# Patient Record
Sex: FEMALE | URBAN - NONMETROPOLITAN AREA
[De-identification: names, ages, dates, MRNs, and addresses within clinical notes are randomized per-mention and may not be internally consistent; named-entity substitution may affect disease eponyms.]

---

## 2019-07-15 ENCOUNTER — OFFICE VISIT (OUTPATIENT)
Dept: PRIMARY CARE CLINIC | Age: 44
End: 2019-07-15
Payer: COMMERCIAL

## 2019-07-15 VITALS
OXYGEN SATURATION: 97 % | HEART RATE: 88 BPM | SYSTOLIC BLOOD PRESSURE: 126 MMHG | RESPIRATION RATE: 12 BRPM | HEIGHT: 68 IN | DIASTOLIC BLOOD PRESSURE: 86 MMHG

## 2019-07-15 DIAGNOSIS — F41.9 ANXIETY: Primary | ICD-10-CM

## 2019-07-15 DIAGNOSIS — R10.9 ABDOMINAL PAIN, UNSPECIFIED ABDOMINAL LOCATION: ICD-10-CM

## 2019-07-15 DIAGNOSIS — R21 RASH: ICD-10-CM

## 2019-07-15 DIAGNOSIS — K21.9 GASTROESOPHAGEAL REFLUX DISEASE, ESOPHAGITIS PRESENCE NOT SPECIFIED: ICD-10-CM

## 2019-07-15 DIAGNOSIS — G47.00 INSOMNIA, UNSPECIFIED TYPE: ICD-10-CM

## 2019-07-15 PROCEDURE — 99214 OFFICE O/P EST MOD 30 MIN: CPT | Performed by: FAMILY MEDICINE

## 2019-07-15 RX ORDER — ZOLPIDEM TARTRATE 10 MG/1
10 TABLET ORAL DAILY
Qty: 30 TABLET | Refills: 0 | Status: SHIPPED | OUTPATIENT
Start: 2019-07-15 | End: 2019-08-14

## 2019-07-15 RX ORDER — FUROSEMIDE 20 MG/1
20 TABLET ORAL DAILY
COMMUNITY
End: 2019-07-15 | Stop reason: SDUPTHER

## 2019-07-15 RX ORDER — QUETIAPINE FUMARATE 200 MG/1
100 TABLET, FILM COATED ORAL DAILY
COMMUNITY
End: 2019-07-15 | Stop reason: SDUPTHER

## 2019-07-15 RX ORDER — TOPIRAMATE 25 MG/1
25 TABLET ORAL DAILY
COMMUNITY
End: 2019-07-15 | Stop reason: SDUPTHER

## 2019-07-15 RX ORDER — SUCRALFATE 1 G/1
1 TABLET ORAL 3 TIMES DAILY
Refills: 5 | COMMUNITY
Start: 2019-06-28 | End: 2019-07-15 | Stop reason: SDUPTHER

## 2019-07-15 RX ORDER — TOPIRAMATE 25 MG/1
25 TABLET ORAL DAILY
Qty: 30 TABLET | Refills: 3 | Status: SHIPPED
Start: 2019-07-15 | End: 2020-09-02 | Stop reason: SDUPTHER

## 2019-07-15 RX ORDER — ALPRAZOLAM 2 MG/1
2 TABLET ORAL 3 TIMES DAILY
COMMUNITY
End: 2019-07-15 | Stop reason: SDUPTHER

## 2019-07-15 RX ORDER — SUCRALFATE 1 G/1
1 TABLET ORAL 3 TIMES DAILY
Qty: 120 TABLET | Refills: 5 | Status: SHIPPED
Start: 2019-07-15 | End: 2020-09-02 | Stop reason: SDUPTHER

## 2019-07-15 RX ORDER — KETOTIFEN FUMARATE 0.35 MG/ML
1 SOLUTION/ DROPS OPHTHALMIC 2 TIMES DAILY
COMMUNITY
End: 2019-07-15 | Stop reason: SDUPTHER

## 2019-07-15 RX ORDER — ALPRAZOLAM 2 MG/1
2 TABLET ORAL 3 TIMES DAILY
Qty: 90 TABLET | Refills: 0 | Status: SHIPPED | OUTPATIENT
Start: 2019-07-15 | End: 2019-08-14

## 2019-07-15 RX ORDER — DEXLANSOPRAZOLE 60 MG/1
60 CAPSULE, DELAYED RELEASE ORAL DAILY
Qty: 30 CAPSULE | Refills: 5 | Status: SHIPPED
Start: 2019-07-15 | End: 2020-09-02 | Stop reason: SDUPTHER

## 2019-07-15 RX ORDER — DEXLANSOPRAZOLE 60 MG/1
60 CAPSULE, DELAYED RELEASE ORAL DAILY
Refills: 5 | COMMUNITY
Start: 2019-04-16 | End: 2019-07-15 | Stop reason: SDUPTHER

## 2019-07-15 RX ORDER — KETOTIFEN FUMARATE 0.35 MG/ML
1 SOLUTION/ DROPS OPHTHALMIC 2 TIMES DAILY
Qty: 1 BOTTLE | Refills: 3 | Status: SHIPPED
Start: 2019-07-15 | End: 2020-09-02 | Stop reason: SDUPTHER

## 2019-07-15 RX ORDER — ZOLPIDEM TARTRATE 10 MG/1
10 TABLET ORAL DAILY
COMMUNITY
Start: 2009-04-17 | End: 2019-07-15 | Stop reason: SDUPTHER

## 2019-07-15 RX ORDER — QUETIAPINE FUMARATE 200 MG/1
200 TABLET, FILM COATED ORAL DAILY
Qty: 30 TABLET | Refills: 3 | Status: SHIPPED
Start: 2019-07-15 | End: 2020-09-02 | Stop reason: SDUPTHER

## 2019-07-15 RX ORDER — TRIAMCINOLONE ACETONIDE 1 MG/G
CREAM TOPICAL
Qty: 1 TUBE | Refills: 2 | Status: SHIPPED | OUTPATIENT
Start: 2019-07-15

## 2019-07-15 RX ORDER — FUROSEMIDE 20 MG/1
20 TABLET ORAL DAILY
Qty: 30 TABLET | Refills: 3 | Status: SHIPPED
Start: 2019-07-15 | End: 2020-09-02 | Stop reason: SDUPTHER

## 2019-07-15 ASSESSMENT — PATIENT HEALTH QUESTIONNAIRE - PHQ9
1. LITTLE INTEREST OR PLEASURE IN DOING THINGS: 0
SUM OF ALL RESPONSES TO PHQ QUESTIONS 1-9: 2
SUM OF ALL RESPONSES TO PHQ QUESTIONS 1-9: 2
2. FEELING DOWN, DEPRESSED OR HOPELESS: 2
SUM OF ALL RESPONSES TO PHQ9 QUESTIONS 1 & 2: 2

## 2019-07-15 NOTE — PROGRESS NOTES
tablet by mouth 3 times daily, Disp: 120 tablet, Rfl: 5    QUEtiapine (SEROQUEL) 200 MG tablet, Take 1 tablet by mouth daily, Disp: 30 tablet, Rfl: 3    ketotifen (ALAWAY) 0.025 % ophthalmic solution, Place 1 drop into both eyes 2 times daily, Disp: 1 Bottle, Rfl: 3    furosemide (LASIX) 20 MG tablet, Take 1 tablet by mouth daily, Disp: 30 tablet, Rfl: 3    DEXILANT 60 MG CPDR delayed release capsule, Take 1 capsule by mouth daily, Disp: 30 capsule, Rfl: 5    ALPRAZolam (XANAX) 2 MG tablet, Take 1 tablet by mouth 3 times daily for 30 days. , Disp: 90 tablet, Rfl: 0    zolpidem (AMBIEN) 10 MG tablet, Take 1 tablet by mouth daily for 30 days. , Disp: 30 tablet, Rfl: 0    triamcinolone (KENALOG) 0.1 % cream, Apply topically 2 times daily. , Disp: 1 Tube, Rfl: 2    Allergies   Allergen Reactions    Latex Itching and Rash    Morphine Hives    Penicillins Hives       No past medical history on file.   Social History     Socioeconomic History    Marital status: Single     Spouse name: Not on file    Number of children: Not on file    Years of education: Not on file    Highest education level: Not on file   Occupational History    Not on file   Social Needs    Financial resource strain: Not on file    Food insecurity:     Worry: Not on file     Inability: Not on file    Transportation needs:     Medical: Not on file     Non-medical: Not on file   Tobacco Use    Smoking status: Never Smoker    Smokeless tobacco: Never Used   Substance and Sexual Activity    Alcohol use: Not Currently    Drug use: Never    Sexual activity: Yes   Lifestyle    Physical activity:     Days per week: Not on file     Minutes per session: Not on file    Stress: Not on file   Relationships    Social connections:     Talks on phone: Not on file     Gets together: Not on file     Attends Anabaptist service: Not on file     Active member of club or organization: Not on file     Attends meetings of clubs or organizations: Not on file

## 2019-07-24 NOTE — TELEPHONE ENCOUNTER
Patient is calling in to update you on her legs. Her legs are still holding water and she is still breaking out and her legs are still sore, it is a muscle pain. She has been using the cream that she was given and it is still there. Patient states that her lower back is hurting her. She is not sure if it is just because she is sitting down all the time or if it is her kidney. She just wanted to make you aware of that. She would also like her inhaler refilled.

## 2019-08-01 RX ORDER — ALBUTEROL SULFATE 90 UG/1
2 AEROSOL, METERED RESPIRATORY (INHALATION) EVERY 6 HOURS PRN
COMMUNITY
End: 2019-08-01 | Stop reason: SDUPTHER

## 2019-08-01 RX ORDER — ALBUTEROL SULFATE 90 UG/1
2 AEROSOL, METERED RESPIRATORY (INHALATION) EVERY 6 HOURS PRN
Qty: 1 INHALER | Refills: 2 | Status: SHIPPED
Start: 2019-08-01 | End: 2020-09-02 | Stop reason: SDUPTHER

## 2019-08-16 RX ORDER — ONDANSETRON 4 MG/1
24 TABLET, FILM COATED ORAL EVERY 8 HOURS PRN
COMMUNITY
End: 2019-08-16 | Stop reason: SDUPTHER

## 2019-08-16 RX ORDER — ONDANSETRON 4 MG/1
8 TABLET, FILM COATED ORAL EVERY 8 HOURS PRN
Qty: 90 TABLET | Refills: 0 | Status: SHIPPED | OUTPATIENT
Start: 2019-08-16 | End: 2019-09-15

## 2019-09-26 ENCOUNTER — TELEPHONE (OUTPATIENT)
Dept: PRIMARY CARE CLINIC | Age: 44
End: 2019-09-26

## 2019-10-08 RX ORDER — ZOLPIDEM TARTRATE 10 MG/1
TABLET ORAL NIGHTLY PRN
COMMUNITY

## 2019-10-08 RX ORDER — GABAPENTIN 300 MG/1
300 CAPSULE ORAL 3 TIMES DAILY
COMMUNITY

## 2019-10-08 RX ORDER — FLUTICASONE PROPIONATE 50 MCG
2 SPRAY, SUSPENSION (ML) NASAL DAILY
COMMUNITY
End: 2021-03-02 | Stop reason: SDUPTHER

## 2019-10-08 RX ORDER — PANTOPRAZOLE SODIUM 40 MG/1
40 TABLET, DELAYED RELEASE ORAL DAILY
COMMUNITY
End: 2021-03-02 | Stop reason: SDUPTHER

## 2019-10-08 RX ORDER — ALPRAZOLAM 2 MG/1
2 TABLET ORAL 3 TIMES DAILY
COMMUNITY

## 2019-10-08 RX ORDER — PERMETHRIN 50 MG/G
CREAM TOPICAL ONCE
COMMUNITY
End: 2021-03-02

## 2019-10-08 RX ORDER — FLUCONAZOLE 150 MG/1
150 TABLET ORAL ONCE
COMMUNITY
End: 2021-03-03 | Stop reason: SDUPTHER

## 2020-05-19 ENCOUNTER — TELEPHONE (OUTPATIENT)
Dept: PRIMARY CARE CLINIC | Age: 45
End: 2020-05-19

## 2020-05-19 RX ORDER — LORATADINE 10 MG/1
10 TABLET ORAL DAILY
Qty: 30 TABLET | Refills: 0 | Status: SHIPPED | OUTPATIENT
Start: 2020-05-19 | End: 2020-06-18

## 2020-08-13 ENCOUNTER — TELEPHONE (OUTPATIENT)
Dept: PRIMARY CARE CLINIC | Age: 45
End: 2020-08-13

## 2020-08-13 NOTE — TELEPHONE ENCOUNTER
Last Appointment:  10/16/2019  No future appointments. Patient left message about paperwork for her wheelchair. She needs parts and [employer] has faxed over paperwork to be completed. [facility] told her they have not received paperwork back. Did not find paperwork scanned on chart.     Electronically signed by Manda Sahni LPN on 5/05/5052 at 7:44 PM

## 2020-08-25 ENCOUNTER — HOSPITAL ENCOUNTER (OUTPATIENT)
Age: 45
Discharge: HOME | End: 2020-08-27
Payer: COMMERCIAL

## 2020-08-25 ENCOUNTER — OFFICE VISIT (OUTPATIENT)
Dept: FAMILY MEDICINE CLINIC | Age: 45
End: 2020-08-25
Payer: COMMERCIAL

## 2020-08-25 VITALS
HEART RATE: 92 BPM | OXYGEN SATURATION: 97 % | SYSTOLIC BLOOD PRESSURE: 115 MMHG | TEMPERATURE: 97.8 F | DIASTOLIC BLOOD PRESSURE: 75 MMHG

## 2020-08-25 PROCEDURE — 1036F TOBACCO NON-USER: CPT | Performed by: PHYSICIAN ASSISTANT

## 2020-08-25 PROCEDURE — 99213 OFFICE O/P EST LOW 20 MIN: CPT | Performed by: PHYSICIAN ASSISTANT

## 2020-08-25 PROCEDURE — G8421 BMI NOT CALCULATED: HCPCS | Performed by: PHYSICIAN ASSISTANT

## 2020-08-25 PROCEDURE — 87186 SC STD MICRODIL/AGAR DIL: CPT

## 2020-08-25 PROCEDURE — G8427 DOCREV CUR MEDS BY ELIG CLIN: HCPCS | Performed by: PHYSICIAN ASSISTANT

## 2020-08-25 PROCEDURE — 87070 CULTURE OTHR SPECIMN AEROBIC: CPT

## 2020-08-25 RX ORDER — CEFDINIR 300 MG/1
300 CAPSULE ORAL 2 TIMES DAILY
Qty: 14 CAPSULE | Refills: 0 | Status: SHIPPED | OUTPATIENT
Start: 2020-08-25 | End: 2020-09-01

## 2020-08-25 RX ORDER — DOXYCYCLINE HYCLATE 100 MG
100 TABLET ORAL 2 TIMES DAILY
Qty: 20 TABLET | Refills: 0 | Status: SHIPPED | OUTPATIENT
Start: 2020-08-25 | End: 2020-09-04

## 2020-08-25 ASSESSMENT — ENCOUNTER SYMPTOMS
SORE THROAT: 0
VOMITING: 0
NAUSEA: 0
SHORTNESS OF BREATH: 0
COUGH: 0
PHOTOPHOBIA: 0
BACK PAIN: 0
DIARRHEA: 0
ABDOMINAL PAIN: 0

## 2020-08-25 NOTE — PROGRESS NOTES
20  [facility] : 1975 Sex: female  Age 40 y.o. Subjective:  Chief Complaint   Patient presents with    Leg Swelling         42-year-old female presents to the walk-in clinic for evaluation of a rash on her lower right leg. Patient states the rash has been there for several months. She was given a cream by her PCP to try but states it made it worse. She states at first it was itching but now it is painful. She states that she has blisters form which then drain serous fluid. She denies any new soaps, lotions, detergents or medications. Patient denies any fever or chills. No nausea or vomiting. She denies numbness or tingling to her extremities. Review of Systems   Constitutional: Negative for chills and fever. HENT: Negative for congestion, ear pain and sore throat. Eyes: Negative for photophobia and visual disturbance. Respiratory: Negative for cough and shortness of breath. Cardiovascular: Negative for chest pain. Gastrointestinal: Negative for abdominal pain, diarrhea, nausea and vomiting. Genitourinary: Negative for difficulty urinating, dysuria, frequency and urgency. Musculoskeletal: Negative for back pain, neck pain and neck stiffness. Skin: Positive for rash. Neurological: Negative for dizziness, syncope, weakness, light-headedness and headaches. Hematological: Negative for adenopathy. Does not bruise/bleed easily. Psychiatric/Behavioral: Negative for agitation and confusion. All other systems reviewed and are negative. PMH:   History reviewed. No pertinent past medical history. History reviewed. No pertinent surgical history. History reviewed. No pertinent family history. Medications:     Current Outpatient Medications:     fluconazole (DIFLUCAN) 150 MG tablet, Take 150 mg by mouth once 1 by mouth now, and may repeat in 24 hours, Disp: , Rfl:     gabapentin (NEURONTIN) 300 MG capsule, Take 300 mg by mouth 3 times daily. , Disp: , Rfl:     pantoprazole (PROTONIX) 40 MG tablet, Take 40 mg by mouth daily, Disp: , Rfl:     ALPRAZolam (XANAX) 2 MG tablet, Take 2 mg by mouth 3 times daily. 1 by mouth three times a day, Disp: , Rfl:     zolpidem (AMBIEN) 10 MG tablet, Take by mouth nightly as needed for Sleep. 1 by mouth every night at bedtime as needed; sleep, Disp: , Rfl:     permethrin (ELIMITE) 5 % cream, Apply topically once Apply topically as directed, Disp: , Rfl:     fluticasone (FLONASE) 50 MCG/ACT nasal spray, 2 sprays by Each Nostril route daily 2 sprays each nostril daily, Disp: , Rfl:     albuterol sulfate  (90 Base) MCG/ACT inhaler, Inhale 2 puffs into the lungs every 6 hours as needed for Wheezing, Disp: 1 Inhaler, Rfl: 2    topiramate (TOPAMAX) 25 MG tablet, Take 1 tablet by mouth daily, Disp: 30 tablet, Rfl: 3    sucralfate (CARAFATE) 1 GM tablet, Take 1 tablet by mouth 3 times daily, Disp: 120 tablet, Rfl: 5    ketotifen (ALAWAY) 0.025 % ophthalmic solution, Place 1 drop into both eyes 2 times daily, Disp: 1 Bottle, Rfl: 3    furosemide (LASIX) 20 MG tablet, Take 1 tablet by mouth daily, Disp: 30 tablet, Rfl: 3    DEXILANT 60 MG CPDR delayed release capsule, Take 1 capsule by mouth daily, Disp: 30 capsule, Rfl: 5    triamcinolone (KENALOG) 0.1 % cream, Apply topically 2 times daily. , Disp: 1 Tube, Rfl: 2    QUEtiapine (SEROQUEL) 200 MG tablet, Take 1 tablet by mouth daily, Disp: 30 tablet, Rfl: 3    Allergies: Allergies   Allergen Reactions    Latex Itching and Rash    Morphine Hives    Penicillins Hives       Social History:     Social History     Tobacco Use    Smoking status: Never Smoker    Smokeless tobacco: Never Used   Substance Use Topics    Alcohol use: Not Currently    Drug use: Never       Patient lives at home.     Physical Exam:     Vitals:    08/25/20 1250   BP: 115/75   Pulse: 92   Temp: 97.8 °F (36.6 °C)   TempSrc: Temporal   SpO2: 97%       Exam:  Physical Exam  Vitals signs and nursing note reviewed. Constitutional:       General: She is not in acute distress. Appearance: She is well-developed. HENT:      Head: Normocephalic and atraumatic. Eyes:      Conjunctiva/sclera: Conjunctivae normal.      Pupils: Pupils are equal, round, and reactive to light. Neck:      Musculoskeletal: Normal range of motion. Cardiovascular:      Rate and Rhythm: Normal rate and regular rhythm. Pulmonary:      Effort: Pulmonary effort is normal. No respiratory distress. Breath sounds: Normal breath sounds. Abdominal:      General: Bowel sounds are normal.      Palpations: Abdomen is soft. Tenderness: There is no abdominal tenderness. Musculoskeletal: Normal range of motion. Skin:     General: Skin is warm and dry. Comments: Patient has evidence of erythematous maculopapular rash noted to the distal anterior medial right leg. There are some blister formation which is oozing serous fluid. I collected a wound culture. The surrounding area is erythematous and warm to the touch. There is no fluctuance or induration. No vesicle formation. There is no sloughing the skin. No petechia or purpura. No calf tenderness. Neurological:      Mental Status: She is alert and oriented to person, place, and time. Psychiatric:         Behavior: Behavior normal.         Thought Content: Thought content normal.         Judgment: Judgment normal.           Testing:           Medical Decision Making:     Patient upon arrival did not appear toxic or lethargic. Vital signs were reviewed. Past medical history reviewed. Allergies reviewed. Medications reviewed. Patient is presenting with the above complaint of rash. Differential diagnosis was discussed with the patient. Wound culture is pending. There is concern for a secondary infection and the patient will be placed on Omnicef and doxycycline. Patient has an allergy to penicillin causing a rash.   Her reaction is not anaphylactic. Patient has an appointment to follow-up with her PCP on Monday of next week. She will return or go to the emergency department for worsening symptoms. Clinical Impression:   Richi Redman was seen today for leg swelling. Diagnoses and all orders for this visit:    Rash in adult  -     Culture, Wound; Future    Other orders  -     doxycycline hyclate (VIBRA-TABS) 100 MG tablet; Take 1 tablet by mouth 2 times daily for 10 days  -     cefdinir (OMNICEF) 300 MG capsule; Take 1 capsule by mouth 2 times daily for 7 days        The patient is to call for any concerns or return if any of the signs or symptoms worsen. The patient is to follow-up with PCP in the next 2-3 days for repeat evaluation repeat assessment or go directly to the emergency department. SIGNATURE: Traci Romeo PA-C

## 2020-08-28 LAB
ORGANISM: ABNORMAL
ORGANISM: ABNORMAL
WOUND/ABSCESS: ABNORMAL
WOUND/ABSCESS: ABNORMAL

## 2020-09-02 ENCOUNTER — OFFICE VISIT (OUTPATIENT)
Dept: PRIMARY CARE CLINIC | Age: 45
End: 2020-09-02
Payer: COMMERCIAL

## 2020-09-02 ENCOUNTER — TELEPHONE (OUTPATIENT)
Dept: PRIMARY CARE CLINIC | Age: 45
End: 2020-09-02

## 2020-09-02 ENCOUNTER — HOSPITAL ENCOUNTER (OUTPATIENT)
Age: 45
Discharge: HOME | End: 2020-09-04
Payer: COMMERCIAL

## 2020-09-02 VITALS — DIASTOLIC BLOOD PRESSURE: 76 MMHG | SYSTOLIC BLOOD PRESSURE: 132 MMHG | RESPIRATION RATE: 16 BRPM | HEIGHT: 68 IN

## 2020-09-02 LAB
ANION GAP SERPL CALCULATED.3IONS-SCNC: 16 MMOL/L (ref 7–16)
BUN BLDV-MCNC: 6 MG/DL (ref 6–20)
CALCIUM SERPL-MCNC: 9.4 MG/DL (ref 8.6–10.2)
CHLORIDE BLD-SCNC: 104 MMOL/L (ref 98–107)
CHOLESTEROL, TOTAL: 151 MG/DL (ref 0–199)
CO2: 22 MMOL/L (ref 22–29)
CREAT SERPL-MCNC: 0.6 MG/DL (ref 0.5–1)
GFR AFRICAN AMERICAN: >60
GFR NON-AFRICAN AMERICAN: >60 ML/MIN/1.73
GLUCOSE BLD-MCNC: 95 MG/DL (ref 74–99)
HDLC SERPL-MCNC: 43 MG/DL
LDL CHOLESTEROL CALCULATED: 78 MG/DL (ref 0–99)
POTASSIUM SERPL-SCNC: 4 MMOL/L (ref 3.5–5)
SODIUM BLD-SCNC: 142 MMOL/L (ref 132–146)
TRIGL SERPL-MCNC: 150 MG/DL (ref 0–149)
VLDLC SERPL CALC-MCNC: 30 MG/DL

## 2020-09-02 PROCEDURE — 86703 HIV-1/HIV-2 1 RESULT ANTBDY: CPT

## 2020-09-02 PROCEDURE — 99213 OFFICE O/P EST LOW 20 MIN: CPT | Performed by: FAMILY MEDICINE

## 2020-09-02 PROCEDURE — 80048 BASIC METABOLIC PNL TOTAL CA: CPT

## 2020-09-02 PROCEDURE — 99396 PREV VISIT EST AGE 40-64: CPT | Performed by: FAMILY MEDICINE

## 2020-09-02 PROCEDURE — 80061 LIPID PANEL: CPT

## 2020-09-02 PROCEDURE — 36415 COLL VENOUS BLD VENIPUNCTURE: CPT

## 2020-09-02 RX ORDER — ALBUTEROL SULFATE 90 UG/1
2 AEROSOL, METERED RESPIRATORY (INHALATION) EVERY 6 HOURS PRN
Qty: 1 INHALER | Refills: 2 | Status: SHIPPED
Start: 2020-09-02 | End: 2021-03-03 | Stop reason: SDUPTHER

## 2020-09-02 RX ORDER — METHYLPREDNISOLONE 4 MG/1
TABLET ORAL
Qty: 21 TABLET | Refills: 0 | Status: SHIPPED | OUTPATIENT
Start: 2020-09-02 | End: 2021-08-31

## 2020-09-02 RX ORDER — TOPIRAMATE 25 MG/1
25 TABLET ORAL DAILY
Qty: 30 TABLET | Refills: 3 | Status: SHIPPED
Start: 2020-09-02 | End: 2021-03-03 | Stop reason: SDUPTHER

## 2020-09-02 RX ORDER — SUCRALFATE 1 G/1
1 TABLET ORAL 3 TIMES DAILY
Qty: 120 TABLET | Refills: 5 | Status: SHIPPED
Start: 2020-09-02 | End: 2021-03-03 | Stop reason: SDUPTHER

## 2020-09-02 RX ORDER — QUETIAPINE FUMARATE 200 MG/1
200 TABLET, FILM COATED ORAL DAILY
Qty: 30 TABLET | Refills: 3 | Status: SHIPPED
Start: 2020-09-02 | End: 2021-03-03 | Stop reason: SDUPTHER

## 2020-09-02 RX ORDER — KETOTIFEN FUMARATE 0.35 MG/ML
1 SOLUTION/ DROPS OPHTHALMIC 2 TIMES DAILY
Qty: 1 BOTTLE | Refills: 3 | Status: SHIPPED
Start: 2020-09-02 | End: 2021-03-03 | Stop reason: SDUPTHER

## 2020-09-02 RX ORDER — FUROSEMIDE 20 MG/1
20 TABLET ORAL DAILY
Qty: 30 TABLET | Refills: 3 | Status: SHIPPED
Start: 2020-09-02 | End: 2021-03-03 | Stop reason: SDUPTHER

## 2020-09-02 RX ORDER — DEXLANSOPRAZOLE 60 MG/1
60 CAPSULE, DELAYED RELEASE ORAL DAILY
Qty: 30 CAPSULE | Refills: 5 | Status: SHIPPED
Start: 2020-09-02 | End: 2021-01-22 | Stop reason: SDUPTHER

## 2020-09-02 ASSESSMENT — PATIENT HEALTH QUESTIONNAIRE - PHQ9
SUM OF ALL RESPONSES TO PHQ9 QUESTIONS 1 & 2: 0
SUM OF ALL RESPONSES TO PHQ QUESTIONS 1-9: 0
SUM OF ALL RESPONSES TO PHQ QUESTIONS 1-9: 0
1. LITTLE INTEREST OR PLEASURE IN DOING THINGS: 0
2. FEELING DOWN, DEPRESSED OR HOPELESS: 0

## 2020-09-02 NOTE — PROGRESS NOTES
MCG/ACT inhaler, Inhale 2 puffs into the lungs every 6 hours as needed for Wheezing, Disp: 1 Inhaler, Rfl: 2    DEXILANT 60 MG CPDR delayed release capsule, Take 1 capsule by mouth daily, Disp: 30 capsule, Rfl: 5    furosemide (LASIX) 20 MG tablet, Take 1 tablet by mouth daily, Disp: 30 tablet, Rfl: 3    ketotifen (ALAWAY) 0.025 % ophthalmic solution, Place 1 drop into both eyes 2 times daily, Disp: 1 Bottle, Rfl: 3    QUEtiapine (SEROQUEL) 200 MG tablet, Take 1 tablet by mouth daily, Disp: 30 tablet, Rfl: 3    sucralfate (CARAFATE) 1 GM tablet, Take 1 tablet by mouth 3 times daily, Disp: 120 tablet, Rfl: 5    topiramate (TOPAMAX) 25 MG tablet, Take 1 tablet by mouth daily, Disp: 30 tablet, Rfl: 3    methylPREDNISolone (MEDROL DOSEPACK) 4 MG tablet, Take by mouth., Disp: 21 tablet, Rfl: 0    doxycycline hyclate (VIBRA-TABS) 100 MG tablet, Take 1 tablet by mouth 2 times daily for 10 days, Disp: 20 tablet, Rfl: 0    fluconazole (DIFLUCAN) 150 MG tablet, Take 150 mg by mouth once 1 by mouth now, and may repeat in 24 hours, Disp: , Rfl:     gabapentin (NEURONTIN) 300 MG capsule, Take 300 mg by mouth 3 times daily. , Disp: , Rfl:     pantoprazole (PROTONIX) 40 MG tablet, Take 40 mg by mouth daily, Disp: , Rfl:     ALPRAZolam (XANAX) 2 MG tablet, Take 2 mg by mouth 3 times daily. 1 by mouth three times a day, Disp: , Rfl:     zolpidem (AMBIEN) 10 MG tablet, Take by mouth nightly as needed for Sleep. 1 by mouth every night at bedtime as needed; sleep, Disp: , Rfl:     permethrin (ELIMITE) 5 % cream, Apply topically once Apply topically as directed, Disp: , Rfl:     fluticasone (FLONASE) 50 MCG/ACT nasal spray, 2 sprays by Each Nostril route daily 2 sprays each nostril daily, Disp: , Rfl:     triamcinolone (KENALOG) 0.1 % cream, Apply topically 2 times daily. , Disp: 1 Tube, Rfl: 2    Allergies   Allergen Reactions    Latex Itching and Rash    Morphine Hives    Penicillins Hives       History reviewed.  No pertinent past medical history. Social History     Socioeconomic History    Marital status: Single     Spouse name: Not on file    Number of children: Not on file    Years of education: Not on file    Highest education level: Not on file   Occupational History    Not on file   Social Needs    Financial resource strain: Not on file    Food insecurity     Worry: Not on file     Inability: Not on file    Transportation needs     Medical: Not on file     Non-medical: Not on file   Tobacco Use    Smoking status: Never Smoker    Smokeless tobacco: Never Used   Substance and Sexual Activity    Alcohol use: Not Currently    Drug use: Never    Sexual activity: Yes   Lifestyle    Physical activity     Days per week: Not on file     Minutes per session: Not on file    Stress: Not on file   Relationships    Social connections     Talks on phone: Not on file     Gets together: Not on file     Attends Sikhism service: Not on file     Active member of club or organization: Not on file     Attends meetings of clubs or organizations: Not on file     Relationship status: Not on file    Intimate partner violence     Fear of current or ex partner: Not on file     Emotionally abused: Not on file     Physically abused: Not on file     Forced sexual activity: Not on file   Other Topics Concern    Not on file   Social History Narrative    Not on file     History reviewed. No pertinent surgical history. History reviewed. No pertinent family history. Vitals:    09/02/20 1217   BP: 132/76   Resp: 16   Weight: Comment: unable   Height: 5' 8\" (1.727 m)        Exam: Const: Appears healthy and well developed. No signs of acute distress present. Eyes: PERRL  ENMT: Tympanic membranes are intact. Nasal mucosa intact without noted erythema Septum is in the midline. Posterior pharynx shows no exudate, irritation or redness. Neck:  Supple without adenopathy.   Adequate range of motion   Resp: No rales, rhonchi, wheezes recognition software. Document was reviewed however may contain grammatical errors.

## 2020-09-02 NOTE — TELEPHONE ENCOUNTER
Last Appointment:  9/2/2020  Future Appointments   Date Time Provider Sia Schulz   12/2/2020  1:00 PM Ar Calvin  [facility]

## 2020-09-03 LAB — HIV-1 AND HIV-2 ANTIBODIES: NORMAL

## 2020-09-25 ENCOUNTER — TELEPHONE (OUTPATIENT)
Dept: ADMINISTRATIVE | Age: 45
End: 2020-09-25

## 2020-09-28 ENCOUNTER — OFFICE VISIT (OUTPATIENT)
Dept: PRIMARY CARE CLINIC | Age: 45
End: 2020-09-28
Payer: COMMERCIAL

## 2020-09-28 ENCOUNTER — TELEPHONE (OUTPATIENT)
Dept: PRIMARY CARE CLINIC | Age: 45
End: 2020-09-28

## 2020-09-28 VITALS
SYSTOLIC BLOOD PRESSURE: 130 MMHG | RESPIRATION RATE: 16 BRPM | HEIGHT: 68 IN | TEMPERATURE: 97.8 F | HEART RATE: 84 BPM | OXYGEN SATURATION: 96 % | DIASTOLIC BLOOD PRESSURE: 88 MMHG

## 2020-09-28 PROCEDURE — 1036F TOBACCO NON-USER: CPT | Performed by: FAMILY MEDICINE

## 2020-09-28 PROCEDURE — G8427 DOCREV CUR MEDS BY ELIG CLIN: HCPCS | Performed by: FAMILY MEDICINE

## 2020-09-28 PROCEDURE — 99213 OFFICE O/P EST LOW 20 MIN: CPT | Performed by: FAMILY MEDICINE

## 2020-09-28 PROCEDURE — G8421 BMI NOT CALCULATED: HCPCS | Performed by: FAMILY MEDICINE

## 2020-09-28 NOTE — TELEPHONE ENCOUNTER
Pt wanted to know if you were going to do anything else for her leg? If you were going to call in anything else. Pt states just give her a call.

## 2020-09-28 NOTE — PROGRESS NOTES
education level: Not on file   Occupational History    Not on file   Social Needs    Financial resource strain: Not on file    Food insecurity     Worry: Not on file     Inability: Not on file    Transportation needs     Medical: Not on file     Non-medical: Not on file   Tobacco Use    Smoking status: Never Smoker    Smokeless tobacco: Never Used   Substance and Sexual Activity    Alcohol use: Not Currently    Drug use: Never    Sexual activity: Yes   Lifestyle    Physical activity     Days per week: Not on file     Minutes per session: Not on file    Stress: Not on file   Relationships    Social connections     Talks on phone: Not on file     Gets together: Not on file     Attends Sikh service: Not on file     Active member of club or organization: Not on file     Attends meetings of clubs or organizations: Not on file     Relationship status: Not on file    Intimate partner violence     Fear of current or ex partner: Not on file     Emotionally abused: Not on file     Physically abused: Not on file     Forced sexual activity: Not on file   Other Topics Concern    Not on file   Social History Narrative    Not on file     History reviewed. No pertinent surgical history. History reviewed. No pertinent family history. Vitals:    09/28/20 1056 09/28/20 1105   BP: (!) 140/102 130/88   Site: Left Upper Arm Left Lower Arm   Pulse: 84    Resp: 16    Temp: 97.8 °F (36.6 °C)    TempSrc: Temporal    SpO2: 96%    Height: 5' 8\" (1.727 m)         Exam: Const: Appears healthy and well developed. No signs of acute distress present. Eyes: PERRL  ENMT: Tympanic membranes are intact. Nasal mucosa intact without noted erythema Septum is in the midline. Posterior pharynx shows no exudate, irritation or redness. Neck:  Supple without adenopathy. Adequate range of motion   Resp: No rales, rhonchi, wheezes appreciated over the lungs bilaterally. CV: S1, S2 within normal limits.   Regular rate and rhythm

## 2020-12-02 ENCOUNTER — VIRTUAL VISIT (OUTPATIENT)
Dept: PRIMARY CARE CLINIC | Age: 45
End: 2020-12-02
Payer: COMMERCIAL

## 2020-12-02 ENCOUNTER — NURSE ONLY (OUTPATIENT)
Dept: PRIMARY CARE CLINIC | Age: 45
End: 2020-12-02

## 2020-12-02 DIAGNOSIS — Z20.822 SUSPECTED COVID-19 VIRUS INFECTION: ICD-10-CM

## 2020-12-02 PROCEDURE — 99442 PR PHYS/QHP TELEPHONE EVALUATION 11-20 MIN: CPT | Performed by: FAMILY MEDICINE

## 2020-12-02 NOTE — PROGRESS NOTES
Oni Sneed is a 40 y.o. female evaluated via telephone on 12/2/2020. Consent:  She and/or health care decision maker is aware that that she may receive a bill for this telephone service, depending on her insurance coverage, and has provided verbal consent to proceed: Yes      Documentation:  I communicated with the patient and/or health care decision maker about flu type symptoms. Details of this discussion including any medical advice provided: This 42-year-old female presents today with a 4-day history of flu type symptoms including muscle aches and pains, malaise/fatigue, headache and shortness of breath. The patient denies any loss of taste or smell. The patient denies any fever. Current medication list reviewed. The patient is tolerating all medications well without adverse events or known side effects. The patient is not up-to-date on all age-appropriate wellness issues. From #1 viral syndrome rule out Covid plan #1 the patient was sent to the local emergency room #2 schedule office visit follow-up in 3 months. I affirm this is a Patient Initiated Episode with a Patient who has not had a related appointment within my department in the past 7 days or scheduled within the next 24 hours.     Patient identification was verified at the start of the visit: Yes    Total Time: minutes: 5-10 minutes    Note: not billable if this call serves to triage the patient into an appointment for the relevant concern      Brandee Romero

## 2020-12-04 LAB
SARS-COV-2: NOT DETECTED
SOURCE: NORMAL

## 2020-12-07 ENCOUNTER — TELEPHONE (OUTPATIENT)
Dept: PRIMARY CARE CLINIC | Age: 45
End: 2020-12-07

## 2020-12-07 NOTE — TELEPHONE ENCOUNTER
Patient left  requesting a call back from one of Dr. Taj Zhao' nurses. She did not say in the VM what it was regarding.

## 2021-01-22 RX ORDER — DEXLANSOPRAZOLE 60 MG/1
60 CAPSULE, DELAYED RELEASE ORAL DAILY
Qty: 30 CAPSULE | Refills: 5 | Status: SHIPPED
Start: 2021-01-22 | End: 2021-03-03 | Stop reason: SDUPTHER

## 2021-01-22 NOTE — TELEPHONE ENCOUNTER
Last Appointment:  12/2/2020  Future Appointments   Date Time Provider Sia Schulz   3/2/2021  2:30 PM Karthik Art MD [facility]      Refill pending

## 2021-03-02 ENCOUNTER — OFFICE VISIT (OUTPATIENT)
Dept: PRIMARY CARE CLINIC | Age: 46
End: 2021-03-02
Payer: COMMERCIAL

## 2021-03-02 VITALS — SYSTOLIC BLOOD PRESSURE: 118 MMHG | DIASTOLIC BLOOD PRESSURE: 70 MMHG

## 2021-03-02 DIAGNOSIS — R06.02 SHORTNESS OF BREATH: ICD-10-CM

## 2021-03-02 DIAGNOSIS — K76.0 FATTY LIVER: ICD-10-CM

## 2021-03-02 DIAGNOSIS — G43.909 MIGRAINE WITHOUT STATUS MIGRAINOSUS, NOT INTRACTABLE, UNSPECIFIED MIGRAINE TYPE: Primary | ICD-10-CM

## 2021-03-02 LAB
ALBUMIN SERPL-MCNC: 4.2 G/DL (ref 3.5–5.2)
ALP BLD-CCNC: 67 U/L (ref 35–104)
ALT SERPL-CCNC: 14 U/L (ref 0–32)
ANION GAP SERPL CALCULATED.3IONS-SCNC: 11 MMOL/L (ref 7–16)
AST SERPL-CCNC: 17 U/L (ref 0–31)
BILIRUB SERPL-MCNC: 0.2 MG/DL (ref 0–1.2)
BUN BLDV-MCNC: 10 MG/DL (ref 6–20)
CALCIUM SERPL-MCNC: 8.9 MG/DL (ref 8.6–10.2)
CHLORIDE BLD-SCNC: 104 MMOL/L (ref 98–107)
CO2: 23 MMOL/L (ref 22–29)
CREAT SERPL-MCNC: 0.6 MG/DL (ref 0.5–1)
GFR AFRICAN AMERICAN: >60
GFR NON-AFRICAN AMERICAN: >60 ML/MIN/1.73
GLUCOSE BLD-MCNC: 115 MG/DL (ref 74–99)
POTASSIUM SERPL-SCNC: 3.4 MMOL/L (ref 3.5–5)
SODIUM BLD-SCNC: 138 MMOL/L (ref 132–146)
TOTAL PROTEIN: 7.5 G/DL (ref 6.4–8.3)

## 2021-03-02 PROCEDURE — G8484 FLU IMMUNIZE NO ADMIN: HCPCS | Performed by: FAMILY MEDICINE

## 2021-03-02 PROCEDURE — G8421 BMI NOT CALCULATED: HCPCS | Performed by: FAMILY MEDICINE

## 2021-03-02 PROCEDURE — 1036F TOBACCO NON-USER: CPT | Performed by: FAMILY MEDICINE

## 2021-03-02 PROCEDURE — 99214 OFFICE O/P EST MOD 30 MIN: CPT | Performed by: FAMILY MEDICINE

## 2021-03-02 PROCEDURE — G8427 DOCREV CUR MEDS BY ELIG CLIN: HCPCS | Performed by: FAMILY MEDICINE

## 2021-03-02 RX ORDER — ALPRAZOLAM 2 MG/1
2 TABLET ORAL 3 TIMES DAILY
Qty: 90 TABLET | Refills: 1 | Status: CANCELLED | OUTPATIENT
Start: 2021-03-02 | End: 2021-04-01

## 2021-03-02 RX ORDER — SUMATRIPTAN 25 MG/1
25 TABLET, FILM COATED ORAL
Qty: 9 TABLET | Refills: 5 | Status: SHIPPED
Start: 2021-03-02 | End: 2021-03-03 | Stop reason: SDUPTHER

## 2021-03-02 RX ORDER — ZOLPIDEM TARTRATE 10 MG/1
5 TABLET ORAL NIGHTLY PRN
Qty: 30 TABLET | Refills: 5 | Status: CANCELLED | OUTPATIENT
Start: 2021-03-02 | End: 2021-04-01

## 2021-03-02 RX ORDER — GABAPENTIN 300 MG/1
300 CAPSULE ORAL 3 TIMES DAILY
Qty: 90 CAPSULE | Refills: 5 | Status: CANCELLED | OUTPATIENT
Start: 2021-03-02 | End: 2021-04-01

## 2021-03-02 ASSESSMENT — PATIENT HEALTH QUESTIONNAIRE - PHQ9
SUM OF ALL RESPONSES TO PHQ QUESTIONS 1-9: 0
SUM OF ALL RESPONSES TO PHQ9 QUESTIONS 1 & 2: 0
2. FEELING DOWN, DEPRESSED OR HOPELESS: 0

## 2021-03-02 NOTE — PROGRESS NOTES
3/2/2021     Maya Newsome    : 1975 Sex: female   Age: 39 y.o. No chief complaint on file. HPI: This 39y.o. -year-old female  presents today for evaluation and management of her  chronic medical problems. Current medication list reviewed. The patient is tolerating all medications well without adverse events or known side effects. The patient does understand the risk and benefits of the prescribed medications. The patient is not up-to-date on all age-appropriate wellness issues. Patient presents today stating she has had some breakthrough migraine headaches. The patient holds the use of her Topamax when she has her kids for visitation rights. The patient also presents today with increased shortness of breath. ROS:   Const: Denies changes in appetite, chills, fever, night sweats and weight loss. Eyes:  Denies discharge, a recent change in visual acuity, blurred vision and double vision. ENMT: Denies discharge of the ears, hearing loss, pain of the ears. Denies nasal or sinus symptoms other than stated above. Denies mouth or throat symptoms. CV:  Denies chest pain, dyspnea on exertion, orthopnea, palpitations and PND  Resp: Denies chest pain, cough, SOB and wheezing. GI: Denies abdominal pain, constipation, diarrhea, heartburn, indigestion, nausea and vomiting. : Denies dysuria, frequency, hematuria, nocturia and urgency. Musculo: Denies arthralgias and myalgia  Skin:  Denies lesions, pruritus and rash. Neuro: Denies dizziness, lightheadedness, numbness, tingling and weakness. Psych:  Denies anxiety and depression  Endocrine: Denies polyuria, polydipsia, polyphagia, weight gain, dry skin, constipation, fatigue, cold intolerance, heat intolerance or tremors. Hema/Lymph: Denies hematologic symptoms  Allergy/Immuno:  Denies allergic/immunologic symptoms.   Pertinent positives reviewed and noted      Current Outpatient Medications:   SUMAtriptan (IMITREX) 25 MG tablet, Take 1 tablet by mouth once as needed for Migraine, Disp: 9 tablet, Rfl: 5    DEXILANT 60 MG CPDR delayed release capsule, Take 1 capsule by mouth daily, Disp: 30 capsule, Rfl: 5    albuterol sulfate  (90 Base) MCG/ACT inhaler, Inhale 2 puffs into the lungs every 6 hours as needed for Wheezing, Disp: 1 Inhaler, Rfl: 2    furosemide (LASIX) 20 MG tablet, Take 1 tablet by mouth daily, Disp: 30 tablet, Rfl: 3    ketotifen (ALAWAY) 0.025 % ophthalmic solution, Place 1 drop into both eyes 2 times daily, Disp: 1 Bottle, Rfl: 3    QUEtiapine (SEROQUEL) 200 MG tablet, Take 1 tablet by mouth daily, Disp: 30 tablet, Rfl: 3    sucralfate (CARAFATE) 1 GM tablet, Take 1 tablet by mouth 3 times daily, Disp: 120 tablet, Rfl: 5    topiramate (TOPAMAX) 25 MG tablet, Take 1 tablet by mouth daily, Disp: 30 tablet, Rfl: 3    methylPREDNISolone (MEDROL DOSEPACK) 4 MG tablet, Take by mouth. (Patient not taking: Reported on 9/28/2020), Disp: 21 tablet, Rfl: 0    Handicap Placard MISC, by Does not apply route Patient cannot walk 200 ft without stopping to rest.   Expiration 09/02/2025. (Patient not taking: Reported on 12/2/2020), Disp: 1 each, Rfl: 0    fluconazole (DIFLUCAN) 150 MG tablet, Take 150 mg by mouth once 1 by mouth now, and may repeat in 24 hours, Disp: , Rfl:     gabapentin (NEURONTIN) 300 MG capsule, Take 300 mg by mouth 3 times daily. , Disp: , Rfl:     pantoprazole (PROTONIX) 40 MG tablet, Take 40 mg by mouth daily, Disp: , Rfl:     ALPRAZolam (XANAX) 2 MG tablet, Take 2 mg by mouth 3 times daily. 1 by mouth three times a day, Disp: , Rfl:     zolpidem (AMBIEN) 10 MG tablet, Take by mouth nightly as needed for Sleep.  1 by mouth every night at bedtime as needed; sleep, Disp: , Rfl:     permethrin (ELIMITE) 5 % cream, Apply topically once Apply topically as directed, Disp: , Rfl:   fluticasone (FLONASE) 50 MCG/ACT nasal spray, 2 sprays by Each Nostril route daily 2 sprays each nostril daily, Disp: , Rfl:     triamcinolone (KENALOG) 0.1 % cream, Apply topically 2 times daily. (Patient not taking: Reported on 9/28/2020), Disp: 1 Tube, Rfl: 2    Allergies   Allergen Reactions    Latex Itching and Rash    Morphine Hives    Penicillins Hives       History reviewed. No pertinent past medical history. Social History     Socioeconomic History    Marital status: Single     Spouse name: Not on file    Number of children: Not on file    Years of education: Not on file    Highest education level: Not on file   Occupational History    Not on file   Social Needs    Financial resource strain: Not on file    Food insecurity     Worry: Not on file     Inability: Not on file    Transportation needs     Medical: Not on file     Non-medical: Not on file   Tobacco Use    Smoking status: Never Smoker    Smokeless tobacco: Never Used   Substance and Sexual Activity    Alcohol use: Not Currently    Drug use: Never    Sexual activity: Yes   Lifestyle    Physical activity     Days per week: Not on file     Minutes per session: Not on file    Stress: Not on file   Relationships    Social connections     Talks on phone: Not on file     Gets together: Not on file     Attends Congregation service: Not on file     Active member of club or organization: Not on file     Attends meetings of clubs or organizations: Not on file     Relationship status: Not on file    Intimate partner violence     Fear of current or ex partner: Not on file     Emotionally abused: Not on file     Physically abused: Not on file     Forced sexual activity: Not on file   Other Topics Concern    Not on file   Social History Narrative    Not on file     History reviewed. No pertinent surgical history. History reviewed. No pertinent family history. There were no vitals filed for this visit. Exam: Const: Appears healthy and well developed. No signs of acute distress present. Eyes: PERRL  ENMT: Tympanic membranes are intact. Nasal mucosa intact without noted erythema Septum is in the midline. Posterior pharynx shows no exudate, irritation or redness. Neck:  Supple without adenopathy. Adequate range of motion   Resp: No rales, rhonchi, wheezes appreciated over the lungs bilaterally. CV: S1, S2 within normal limits. Regular rate and rhythm noted. Without murmur, gallop or rub. Extremities:  Pulses intact. Without noted edema. Abdomen: Positive bowel sounds. Palpation reveals softness, with no distension, organomegaly or tenderness. No abdominal masses palpable. Skin: Skin is warm and dry. Musculo: Unchanged upon examination. Neuro: Alert and oriented X3. Cranial nerves grossly intact. Psych: Mood is normal.  Affect is normal.   Vital signs reviewed. Controlled Substances Monitoring:     No flowsheet data found. Plan Per Assessment:  Diagnoses and all orders for this visit:    Migraine without status migrainosus, not intractable, unspecified migraine type    Shortness of breath  -     XR CHEST STANDARD (2 VW); Future    Fatty liver  -     Comprehensive Metabolic Panel; Future    Other orders  -     SUMAtriptan (IMITREX) 25 MG tablet; Take 1 tablet by mouth once as needed for Migraine        Return in about 3 months (around 6/2/2021) for MEDICATION CHECK, FOLLOW UP CHRONIC MEDICAL PROBLEMS. Noel Valdez MD    Note was generated with the assistance of voice recognition software. Document was reviewed however may contain grammatical errors.

## 2021-03-03 RX ORDER — ALBUTEROL SULFATE 90 UG/1
2 AEROSOL, METERED RESPIRATORY (INHALATION) EVERY 6 HOURS PRN
Qty: 1 INHALER | Refills: 2 | Status: SHIPPED | OUTPATIENT
Start: 2021-03-03

## 2021-03-03 RX ORDER — FUROSEMIDE 20 MG/1
20 TABLET ORAL DAILY
Qty: 30 TABLET | Refills: 3 | Status: SHIPPED | OUTPATIENT
Start: 2021-03-03 | End: 2021-07-16 | Stop reason: SDUPTHER

## 2021-03-03 RX ORDER — SUCRALFATE 1 G/1
1 TABLET ORAL 3 TIMES DAILY
Qty: 120 TABLET | Refills: 5 | Status: SHIPPED | OUTPATIENT
Start: 2021-03-03 | End: 2021-07-16 | Stop reason: SDUPTHER

## 2021-03-03 RX ORDER — QUETIAPINE FUMARATE 200 MG/1
200 TABLET, FILM COATED ORAL DAILY
Qty: 30 TABLET | Refills: 3 | Status: SHIPPED | OUTPATIENT
Start: 2021-03-03 | End: 2021-07-16 | Stop reason: SDUPTHER

## 2021-03-03 RX ORDER — PANTOPRAZOLE SODIUM 40 MG/1
40 TABLET, DELAYED RELEASE ORAL DAILY
Qty: 30 TABLET | Refills: 5 | Status: SHIPPED | OUTPATIENT
Start: 2021-03-03 | End: 2021-07-16 | Stop reason: SDUPTHER

## 2021-03-03 RX ORDER — KETOTIFEN FUMARATE 0.35 MG/ML
1 SOLUTION/ DROPS OPHTHALMIC 2 TIMES DAILY
Qty: 1 BOTTLE | Refills: 3 | Status: SHIPPED | OUTPATIENT
Start: 2021-03-03

## 2021-03-03 RX ORDER — FLUCONAZOLE 150 MG/1
150 TABLET ORAL ONCE
Qty: 1 TABLET | Refills: 0 | Status: SHIPPED | OUTPATIENT
Start: 2021-03-03 | End: 2021-03-03

## 2021-03-03 RX ORDER — SUMATRIPTAN 25 MG/1
25 TABLET, FILM COATED ORAL
Qty: 9 TABLET | Refills: 5 | Status: SHIPPED | OUTPATIENT
Start: 2021-03-03 | End: 2021-07-16 | Stop reason: SDUPTHER

## 2021-03-03 RX ORDER — TOPIRAMATE 25 MG/1
25 TABLET ORAL DAILY
Qty: 30 TABLET | Refills: 3 | Status: SHIPPED | OUTPATIENT
Start: 2021-03-03

## 2021-03-03 RX ORDER — FLUTICASONE PROPIONATE 50 MCG
2 SPRAY, SUSPENSION (ML) NASAL DAILY
Qty: 1 BOTTLE | Refills: 5 | Status: SHIPPED | OUTPATIENT
Start: 2021-03-03

## 2021-03-03 RX ORDER — DEXLANSOPRAZOLE 60 MG/1
60 CAPSULE, DELAYED RELEASE ORAL DAILY
Qty: 30 CAPSULE | Refills: 5 | Status: SHIPPED | OUTPATIENT
Start: 2021-03-03 | End: 2021-07-16 | Stop reason: SDUPTHER

## 2021-03-25 ENCOUNTER — TELEPHONE (OUTPATIENT)
Dept: PRIMARY CARE CLINIC | Age: 46
End: 2021-03-25

## 2021-03-25 DIAGNOSIS — E87.6 HYPOKALEMIA: Primary | ICD-10-CM

## 2021-03-25 DIAGNOSIS — E87.6 HYPOKALEMIA: ICD-10-CM

## 2021-03-25 LAB
ANION GAP SERPL CALCULATED.3IONS-SCNC: 13 MMOL/L (ref 7–16)
BUN BLDV-MCNC: 13 MG/DL (ref 6–20)
CALCIUM SERPL-MCNC: 9.2 MG/DL (ref 8.6–10.2)
CHLORIDE BLD-SCNC: 105 MMOL/L (ref 98–107)
CO2: 22 MMOL/L (ref 22–29)
CREAT SERPL-MCNC: 0.6 MG/DL (ref 0.5–1)
GFR AFRICAN AMERICAN: >60
GFR NON-AFRICAN AMERICAN: >60 ML/MIN/1.73
GLUCOSE BLD-MCNC: 87 MG/DL (ref 74–99)
POTASSIUM SERPL-SCNC: 3.8 MMOL/L (ref 3.5–5)
SODIUM BLD-SCNC: 140 MMOL/L (ref 132–146)

## 2021-03-25 NOTE — TELEPHONE ENCOUNTER
Last Appointment:  3/2/2021  Future Appointments   Date Time Provider Sia Schulz   6/8/2021  2:00 PM Sneha Mohan MD [facility]      Patient will be in next week to have labs done. Needs orders. Please advise.     Electronically signed by Raheem Padilla LPN on 3/86/1140 at 7:39 PM

## 2021-03-30 ENCOUNTER — TELEPHONE (OUTPATIENT)
Dept: PRIMARY CARE CLINIC | Age: 46
End: 2021-03-30

## 2021-03-31 ENCOUNTER — TELEPHONE (OUTPATIENT)
Dept: PRIMARY CARE CLINIC | Age: 46
End: 2021-03-31

## 2021-03-31 NOTE — TELEPHONE ENCOUNTER
Pt states she need a referral for therapy to get another assessment for  A wheelchair. Luis Daniel will be sending paperwork for that.       Last therapy assessment was at the physical therapy [facility] hospital

## 2021-04-06 ENCOUNTER — OFFICE VISIT (OUTPATIENT)
Dept: PRIMARY CARE CLINIC | Age: 46
End: 2021-04-06
Payer: COMMERCIAL

## 2021-04-06 VITALS — OXYGEN SATURATION: 98 % | TEMPERATURE: 77 F | DIASTOLIC BLOOD PRESSURE: 80 MMHG | SYSTOLIC BLOOD PRESSURE: 130 MMHG

## 2021-04-06 DIAGNOSIS — R53.1 WEAKNESS: Primary | ICD-10-CM

## 2021-04-06 DIAGNOSIS — E87.6 HYPOKALEMIA: ICD-10-CM

## 2021-04-06 DIAGNOSIS — R53.1 WEAKNESS: ICD-10-CM

## 2021-04-06 LAB
ANION GAP SERPL CALCULATED.3IONS-SCNC: 18 MMOL/L (ref 7–16)
BUN BLDV-MCNC: 10 MG/DL (ref 6–20)
CALCIUM SERPL-MCNC: 9.4 MG/DL (ref 8.6–10.2)
CHLORIDE BLD-SCNC: 106 MMOL/L (ref 98–107)
CO2: 20 MMOL/L (ref 22–29)
CREAT SERPL-MCNC: 0.5 MG/DL (ref 0.5–1)
GFR AFRICAN AMERICAN: >60
GFR NON-AFRICAN AMERICAN: >60 ML/MIN/1.73
GLUCOSE BLD-MCNC: 88 MG/DL (ref 74–99)
POTASSIUM SERPL-SCNC: 4.1 MMOL/L (ref 3.5–5)
SODIUM BLD-SCNC: 144 MMOL/L (ref 132–146)

## 2021-04-06 PROCEDURE — 99213 OFFICE O/P EST LOW 20 MIN: CPT | Performed by: FAMILY MEDICINE

## 2021-04-06 PROCEDURE — G8427 DOCREV CUR MEDS BY ELIG CLIN: HCPCS | Performed by: FAMILY MEDICINE

## 2021-04-06 PROCEDURE — 1036F TOBACCO NON-USER: CPT | Performed by: FAMILY MEDICINE

## 2021-04-06 PROCEDURE — G8421 BMI NOT CALCULATED: HCPCS | Performed by: FAMILY MEDICINE

## 2021-04-06 NOTE — PROGRESS NOTES
2021     Marck Valdez    : 1975 Sex: female   Age: 39 y.o. Chief Complaint   Patient presents with    Other     Weakness lower extremities. HPI: This 39y.o. -year-old female  presents today for evaluation and management of her  chronic medical problems. Patient presents today with complaint of lower extremity weakness as well as numbness of the left leg. The patient states she was recently trying to get up to go to the bathroom when she grabbed her support bar pivoted and felt extremely weak. Current medication list reviewed. The patient is tolerating all medications well without adverse events or known side effects. The patient does understand the risk and benefits of the prescribed medications. The patient is up-to-date on all age-appropriate wellness issues. ROS:   Const: Denies changes in appetite, chills, fever, night sweats and weight loss. Eyes:  Denies discharge, a recent change in visual acuity, blurred vision and double vision. ENMT: Denies discharge of the ears, hearing loss, pain of the ears. Denies nasal or sinus symptoms other than stated above. Denies mouth or throat symptoms. CV:  Denies chest pain, dyspnea on exertion, orthopnea, palpitations and PND  Resp: Denies chest pain, cough, SOB and wheezing. GI: Denies abdominal pain, constipation, diarrhea, heartburn, indigestion, nausea and vomiting. : Denies dysuria, frequency, hematuria, nocturia and urgency. Musculo: Denies arthralgias and myalgia  Skin:  Denies lesions, pruritus and rash. Neuro: Denies dizziness, lightheadedness, numbness, tingling and weakness. Psych:  Denies anxiety and depression  Endocrine: Denies polyuria, polydipsia, polyphagia, weight gain, dry skin, constipation, fatigue, cold intolerance, heat intolerance or tremors. Hema/Lymph: Denies hematologic symptoms  Allergy/Immuno:  Denies allergic/immunologic symptoms.   Pertinent positives reviewed and noted      Current Outpatient Medications:     albuterol sulfate  (90 Base) MCG/ACT inhaler, Inhale 2 puffs into the lungs every 6 hours as needed for Wheezing, Disp: 1 Inhaler, Rfl: 2    DEXILANT 60 MG CPDR delayed release capsule, Take 1 capsule by mouth daily, Disp: 30 capsule, Rfl: 5    furosemide (LASIX) 20 MG tablet, Take 1 tablet by mouth daily, Disp: 30 tablet, Rfl: 3    ketotifen (ALAWAY) 0.025 % ophthalmic solution, Place 1 drop into both eyes 2 times daily, Disp: 1 Bottle, Rfl: 3    sucralfate (CARAFATE) 1 GM tablet, Take 1 tablet by mouth 3 times daily, Disp: 120 tablet, Rfl: 5    topiramate (TOPAMAX) 25 MG tablet, Take 1 tablet by mouth daily, Disp: 30 tablet, Rfl: 3    fluticasone (FLONASE) 50 MCG/ACT nasal spray, 2 sprays by Each Nostril route daily 2 sprays each nostril daily, Disp: 1 Bottle, Rfl: 5    pantoprazole (PROTONIX) 40 MG tablet, Take 1 tablet by mouth daily, Disp: 30 tablet, Rfl: 5    methylPREDNISolone (MEDROL DOSEPACK) 4 MG tablet, Take by mouth., Disp: 21 tablet, Rfl: 0    Handicap Placard MISC, by Does not apply route Patient cannot walk 200 ft without stopping to rest.   Expiration 09/02/2025., Disp: 1 each, Rfl: 0    gabapentin (NEURONTIN) 300 MG capsule, Take 300 mg by mouth 3 times daily. , Disp: , Rfl:     ALPRAZolam (XANAX) 2 MG tablet, Take 2 mg by mouth 3 times daily. 1 by mouth three times a day, Disp: , Rfl:     zolpidem (AMBIEN) 10 MG tablet, Take by mouth nightly as needed for Sleep. 1 by mouth every night at bedtime as needed; sleep, Disp: , Rfl:     triamcinolone (KENALOG) 0.1 % cream, Apply topically 2 times daily. , Disp: 1 Tube, Rfl: 2    QUEtiapine (SEROQUEL) 200 MG tablet, Take 1 tablet by mouth daily, Disp: 30 tablet, Rfl: 3    SUMAtriptan (IMITREX) 25 MG tablet, Take 1 tablet by mouth once as needed for Migraine, Disp: 9 tablet, Rfl: 5    Allergies   Allergen Reactions    Latex Itching and Rash    Morphine Hives    Penicillins Hives       History reviewed.  No pertinent bilaterally. CV: S1, S2 within normal limits. Regular rate and rhythm noted. Without murmur, gallop or rub. Extremities:  Pulses intact. Without noted edema. Abdomen: Positive bowel sounds. Palpation reveals softness, with no distension, organomegaly or tenderness. No abdominal masses palpable. Skin: Skin is warm and dry. Musculo: Unchanged upon examination. Neuro: Alert and oriented X3. Cranial nerves grossly intact. Psych: Mood is normal.  Affect is normal.   Vital signs reviewed. Controlled Substances Monitoring:     No flowsheet data found. Plan Per Assessment:  Foreign Dan was seen today for other. Diagnoses and all orders for this visit:    Weakness  -     Basic Metabolic Panel; Future    Hypokalemia  -     Basic Metabolic Panel; Future        Return for MEDICATION CHECK, FOLLOW UP CHRONIC MEDICAL PROBLEMS. Inga Basurto MD    Note was generated with the assistance of voice recognition software. Document was reviewed however may contain grammatical errors.

## 2021-04-28 ENCOUNTER — TELEPHONE (OUTPATIENT)
Dept: PRIMARY CARE CLINIC | Age: 46
End: 2021-04-28

## 2021-04-28 NOTE — TELEPHONE ENCOUNTER
Maddie Alfonso called and asked if you have received the form for her wheelchair from [facility]. They told her they have faxed it a few times and it needs a signature and faxed back.

## 2021-07-14 ENCOUNTER — TELEPHONE (OUTPATIENT)
Dept: PRIMARY CARE CLINIC | Age: 46
End: 2021-07-14

## 2021-07-14 NOTE — TELEPHONE ENCOUNTER
Patient called in stating she is trying to get her new wheelchair and all the paperwork was received however Luis Daniel is requiring a visit with the doctor and the note has to say she is wheelchair bound. She said she needs to have this done asap. Would you be able to do a virtual with her to get this done?

## 2021-07-16 ENCOUNTER — TELEPHONE (OUTPATIENT)
Dept: FAMILY MEDICINE CLINIC | Age: 46
End: 2021-07-16

## 2021-07-16 ENCOUNTER — VIRTUAL VISIT (OUTPATIENT)
Dept: PRIMARY CARE CLINIC | Age: 46
End: 2021-07-16
Payer: COMMERCIAL

## 2021-07-16 DIAGNOSIS — R53.1 WEAKNESS: ICD-10-CM

## 2021-07-16 DIAGNOSIS — R60.9 PERIPHERAL EDEMA: ICD-10-CM

## 2021-07-16 DIAGNOSIS — G80.9 CEREBRAL PALSY, UNSPECIFIED TYPE (HCC): Primary | ICD-10-CM

## 2021-07-16 DIAGNOSIS — F41.9 ANXIETY: ICD-10-CM

## 2021-07-16 DIAGNOSIS — K21.9 GASTROESOPHAGEAL REFLUX DISEASE WITHOUT ESOPHAGITIS: ICD-10-CM

## 2021-07-16 DIAGNOSIS — R06.01 ORTHOPNEA: ICD-10-CM

## 2021-07-16 DIAGNOSIS — G43.909 MIGRAINE WITHOUT STATUS MIGRAINOSUS, NOT INTRACTABLE, UNSPECIFIED MIGRAINE TYPE: ICD-10-CM

## 2021-07-16 PROCEDURE — G8427 DOCREV CUR MEDS BY ELIG CLIN: HCPCS | Performed by: NURSE PRACTITIONER

## 2021-07-16 PROCEDURE — G8421 BMI NOT CALCULATED: HCPCS | Performed by: NURSE PRACTITIONER

## 2021-07-16 PROCEDURE — 1036F TOBACCO NON-USER: CPT | Performed by: NURSE PRACTITIONER

## 2021-07-16 PROCEDURE — 99214 OFFICE O/P EST MOD 30 MIN: CPT | Performed by: NURSE PRACTITIONER

## 2021-07-16 RX ORDER — DEXLANSOPRAZOLE 60 MG/1
60 CAPSULE, DELAYED RELEASE ORAL DAILY
Qty: 30 CAPSULE | Refills: 5 | Status: SHIPPED | OUTPATIENT
Start: 2021-07-16

## 2021-07-16 RX ORDER — PANTOPRAZOLE SODIUM 40 MG/1
40 TABLET, DELAYED RELEASE ORAL DAILY
Qty: 30 TABLET | Refills: 5 | Status: SHIPPED | OUTPATIENT
Start: 2021-07-16

## 2021-07-16 RX ORDER — SUMATRIPTAN 25 MG/1
25 TABLET, FILM COATED ORAL
Qty: 9 TABLET | Refills: 5 | Status: SHIPPED | OUTPATIENT
Start: 2021-07-16 | End: 2021-12-28

## 2021-07-16 RX ORDER — POTASSIUM CHLORIDE 750 MG/1
10 TABLET, EXTENDED RELEASE ORAL DAILY
Qty: 30 TABLET | Refills: 0 | Status: SHIPPED | OUTPATIENT
Start: 2021-07-16 | End: 2021-12-28

## 2021-07-16 RX ORDER — ZOLPIDEM TARTRATE 10 MG/1
10 TABLET ORAL NIGHTLY PRN
Qty: 30 TABLET | Refills: 0 | Status: CANCELLED | OUTPATIENT
Start: 2021-07-16 | End: 2021-08-15

## 2021-07-16 RX ORDER — SUCRALFATE 1 G/1
1 TABLET ORAL 3 TIMES DAILY
Qty: 120 TABLET | Refills: 5 | Status: SHIPPED | OUTPATIENT
Start: 2021-07-16

## 2021-07-16 RX ORDER — QUETIAPINE FUMARATE 200 MG/1
200 TABLET, FILM COATED ORAL DAILY
Qty: 30 TABLET | Refills: 3 | Status: SHIPPED | OUTPATIENT
Start: 2021-07-16 | End: 2021-12-28

## 2021-07-16 RX ORDER — ALPRAZOLAM 2 MG/1
2 TABLET ORAL 3 TIMES DAILY
Qty: 93 TABLET | Refills: 3 | Status: CANCELLED | OUTPATIENT
Start: 2021-07-16 | End: 2021-08-16

## 2021-07-16 RX ORDER — FUROSEMIDE 20 MG/1
20 TABLET ORAL DAILY
Qty: 30 TABLET | Refills: 3 | Status: SHIPPED | OUTPATIENT
Start: 2021-07-16

## 2021-07-16 NOTE — PROGRESS NOTES
2021   TELEHEALTH EVALUATION -- Audio/Visual (During TOYVX-39 public health emergency)    Suzi Noonan 39 y.o. female    : 1975    TeleMedicine Patient Consent    This visit was performed as a virtual video visit using a synchronous, two-way, audio-video telehealth technology platform. Patient identification was verified at the start of the visit, including the patient's telephone number and physical location. I discussed with the patient the nature of our telehealth visits, that:     1. Due to the nature of an audio- video modality, the only components of a physical exam that could be done are the elements supported by direct observation. 2. I would evaluate the patient and recommend diagnostics and treatments based on my assessment. 3. If it was felt that the patient should be evaluated in clinic or an emergency room setting, then they would be directed there. 4. Our sessions are not being recorded and that personal health information is protected. 5. Our team would provide follow up care in person if/when the patient needs it. Patient does agree to proceed with telemedicine consultation. Patient's location: home address in [facility]    Physician location: home office site  Other people involved in call: none    This visit was completed virtually using Doxy. me        Chief Complaint:   Other (this visit is for an electric wheelchair)       History of Present Illness:   Suzi Noonan is a 39 y.o. female who has requested an audio/visual telehealth evaluation for follow up. They were a previous Dr. Ade Gallardo patient. Patient has a pertinent past medical history of cerebral palsy and is wheelchair bound due to bilateral lower extremity weakness. Peripheral edema -worsening. Patient has been taking Lasix 20mg every other day with minimal improvement. She has trialed compression stockings in the past but does not tolerate them. Anxiety-controlled. She participates in group counseling. She does not follow with psychiatry. She states that she has been taking Xanax and Ambien. Last fill date was in 2019. Migraines-controlled on medications. The patient is not up-to-date with health maintenance screenings. Previous lab work was reviewed. Past Medical History:   History reviewed. No pertinent past medical history. History reviewed. No pertinent surgical history. History reviewed. No pertinent family history.     Social History     Tobacco Use    Smoking status: Never Smoker    Smokeless tobacco: Never Used   Substance Use Topics    Alcohol use: Not Currently    Drug use: Never       Medications:     Current Outpatient Medications:     DEXILANT 60 MG CPDR delayed release capsule, Take 1 capsule by mouth daily, Disp: 30 capsule, Rfl: 5    pantoprazole (PROTONIX) 40 MG tablet, Take 1 tablet by mouth daily, Disp: 30 tablet, Rfl: 5    sucralfate (CARAFATE) 1 GM tablet, Take 1 tablet by mouth 3 times daily, Disp: 120 tablet, Rfl: 5    SUMAtriptan (IMITREX) 25 MG tablet, Take 1 tablet by mouth once as needed for Migraine, Disp: 9 tablet, Rfl: 5    furosemide (LASIX) 20 MG tablet, Take 1 tablet by mouth daily, Disp: 30 tablet, Rfl: 3    QUEtiapine (SEROQUEL) 200 MG tablet, Take 1 tablet by mouth daily, Disp: 30 tablet, Rfl: 3    potassium chloride (KLOR-CON M) 10 MEQ extended release tablet, Take 1 tablet by mouth daily, Disp: 30 tablet, Rfl: 0    albuterol sulfate  (90 Base) MCG/ACT inhaler, Inhale 2 puffs into the lungs every 6 hours as needed for Wheezing, Disp: 1 Inhaler, Rfl: 2    ketotifen (ALAWAY) 0.025 % ophthalmic solution, Place 1 drop into both eyes 2 times daily, Disp: 1 Bottle, Rfl: 3    topiramate (TOPAMAX) 25 MG tablet, Take 1 tablet by mouth daily, Disp: 30 tablet, Rfl: 3    fluticasone (FLONASE) 50 MCG/ACT nasal spray, 2 sprays by Each Nostril route daily 2 sprays each nostril daily, Disp: 1 Bottle, Rfl: 5    Handicap Placard MISC, by Does not apply route Patient cannot walk 200 ft without stopping to rest.   Expiration 09/02/2025., Disp: 1 each, Rfl: 0    gabapentin (NEURONTIN) 300 MG capsule, Take 300 mg by mouth 3 times daily. , Disp: , Rfl:     ALPRAZolam (XANAX) 2 MG tablet, Take 2 mg by mouth 3 times daily. 1 by mouth three times a day, Disp: , Rfl:     zolpidem (AMBIEN) 10 MG tablet, Take by mouth nightly as needed for Sleep. 1 by mouth every night at bedtime as needed; sleep, Disp: , Rfl:     triamcinolone (KENALOG) 0.1 % cream, Apply topically 2 times daily. , Disp: 1 Tube, Rfl: 2    methylPREDNISolone (MEDROL DOSEPACK) 4 MG tablet, Take by mouth., Disp: 21 tablet, Rfl: 0    Allergies   Allergen Reactions    Latex Itching and Rash    Morphine Hives    Penicillins Hives       Review of Systems:   Review of Systems   Constitutional: Negative for activity change, appetite change, fatigue, fever and unexpected weight change. HENT: Negative for congestion, facial swelling, mouth sores, postnasal drip, rhinorrhea, sneezing, tinnitus and voice change. Eyes: Negative. Respiratory: Negative for apnea, cough, chest tightness, shortness of breath and wheezing. Cardiovascular: Positive for leg swelling. Negative for chest pain and palpitations. Gastrointestinal: Negative for abdominal distention, abdominal pain, constipation, diarrhea, nausea and vomiting. Endocrine: Negative for cold intolerance and heat intolerance. Genitourinary: Negative for difficulty urinating, dysuria, flank pain, frequency, pelvic pain and urgency. Musculoskeletal: Negative for back pain, gait problem, joint swelling, myalgias and neck pain. Skin: Negative for color change, pallor, rash and wound. Allergic/Immunologic: Negative for environmental allergies and food allergies. Neurological: Positive for weakness. Negative for dizziness, tremors, seizures, syncope, facial asymmetry, speech difficulty, light-headedness, numbness and headaches.         Bilateral lower extremities   Psychiatric/Behavioral: Negative for agitation, behavioral problems, confusion, decreased concentration, dysphoric mood, sleep disturbance and suicidal ideas. The patient is not hyperactive. Physical Exam:   PHYSICAL EXAMINATION:  [ INSTRUCTIONS:  \"[x]\" Indicates a positive item  \"[]\" Indicates a negative item  -- DELETE ALL ITEMS NOT EXAMINED]  Vital Signs: (As obtained by patient/caregiver or practitioner observation)    Blood pressure-  Heart rate-    Respiratory rate-    Temperature-  Pulse oximetry-     Constitutional: [x] Appears well-developed and well-nourished [x] No apparent distress      [] Abnormal-   Mental status  [x] Alert and awake  [x] Oriented to person/place/time [x]Able to follow commands      Eyes:  EOM    [x]  Normal  [] Abnormal-  Sclera  [x]  Normal  [] Abnormal -         Discharge [x]  None visible  [] Abnormal -    HENT:   [x] Normocephalic, atraumatic.   [] Abnormal   [x] Mouth/Throat: Mucous membranes are moist.     External Ears [x] Normal  [] Abnormal-     Neck: [x] No visualized mass     Pulmonary/Chest: [x] Respiratory effort normal.  [x] No visualized signs of difficulty breathing or respiratory distress        [] Abnormal-      Musculoskeletal:   [] Normal gait with no signs of ataxia         [x] Normal range of motion of neck        [x] Abnormal-wheelchair-bound      Neurological:        [x] No Facial Asymmetry (Cranial nerve 7 motor function) (limited exam to video visit)          [x] No gaze palsy        [] Abnormal-         Skin:        [x] No significant exanthematous lesions or discoloration noted on facial skin         [] Abnormal-            Psychiatric:       [x] Normal Affect [x] No Hallucinations        [] Abnormal-     Other pertinent observable physical exam findings-+3 lower extremity edema  Health Maintenance:     Health Maintenance   Topic Date Due    Hepatitis C screen  Never done    COVID-19 Vaccine (1) Never done    DTaP/Tdap/Td vaccine (1 - Tdap) Never done    Cervical cancer screen  Never done    Colon cancer screen colonoscopy  Never done    Flu vaccine (1) 09/01/2021    Potassium monitoring  04/06/2022    Creatinine monitoring  04/06/2022    Lipid screen  09/02/2025    HIV screen  Completed    Hepatitis A vaccine  Aged Out    Hepatitis B vaccine  Aged Out    Hib vaccine  Aged Out    Meningococcal (ACWY) vaccine  Aged Out    Pneumococcal 0-64 years Vaccine  Aged Out          There is no immunization history on file for this patient. Testing: All laboratory and radiology results have been personally reviewed by myself. Labs:  No results found for this visit on 07/16/21. Imaging: All Radiology results interpreted by Radiologist unless otherwise noted. No results found. Assessment/Plan:   I personally reviewed the patient's allergies, past medical history, medications, and vitals sign. Edel Emely was seen today for other. Diagnoses and all orders for this visit:    Cerebral palsy, unspecified type ([facility] [address]    Peripheral edema  -     furosemide (LASIX) 20 MG tablet; Take 1 tablet by mouth daily  -     potassium chloride (KLOR-CON M) 10 MEQ extended release tablet; Take 1 tablet by mouth daily  -     Echocardiogram complete; Future    Orthopnea  -     Sleep Study with PAP Titration; Future    Weakness    Migraine without status migrainosus, not intractable, unspecified migraine type  -     SUMAtriptan (IMITREX) 25 MG tablet; Take 1 tablet by mouth once as needed for Migraine    Anxiety  -     QUEtiapine (SEROQUEL) 200 MG tablet; Take 1 tablet by mouth daily    Gastroesophageal reflux disease without esophagitis  -     pantoprazole (PROTONIX) 40 MG tablet; Take 1 tablet by mouth daily  -     sucralfate (CARAFATE) 1 GM tablet; Take 1 tablet by mouth 3 times daily    Other orders  -     DEXILANT 60 MG CPDR delayed release capsule;  Take 1 capsule by mouth daily        Rick Toledo, was evaluated through a synchronous (real-time) audio-video encounter. The patient (or guardian if applicable) is aware that this is a billable service. Verbal consent to proceed has been obtained within the past 12 months. The visit was conducted pursuant to the emergency declaration under the [facility], [address] [place] [address] authority and the [employer] Act. Patient identification was verified, and a caregiver was present when appropriate. The patient was located in a state where the provider was credentialed to provide care. Increase furosemide to daily, supplement with potassium. Elevate lower extremities as much as possible. Limit salt intake. Continue all other medications as prescribed. Order placed for electric wheelchair given that the patient is wheelchair-bound and unable to ambulate due to cerebral palsy. Order placed for echocardiogram and sleep study, will call with results. I did advise the patient that I am unable to fill the Xanax and Ambien given that this is not an active prescription and has not been filled since 2019. If she is seeking refills on this medication I will refer to psychiatry. The patient declines at this time. Call or go to ED immediately if symptoms worsen or persist.  Return in about 3 months for follow-up. Sooner if necessary. Patient was advised that I am covering all of Dr. Lianna Perkins for 90 days until September 1, 2021. At that time, they can choose to establish with me or they will have to find another provider for any further care and medication refills. Counseled regarding above diagnosis, including possible risks and complications,especially if left uncontrolled. Counseled regarding the possible side effects, risks, benefits and alternatives to treatment; patient and/or guardian verbalizes understanding. Advised patient to call with any new medication issues. All questions answered.   Reviewed age and gender appropriate health screening exams and vaccinations. Advised patient regarding importance of keeping up with recommended health maintenance and to schedule as soon as possible if overdue, as this is important in assessing for undiagnosed pathology, especially cancer. Patient verbalizes understanding and agrees. Total time spent on this encounter: 30 minutes    --KIKI Hamilton NP on 7/20/2021 at 12:48 PM    An electronic signature was used to authenticate this note. **This report was transcribed using voice recognition software. Every effort was made to ensure accuracy; however, inadvertent computerized transcription errors may be present.

## 2021-07-20 ASSESSMENT — ENCOUNTER SYMPTOMS
VOMITING: 0
FACIAL SWELLING: 0
VOICE CHANGE: 0
APNEA: 0
NAUSEA: 0
ABDOMINAL PAIN: 0
DIARRHEA: 0
BACK PAIN: 0
SHORTNESS OF BREATH: 0
WHEEZING: 0
COLOR CHANGE: 0
RHINORRHEA: 0
CHEST TIGHTNESS: 0
ABDOMINAL DISTENTION: 0
EYES NEGATIVE: 1
CONSTIPATION: 0
COUGH: 0

## 2021-07-21 ENCOUNTER — TELEPHONE (OUTPATIENT)
Dept: PRIMARY CARE CLINIC | Age: 46
End: 2021-07-21

## 2021-07-21 NOTE — TELEPHONE ENCOUNTER
----- Message from Kamala Lewis sent at 7/15/2021 12:56 PM EDT -----  Subject: Message to Provider    QUESTIONS  Information for Provider? patient seeing if Mushtaq Oseguera covers [employer]  ---------------------------------------------------------------------------  --------------  [address]  What is the best way for the office to contact you? OK to leave message on   voicemail  Preferred Call Back Phone Number? [phone]  ---------------------------------------------------------------------------  --------------  SCRIPT ANSWERS  Relationship to Patient? Third Party  Representative Name?  Nico Sevilla

## 2021-08-02 ENCOUNTER — TELEPHONE (OUTPATIENT)
Dept: PRIMARY CARE CLINIC | Age: 46
End: 2021-08-02

## 2021-08-02 NOTE — TELEPHONE ENCOUNTER
----- Message from Heaven Rosario sent at 7/30/2021  4:18 PM EDT -----  Subject: Message to Provider    QUESTIONS  Information for Provider? Shay Garcia had virtual appointment with patient   for a powered wheelchair. The company has not received the face to face   notes for approval of equipment. Please call [phone] of   [place]. This is for the insurance to pay.  ---------------------------------------------------------------------------  --------------  [employer] INFO  What is the best way for the office to contact you? OK to leave message on   voicemail  Preferred Call Back Phone Number? [phone]  ---------------------------------------------------------------------------  --------------  SCRIPT ANSWERS  Relationship to Patient?  Self

## 2021-08-30 ENCOUNTER — TELEPHONE (OUTPATIENT)
Dept: PRIMARY CARE CLINIC | Age: 46
End: 2021-08-30

## 2021-08-31 ENCOUNTER — OFFICE VISIT (OUTPATIENT)
Dept: FAMILY MEDICINE CLINIC | Age: 46
End: 2021-08-31
Payer: COMMERCIAL

## 2021-08-31 VITALS
HEART RATE: 87 BPM | DIASTOLIC BLOOD PRESSURE: 90 MMHG | OXYGEN SATURATION: 97 % | BODY MASS INDEX: 34.97 KG/M2 | WEIGHT: 230 LBS | TEMPERATURE: 97.9 F | SYSTOLIC BLOOD PRESSURE: 130 MMHG

## 2021-08-31 DIAGNOSIS — Z20.822 EXPOSURE TO COVID-19 VIRUS: ICD-10-CM

## 2021-08-31 DIAGNOSIS — R05.9 COUGH: Primary | ICD-10-CM

## 2021-08-31 PROCEDURE — 99213 OFFICE O/P EST LOW 20 MIN: CPT | Performed by: PHYSICIAN ASSISTANT

## 2021-08-31 PROCEDURE — 1036F TOBACCO NON-USER: CPT | Performed by: PHYSICIAN ASSISTANT

## 2021-08-31 PROCEDURE — G8427 DOCREV CUR MEDS BY ELIG CLIN: HCPCS | Performed by: PHYSICIAN ASSISTANT

## 2021-08-31 PROCEDURE — G8419 CALC BMI OUT NRM PARAM NOF/U: HCPCS | Performed by: PHYSICIAN ASSISTANT

## 2021-08-31 ASSESSMENT — ENCOUNTER SYMPTOMS
VOMITING: 0
COUGH: 1
ABDOMINAL PAIN: 0
PHOTOPHOBIA: 0
SHORTNESS OF BREATH: 0
BACK PAIN: 0
SORE THROAT: 0
DIARRHEA: 0
NAUSEA: 0

## 2021-08-31 NOTE — PROGRESS NOTES
21  Tatiana Silveira : 1975 Sex: female  Age 39 y.o. Subjective:  Chief Complaint   Patient presents with    Generalized Body Aches    Headache         70-year-old female presents to the walk-in clinic for evaluation of cough, congestion and body aches for the last couple days. She has not taking any over-the-counter medications. She has been exposed to COVID-19. She denies shortness of breath or chest pain. No fever chills. No nausea or vomiting. She is eating and drinking normally. No loss of taste or smell. She denies chance of pregnancy. Review of Systems   Constitutional: Negative for chills and fever. HENT: Positive for congestion. Negative for ear pain and sore throat. Eyes: Negative for photophobia and visual disturbance. Respiratory: Positive for cough. Negative for shortness of breath. Cardiovascular: Negative for chest pain. Gastrointestinal: Negative for abdominal pain, diarrhea, nausea and vomiting. Genitourinary: Negative for difficulty urinating, dysuria, frequency and urgency. Musculoskeletal: Positive for myalgias. Negative for back pain, neck pain and neck stiffness. Skin: Negative for rash. Neurological: Negative for dizziness, syncope, weakness, light-headedness and headaches. Hematological: Negative for adenopathy. Does not bruise/bleed easily. Psychiatric/Behavioral: Negative for agitation and confusion. All other systems reviewed and are negative. PMH:   History reviewed. No pertinent past medical history. History reviewed. No pertinent surgical history. History reviewed. No pertinent family history.     Medications:     Current Outpatient Medications:     DEXILANT 60 MG CPDR delayed release capsule, Take 1 capsule by mouth daily, Disp: 30 capsule, Rfl: 5    pantoprazole (PROTONIX) 40 MG tablet, Take 1 tablet by mouth daily, Disp: 30 tablet, Rfl: 5    sucralfate (CARAFATE) 1 GM tablet, Take 1 tablet by mouth 3 times daily, Disp: 120 tablet, Rfl: 5    furosemide (LASIX) 20 MG tablet, Take 1 tablet by mouth daily, Disp: 30 tablet, Rfl: 3    albuterol sulfate  (90 Base) MCG/ACT inhaler, Inhale 2 puffs into the lungs every 6 hours as needed for Wheezing, Disp: 1 Inhaler, Rfl: 2    ketotifen (ALAWAY) 0.025 % ophthalmic solution, Place 1 drop into both eyes 2 times daily, Disp: 1 Bottle, Rfl: 3    topiramate (TOPAMAX) 25 MG tablet, Take 1 tablet by mouth daily, Disp: 30 tablet, Rfl: 3    fluticasone (FLONASE) 50 MCG/ACT nasal spray, 2 sprays by Each Nostril route daily 2 sprays each nostril daily, Disp: 1 Bottle, Rfl: 5    Handicap Placard MISC, by Does not apply route Patient cannot walk 200 ft without stopping to rest.   Expiration 09/02/2025., Disp: 1 each, Rfl: 0    gabapentin (NEURONTIN) 300 MG capsule, Take 300 mg by mouth 3 times daily. , Disp: , Rfl:     ALPRAZolam (XANAX) 2 MG tablet, Take 2 mg by mouth 3 times daily. 1 by mouth three times a day, Disp: , Rfl:     zolpidem (AMBIEN) 10 MG tablet, Take by mouth nightly as needed for Sleep. 1 by mouth every night at bedtime as needed; sleep, Disp: , Rfl:     triamcinolone (KENALOG) 0.1 % cream, Apply topically 2 times daily. , Disp: 1 Tube, Rfl: 2    SUMAtriptan (IMITREX) 25 MG tablet, Take 1 tablet by mouth once as needed for Migraine, Disp: 9 tablet, Rfl: 5    QUEtiapine (SEROQUEL) 200 MG tablet, Take 1 tablet by mouth daily, Disp: 30 tablet, Rfl: 3    potassium chloride (KLOR-CON M) 10 MEQ extended release tablet, Take 1 tablet by mouth daily, Disp: 30 tablet, Rfl: 0    Allergies: Allergies   Allergen Reactions    Latex Itching and Rash    Morphine Hives    Penicillins Hives       Social History:     Social History     Tobacco Use    Smoking status: Never Smoker    Smokeless tobacco: Never Used   Substance Use Topics    Alcohol use: Not Currently    Drug use: Never       Patient lives at home.     Physical Exam:     Vitals:    08/31/21 1534 08/31/21 1536   BP: (!) 130/90 (!) 130/90   Pulse: 87    Temp: 97.9 °F (36.6 °C)    TempSrc: Temporal    SpO2: 97%    Weight: 230 lb (104.3 kg)        Exam:  Physical Exam  Vitals and nursing note reviewed. Constitutional:       General: She is not in acute distress. Appearance: She is well-developed. HENT:      Head: Normocephalic and atraumatic. Right Ear: Tympanic membrane normal.      Left Ear: Tympanic membrane normal.      Nose: Nose normal.      Mouth/Throat:      Mouth: Mucous membranes are moist.      Pharynx: Oropharynx is clear. Eyes:      Conjunctiva/sclera: Conjunctivae normal.      Pupils: Pupils are equal, round, and reactive to light. Cardiovascular:      Rate and Rhythm: Normal rate and regular rhythm. Pulmonary:      Effort: Pulmonary effort is normal. No respiratory distress. Breath sounds: Normal breath sounds. Abdominal:      General: Bowel sounds are normal.      Palpations: Abdomen is soft. Tenderness: There is no abdominal tenderness. Musculoskeletal:         General: Normal range of motion. Cervical back: Normal range of motion. No rigidity. Lymphadenopathy:      Cervical: No cervical adenopathy. Skin:     General: Skin is warm and dry. Neurological:      General: No focal deficit present. Mental Status: She is alert and oriented to person, place, and time. Psychiatric:         Mood and Affect: Mood normal.         Behavior: Behavior normal.         Thought Content: Thought content normal.         Judgment: Judgment normal.           Testing:           Medical Decision Making:       Patient upon arrival did not appear toxic or lethargic. Vital signs were reviewed. Past medical history reviewed. Allergies reviewed. Medications reviewed. Patient is presenting with the above complaint of cough and congestion. COVID-19 test is pending. Patient was educated on the likely viral etiology of illness.   She will manage her symptoms with over-the-counter analgesics and decongestants. She will follow up with her PCP as needed. She will return or go to the emergency department for worsening symptoms. Clinical Impression:   Elvin Shin was seen today for generalized body aches and headache. Diagnoses and all orders for this visit:    Cough  -     COVID-19 Ambulatory; Future    Exposure to COVID-19 virus        The patient is to call for any concerns or return if any of the signs or symptoms worsen. The patient is to follow-up with PCP in the next 2-3 days for repeat evaluation repeat assessment or go directly to the emergency department. SIGNATURE: Traci Rasmussen PA-C

## 2021-08-31 NOTE — TELEPHONE ENCOUNTER
We do not place over the phone orders for Covid testing. The patient will need to be evaluated in walk-in clinic.

## 2021-09-30 LAB
ALBUMIN SERPL-MCNC: 3.7 G/DL (ref 3.4–4.8)
ALP BLD-CCNC: 68 U/L (ref 42–121)
ALT SERPL-CCNC: 16 U/L (ref 10–54)
ANION GAP SERPL CALCULATED.3IONS-SCNC: 11 MEQ/L (ref 3–11)
AST SERPL-CCNC: 17 U/L (ref 10–41)
BASOPHILS ABSOLUTE: 0.1 K/UL (ref 0–0.2)
BASOPHILS RELATIVE PERCENT: 0.6 % (ref 0–1.5)
BILIRUB SERPL-MCNC: 0.6 MG/DL (ref 0.3–1.5)
BILIRUBIN DIRECT: 0.1 MG/DL (ref 0–0.5)
BILIRUBIN, INDIRECT: 0.5 MG/DL (ref 0–1)
BUN BLDV-MCNC: 6 MG/DL (ref 8–21)
CALCIUM SERPL-MCNC: 9.2 MG/DL (ref 8.5–10.5)
CHLORIDE BLD-SCNC: 104 MEQ/L (ref 98–107)
CO2: 24 MEQ/L (ref 21–31)
CREAT SERPL-MCNC: 0.6 MG/DL (ref 0.4–1)
CREATININE + EGFR PANEL: 131 ML/MIN
DIFFERENTIAL, MANUAL: NO
EOSINOPHILS ABSOLUTE: 0.3 K/UL (ref 0–0.33)
EOSINOPHILS RELATIVE PERCENT: 3.2 % (ref 0–3)
FERRITIN: 7.2 NG/ML (ref 11–307)
FOLATE: 8.8 NG/ML
GFR NON-AFRICAN AMERICAN: 108 ML/MIN
GLUCOSE BLD-MCNC: 99 MG/DL (ref 70–99)
HCT VFR BLD CALC: 31.3 % (ref 36–44)
HEMOGLOBIN: 10.2 G/DL (ref 12–15)
IRON SATURATION: 6 % (ref 15–55)
IRON: 28 UG/DL (ref 50–170)
LYMPHOCYTES ABSOLUTE: 1.8 K/UL (ref 1.1–4.8)
LYMPHOCYTES RELATIVE PERCENT: 20.5 % (ref 24–44)
MCH RBC QN AUTO: 23.9 PG (ref 28–34)
MCHC RBC AUTO-ENTMCNC: 32.7 G/DL (ref 33–37)
MCV RBC AUTO: 73.1 FL (ref 80–100)
MONOCYTES ABSOLUTE: 0.5 K/UL (ref 0.2–0.7)
MONOCYTES RELATIVE PERCENT: 5.4 % (ref 3.4–9)
NEUTROPHILS ABSOLUTE: 6.2 K/UL (ref 1.83–8.7)
PDW BLD-RTO: 17.4 % (ref 10.9–14.3)
PHOSPHORUS: 3 MG/DL (ref 2.5–4.6)
PLATELET # BLD: 500 K/UL (ref 150–450)
PMV BLD AUTO: 7.2 FL (ref 7.4–10.4)
POTASSIUM SERPL-SCNC: 3.8 MEQ/L (ref 3.6–5)
RBC # BLD: 4.27 M/UL (ref 4–4.9)
SEGMENTED NEUTROPHILS RELATIVE PERCENT: 70.3 % (ref 40–74)
SODIUM BLD-SCNC: 139 MEQ/L (ref 135–145)
T4 FREE: 1 NG/DL (ref 0.61–1.12)
TOTAL IRON BINDING CAPACITY: 455 UG/DL (ref 250–450)
TOTAL PROTEIN: 7.8 G/DL (ref 5.9–7.8)
TRANSFERRIN: 325 MG/DL (ref 192–382)
TSH SERPL DL<=0.05 MIU/L-ACNC: 1.76 UIU/ML (ref 0.34–5.6)
VITAMIN B-12: 254 PG/ML (ref 180–914)
WBC # BLD: 8.8 K/UL (ref 4.5–11)

## 2021-11-12 ENCOUNTER — TELEPHONE (OUTPATIENT)
Dept: FAMILY MEDICINE CLINIC | Age: 46
End: 2021-11-12

## 2021-11-12 DIAGNOSIS — G80.9 CEREBRAL PALSY, UNSPECIFIED TYPE (HCC): Primary | ICD-10-CM

## 2021-11-12 DIAGNOSIS — J98.01 BRONCHOSPASM: ICD-10-CM

## 2021-11-12 NOTE — TELEPHONE ENCOUNTER
----- Message from Barbie Campbell sent at 11/11/2021  2:08 PM EST -----  Subject: Message to Provider    QUESTIONS  Information for Provider? Jonatan Rousseau from [facility] called and asked she   have a order for a motorized wheel chair sent in and also an orde for a   new nebulizer   ---------------------------------------------------------------------------  --------------  [address]  What is the best way for the office to contact you? OK to leave message on   voicemail  Preferred Call Back Phone Number? [phone]  ---------------------------------------------------------------------------  --------------  SCRIPT ANSWERS  Relationship to Patient?  Third Party  Representative Name? Jonatan Rousseau

## 2021-11-12 NOTE — TELEPHONE ENCOUNTER
I placed an order for motorized wheelchair, the patient may need to have an in person visit for this to be approved by insurance. She also has no documentation of being on a nebulizer. I cannot prescribe this without a visit.

## 2021-12-06 ENCOUNTER — OFFICE VISIT (OUTPATIENT)
Dept: PRIMARY CARE CLINIC | Age: 46
End: 2021-12-06
Payer: COMMERCIAL

## 2021-12-06 DIAGNOSIS — J45.30 MILD PERSISTENT ASTHMA WITHOUT COMPLICATION: Primary | ICD-10-CM

## 2021-12-06 DIAGNOSIS — F41.9 ANXIETY: ICD-10-CM

## 2021-12-06 DIAGNOSIS — R60.9 PERIPHERAL EDEMA: ICD-10-CM

## 2021-12-06 DIAGNOSIS — J45.30 MILD PERSISTENT ASTHMA WITHOUT COMPLICATION: ICD-10-CM

## 2021-12-06 DIAGNOSIS — L03.115 CELLULITIS OF RIGHT LEG: ICD-10-CM

## 2021-12-06 DIAGNOSIS — G80.9 CEREBRAL PALSY, UNSPECIFIED TYPE (HCC): ICD-10-CM

## 2021-12-06 DIAGNOSIS — R11.0 NAUSEA: ICD-10-CM

## 2021-12-06 DIAGNOSIS — K21.9 GASTROESOPHAGEAL REFLUX DISEASE WITHOUT ESOPHAGITIS: ICD-10-CM

## 2021-12-06 LAB
ALBUMIN SERPL-MCNC: 4.5 G/DL (ref 3.5–5.2)
ALP BLD-CCNC: 88 U/L (ref 35–104)
ALT SERPL-CCNC: 14 U/L (ref 0–32)
ANION GAP SERPL CALCULATED.3IONS-SCNC: 17 MMOL/L (ref 7–16)
AST SERPL-CCNC: 17 U/L (ref 0–31)
BILIRUB SERPL-MCNC: 0.5 MG/DL (ref 0–1.2)
BUN BLDV-MCNC: 8 MG/DL (ref 6–20)
CALCIUM SERPL-MCNC: 9.3 MG/DL (ref 8.6–10.2)
CHLORIDE BLD-SCNC: 104 MMOL/L (ref 98–107)
CHOLESTEROL, TOTAL: 198 MG/DL (ref 0–199)
CO2: 21 MMOL/L (ref 22–29)
CREAT SERPL-MCNC: 0.7 MG/DL (ref 0.5–1)
GFR AFRICAN AMERICAN: >60
GFR NON-AFRICAN AMERICAN: >60 ML/MIN/1.73
GLUCOSE BLD-MCNC: 95 MG/DL (ref 74–99)
HCT VFR BLD CALC: 36.7 % (ref 34–48)
HDLC SERPL-MCNC: 49 MG/DL
HEMOGLOBIN: 11 G/DL (ref 11.5–15.5)
LDL CHOLESTEROL CALCULATED: 125 MG/DL (ref 0–99)
MCH RBC QN AUTO: 23.6 PG (ref 26–35)
MCHC RBC AUTO-ENTMCNC: 30 % (ref 32–34.5)
MCV RBC AUTO: 78.6 FL (ref 80–99.9)
PDW BLD-RTO: 15.7 FL (ref 11.5–15)
PLATELET # BLD: 496 E9/L (ref 130–450)
PMV BLD AUTO: 9.5 FL (ref 7–12)
POTASSIUM SERPL-SCNC: 3.6 MMOL/L (ref 3.5–5)
RBC # BLD: 4.67 E12/L (ref 3.5–5.5)
SODIUM BLD-SCNC: 142 MMOL/L (ref 132–146)
TOTAL PROTEIN: 7.9 G/DL (ref 6.4–8.3)
TRIGL SERPL-MCNC: 122 MG/DL (ref 0–149)
TSH SERPL DL<=0.05 MIU/L-ACNC: 2.05 UIU/ML (ref 0.27–4.2)
VITAMIN D 25-HYDROXY: 24 NG/ML (ref 30–100)
VLDLC SERPL CALC-MCNC: 24 MG/DL
WBC # BLD: 9.3 E9/L (ref 4.5–11.5)

## 2021-12-06 PROCEDURE — G8417 CALC BMI ABV UP PARAM F/U: HCPCS | Performed by: NURSE PRACTITIONER

## 2021-12-06 PROCEDURE — G8484 FLU IMMUNIZE NO ADMIN: HCPCS | Performed by: NURSE PRACTITIONER

## 2021-12-06 PROCEDURE — G8427 DOCREV CUR MEDS BY ELIG CLIN: HCPCS | Performed by: NURSE PRACTITIONER

## 2021-12-06 PROCEDURE — 99214 OFFICE O/P EST MOD 30 MIN: CPT | Performed by: NURSE PRACTITIONER

## 2021-12-06 PROCEDURE — 1036F TOBACCO NON-USER: CPT | Performed by: NURSE PRACTITIONER

## 2021-12-06 RX ORDER — MISOPROSTOL 100 UG/1
TABLET ORAL
COMMUNITY
Start: 2021-09-30

## 2021-12-06 RX ORDER — HYDROCHLOROTHIAZIDE 25 MG/1
25 TABLET ORAL EVERY MORNING
Qty: 30 TABLET | Refills: 3 | Status: SHIPPED | OUTPATIENT
Start: 2021-12-06

## 2021-12-06 RX ORDER — ONDANSETRON 4 MG/1
TABLET, FILM COATED ORAL
COMMUNITY
Start: 2021-09-30

## 2021-12-06 RX ORDER — CEPHALEXIN 500 MG/1
500 CAPSULE ORAL 2 TIMES DAILY
Qty: 14 CAPSULE | Refills: 0 | Status: SHIPPED | OUTPATIENT
Start: 2021-12-06 | End: 2021-12-13

## 2021-12-06 RX ORDER — IPRATROPIUM BROMIDE AND ALBUTEROL SULFATE 2.5; .5 MG/3ML; MG/3ML
1 SOLUTION RESPIRATORY (INHALATION) EVERY 4 HOURS PRN
Qty: 360 ML | Refills: 2 | Status: SHIPPED | OUTPATIENT
Start: 2021-12-06

## 2021-12-06 RX ORDER — FLUTICASONE PROPIONATE 44 UG/1
2 AEROSOL, METERED RESPIRATORY (INHALATION) 2 TIMES DAILY
Qty: 10.6 G | Refills: 3 | Status: SHIPPED | OUTPATIENT
Start: 2021-12-06

## 2021-12-06 SDOH — ECONOMIC STABILITY: FOOD INSECURITY: WITHIN THE PAST 12 MONTHS, YOU WORRIED THAT YOUR FOOD WOULD RUN OUT BEFORE YOU GOT MONEY TO BUY MORE.: NEVER TRUE

## 2021-12-06 SDOH — ECONOMIC STABILITY: FOOD INSECURITY: WITHIN THE PAST 12 MONTHS, THE FOOD YOU BOUGHT JUST DIDN'T LAST AND YOU DIDN'T HAVE MONEY TO GET MORE.: NEVER TRUE

## 2021-12-06 ASSESSMENT — SOCIAL DETERMINANTS OF HEALTH (SDOH): HOW HARD IS IT FOR YOU TO PAY FOR THE VERY BASICS LIKE FOOD, HOUSING, MEDICAL CARE, AND HEATING?: NOT HARD AT ALL

## 2021-12-06 NOTE — PROGRESS NOTES
2021     [facility] Jordana 39 y.o. female    : 1975    Chief Complaint:   Other (patient is here for a nebulizer and a wheelchair exam. she is unable to do the sleep study at this time )       History of Present Illness:   Marina Torres is a 39 y.o. female who presents to the office for follow-up on her chronic problems. She has a pertinent past medical history of cerebral palsy and is wheelchair bound due to bilateral lower extremity weakness. Peripheral edema-minimally improved. At last appointment, I started the patient on furosemide 20 mg daily with potassium supplementations. She did decrease this dose to every other day due to muscle cramping. She has trialed compression stockings in the past but does not tolerate them. She tries to elevate her lower extremities as much as possible. She does report over the past few days she has noticed erythema to her right lower extremity with weeping. Denies any fever, chills, chest pain, shortness of breath or lethargy. Anxiety-controlled. She does participate in group counseling. She does not follow with psychiatry. Migraines- controlled  Asthma-poorly controlled. Patient reports she has been using her albuterol inhaler on a regular basis. She does attempt to avoid triggers. She is not on any maintenance medication. She has not had spirometry testing completed. She previously was ordered a sleep study, patient is unable to complete this test.  GERD-controlled. The patient is not up-to-date with health maintenance screenings. Previous lab work was reviewed. Past Medical History:   History reviewed. No pertinent past medical history. History reviewed. No pertinent surgical history. History reviewed. No pertinent family history.     Social History     Tobacco Use    Smoking status: Never Smoker    Smokeless tobacco: Never Used   Substance Use Topics    Alcohol use: Not Currently    Drug use: Never       Medications:     Current MG tablet, Take by mouth nightly as needed for Sleep. 1 by mouth every night at bedtime as needed; sleep, Disp: , Rfl:     SUMAtriptan (IMITREX) 25 MG tablet, Take 1 tablet by mouth once as needed for Migraine, Disp: 9 tablet, Rfl: 5    QUEtiapine (SEROQUEL) 200 MG tablet, Take 1 tablet by mouth daily, Disp: 30 tablet, Rfl: 3    potassium chloride (KLOR-CON M) 10 MEQ extended release tablet, Take 1 tablet by mouth daily, Disp: 30 tablet, Rfl: 0    triamcinolone (KENALOG) 0.1 % cream, Apply topically 2 times daily. , Disp: 1 Tube, Rfl: 2    Allergies   Allergen Reactions    Latex Itching and Rash     Other reaction(s): Hives    Morphine Hives    Penicillin V      Other reaction(s): Hives    Penicillins Hives       Review of Systems:   Review of Systems   Constitutional: Negative for activity change, appetite change, fatigue, fever and unexpected weight change. HENT: Negative for congestion, facial swelling, mouth sores, postnasal drip, rhinorrhea, sneezing, tinnitus and voice change. Eyes: Negative. Respiratory: Negative for apnea, cough, chest tightness, shortness of breath and wheezing. Cardiovascular: Positive for leg swelling. Negative for chest pain and palpitations. Gastrointestinal: Negative for abdominal distention, abdominal pain, constipation, diarrhea, nausea and vomiting. Endocrine: Negative for cold intolerance and heat intolerance. Genitourinary: Negative for difficulty urinating, dysuria, flank pain, frequency, pelvic pain and urgency. Musculoskeletal: Negative for back pain, gait problem, joint swelling, myalgias and neck pain. Skin: Negative for color change, pallor, rash and wound. Allergic/Immunologic: Negative for environmental allergies and food allergies. Neurological: Positive for weakness. Negative for dizziness, tremors, seizures, syncope, facial asymmetry, speech difficulty, light-headedness, numbness and headaches.    Psychiatric/Behavioral: Negative for agitation, behavioral problems, confusion, decreased concentration, dysphoric mood, sleep disturbance and suicidal ideas. The patient is not hyperactive. Physical Exam:   Vital Signs: There were no vitals taken for this visit. Oxygen Saturation Interpretation: N/A. Physical Exam  Vitals and nursing note reviewed. Constitutional:       Appearance: Normal appearance. She is normal weight. HENT:      Head: Normocephalic and atraumatic. Right Ear: Tympanic membrane, ear canal and external ear normal.      Left Ear: Tympanic membrane, ear canal and external ear normal.      Nose: Nose normal.      Mouth/Throat:      Mouth: Mucous membranes are moist.      Pharynx: Oropharynx is clear. Eyes:      Extraocular Movements: Extraocular movements intact. Conjunctiva/sclera: Conjunctivae normal.      Pupils: Pupils are equal, round, and reactive to light. Cardiovascular:      Rate and Rhythm: Normal rate and regular rhythm. Pulses: Normal pulses. Heart sounds: Normal heart sounds. Pulmonary:      Effort: Pulmonary effort is normal.      Breath sounds: Normal breath sounds. No wheezing, rhonchi or rales. Abdominal:      General: Bowel sounds are normal. There is no distension. Palpations: Abdomen is soft. Tenderness: There is no abdominal tenderness. There is no rebound. Musculoskeletal:         General: Normal range of motion. Cervical back: Normal range of motion and neck supple. Right lower leg: 3+ Edema present. Left lower leg: 3+ Edema present. Skin:     General: Skin is warm and dry. Capillary Refill: Capillary refill takes less than 2 seconds. Findings: Erythema present. No rash. Comments: To RLE. No lymphangitic streaking present. Neurological:      General: No focal deficit present. Mental Status: She is alert and oriented to person, place, and time.    Psychiatric:         Mood and Affect: Mood normal.         Behavior: Behavior normal.         Thought Content: Thought content normal.         Judgment: Judgment normal.         Health Maintenance:     Health Maintenance   Topic Date Due    Hepatitis C screen  Never done    COVID-19 Vaccine (1) Never done    DTaP/Tdap/Td vaccine (1 - Tdap) Never done    Cervical cancer screen  Never done    Colon cancer screen colonoscopy  Never done    Flu vaccine (1) Never done    Potassium monitoring  12/06/2022    Creatinine monitoring  12/06/2022    Lipid screen  12/06/2026    HIV screen  Completed    Hepatitis A vaccine  Aged Out    Hepatitis B vaccine  Aged Out    Hib vaccine  Aged Out    Meningococcal (ACWY) vaccine  Aged Out    Pneumococcal 0-64 years Vaccine  Aged Out          There is no immunization history on file for this patient. Testing: All laboratory and radiology results have been personally reviewed by myself. Labs:  No results found for this visit on 12/06/21. Imaging: All Radiology results interpreted by Radiologist unless otherwise noted. No results found. Assessment/Plan:   I personally reviewed the patient's allergies, past medical history, medications, and vitals sign. Robe Barth was seen today for other. Diagnoses and all orders for this visit:    Mild persistent asthma without complication  -     fluticasone (FLOVENT HFA) 44 MCG/ACT inhaler; Inhale 2 puffs into the lungs 2 times daily  -     DME Order for Nebulizer as OP  -     ipratropium-albuterol (DUONEB) 0.5-2.5 (3) MG/3ML SOLN nebulizer solution; Inhale 3 mLs into the lungs every 4 hours as needed for Shortness of Breath  -     CBC; Future  -     Comprehensive Metabolic Panel; Future  -     Lipid Panel; Future  -     Vitamin D 25 Hydroxy; Future  -     TSH without Reflex; Future    Cerebral palsy, unspecified type ([facility] [address]  -     DME Order for (Specify) as OP  -     CBC; Future  -     Comprehensive Metabolic Panel; Future  -     Lipid Panel; Future  -     Vitamin D 25 Hydroxy;  Future  - TSH without Reflex; Future    Anxiety    Peripheral edema  -     hydroCHLOROthiazide (HYDRODIURIL) 25 MG tablet; Take 1 tablet by mouth every morning    Gastroesophageal reflux disease without esophagitis    Nausea    Cellulitis of right leg  -     cephALEXin (KEFLEX) 500 MG capsule; Take 1 capsule by mouth 2 times daily for 7 days        We will place the patient on Flovent for better asthma control. DME order written for nebulizer, side effects and administration instructions discussed. Patient will be started on cephalexin for cellulitis of right lower extremity. Red flag symptoms were discussed, if any of these occur she should go to the emergency department for further evaluation and work-up. She will obtain fasting lab work, will call with results. DME order written for electric wheelchair for history of cerebral palsy, will be faxed to [facility]. Continue all other medications as prescribed. Call or go to ED immediately if symptoms worsen or persist.  Return in about 3 months (around 3/6/2022) for 3 mth f/u. Sooner if necessary. Counseled regarding above diagnosis, including possible risks and complications,especially if left uncontrolled. Counseled regarding the possible side effects, risks, benefits and alternatives to treatment; patient and/or guardian verbalizes understanding. Advised patient to call with any new medication issues. All questions answered. Reviewed age and gender appropriate health screening exams and vaccinations. Advised patient regarding importance of keeping up with recommended health maintenance and to schedule as soon as possible if overdue, as this is important in assessing for undiagnosed pathology, especially cancer. Patient verbalizes understanding and agrees. KIKI Armendariz NP     **This report was transcribed using voice recognition software.  Every effort was made to ensure accuracy; however, inadvertent computerized transcription errors may be

## 2021-12-07 DIAGNOSIS — D64.9 ANEMIA, UNSPECIFIED TYPE: Primary | ICD-10-CM

## 2021-12-07 RX ORDER — MELATONIN
1000 DAILY
Qty: 30 TABLET | Refills: 3 | Status: SHIPPED | OUTPATIENT
Start: 2021-12-07

## 2021-12-07 ASSESSMENT — ENCOUNTER SYMPTOMS
VOICE CHANGE: 0
NAUSEA: 0
FACIAL SWELLING: 0
EYES NEGATIVE: 1
BACK PAIN: 0
CONSTIPATION: 0
APNEA: 0
ABDOMINAL DISTENTION: 0
CHEST TIGHTNESS: 0
COUGH: 0
RHINORRHEA: 0
COLOR CHANGE: 0
ABDOMINAL PAIN: 0
SHORTNESS OF BREATH: 0
WHEEZING: 0
DIARRHEA: 0
VOMITING: 0

## 2021-12-08 LAB
IRON SATURATION: 7 % (ref 15–50)
IRON: 30 MCG/DL (ref 37–145)
TOTAL IRON BINDING CAPACITY: 432 MCG/DL (ref 250–450)
VITAMIN B-12: 349 PG/ML (ref 211–946)

## 2021-12-17 ENCOUNTER — TELEPHONE (OUTPATIENT)
Dept: FAMILY MEDICINE CLINIC | Age: 46
End: 2021-12-17

## 2021-12-17 RX ORDER — DOXYCYCLINE HYCLATE 100 MG/1
100 CAPSULE ORAL 2 TIMES DAILY
Qty: 20 CAPSULE | Refills: 0 | Status: SHIPPED | OUTPATIENT
Start: 2021-12-17 | End: 2021-12-27

## 2021-12-17 NOTE — TELEPHONE ENCOUNTER
I reviewed the office visit notes, I have changed the antibiotic and sent it to the pharmacy. Unfortunately, in terms of iron supplementation, oral pills or IV infusions are going to be the options we have to work with. Did she and Luma Martinez discuss what they would like to do?

## 2021-12-17 NOTE — TELEPHONE ENCOUNTER
In regards to Tatiana's labs from 12/07:  She said that she is anemic and has had to have iron infusions in the past bc she can't take the pills bc they make her sick. And she stopped doing the iron infusions bc she has IBS and they take an hour to do and she was afraid she wouldn't be able to sit that long and make it home. Also, she said she had an antibiotic phoned in and the pharmacist told her that with her allergy to PCN, she should not take it even though Kacey Newsome told her she should be able to. So she did not take it and wanted something else called in.

## 2021-12-28 ENCOUNTER — OFFICE VISIT (OUTPATIENT)
Dept: FAMILY MEDICINE CLINIC | Age: 46
End: 2021-12-28
Payer: COMMERCIAL

## 2021-12-28 VITALS
TEMPERATURE: 98.2 F | WEIGHT: 230 LBS | BODY MASS INDEX: 34.86 KG/M2 | OXYGEN SATURATION: 97 % | RESPIRATION RATE: 18 BRPM | DIASTOLIC BLOOD PRESSURE: 100 MMHG | HEIGHT: 68 IN | SYSTOLIC BLOOD PRESSURE: 140 MMHG | HEART RATE: 80 BPM

## 2021-12-28 DIAGNOSIS — J06.9 UPPER RESPIRATORY TRACT INFECTION, UNSPECIFIED TYPE: ICD-10-CM

## 2021-12-28 DIAGNOSIS — R09.81 NASAL CONGESTION: Primary | ICD-10-CM

## 2021-12-28 PROCEDURE — 1036F TOBACCO NON-USER: CPT | Performed by: PHYSICIAN ASSISTANT

## 2021-12-28 PROCEDURE — 99213 OFFICE O/P EST LOW 20 MIN: CPT | Performed by: PHYSICIAN ASSISTANT

## 2021-12-28 PROCEDURE — G8427 DOCREV CUR MEDS BY ELIG CLIN: HCPCS | Performed by: PHYSICIAN ASSISTANT

## 2021-12-28 PROCEDURE — G8417 CALC BMI ABV UP PARAM F/U: HCPCS | Performed by: PHYSICIAN ASSISTANT

## 2021-12-28 PROCEDURE — G8484 FLU IMMUNIZE NO ADMIN: HCPCS | Performed by: PHYSICIAN ASSISTANT

## 2021-12-28 RX ORDER — METHYLPREDNISOLONE 4 MG/1
TABLET ORAL
Qty: 1 KIT | Refills: 0 | Status: SHIPPED | OUTPATIENT
Start: 2021-12-28

## 2021-12-28 RX ORDER — DOXYCYCLINE HYCLATE 100 MG
100 TABLET ORAL 2 TIMES DAILY
Qty: 20 TABLET | Refills: 0 | Status: SHIPPED | OUTPATIENT
Start: 2021-12-28 | End: 2022-01-07

## 2021-12-28 RX ORDER — FLUTICASONE PROPIONATE 50 MCG
2 SPRAY, SUSPENSION (ML) NASAL DAILY
Qty: 16 G | Refills: 0 | Status: SHIPPED | OUTPATIENT
Start: 2021-12-28

## 2021-12-28 ASSESSMENT — ENCOUNTER SYMPTOMS
COUGH: 1
VOMITING: 0
SHORTNESS OF BREATH: 0
DIARRHEA: 0
NAUSEA: 1
SORE THROAT: 0
ABDOMINAL PAIN: 0
PHOTOPHOBIA: 0
BACK PAIN: 0

## 2021-12-28 NOTE — PROGRESS NOTES
21  Tatiana Silveira : 1975 Sex: female  Age 39 y.o. Subjective:  Chief Complaint   Patient presents with    Cough     All started 1 week ago.  Fever    Headache    Nausea    Chills         70-year-old female presents to the walk-in clinic for evaluation of cough, congestion and sore throat for the last 1 week. She also has fever, chills and nausea. The whole entire house is sick with similar symptoms and they have all tested negative for COVID-19. Patient states she has had COVID-19 in the past.  She is not vaccinated for COVID-19. No shortness of breath or chest pain. No hemoptysis. No lightheadedness or dizziness. She is eating and drinking. Review of Systems   Constitutional: Positive for chills and fever. HENT: Positive for congestion. Negative for ear pain and sore throat. Eyes: Negative for photophobia and visual disturbance. Respiratory: Positive for cough. Negative for shortness of breath. Cardiovascular: Negative for chest pain. Gastrointestinal: Positive for nausea. Negative for abdominal pain, diarrhea and vomiting. Genitourinary: Negative for difficulty urinating, dysuria, frequency and urgency. Musculoskeletal: Negative for back pain, neck pain and neck stiffness. Skin: Negative for rash. Neurological: Negative for dizziness, syncope, weakness, light-headedness and headaches. Hematological: Negative for adenopathy. Does not bruise/bleed easily. Psychiatric/Behavioral: Negative for agitation and confusion. All other systems reviewed and are negative. PMH:   History reviewed. No pertinent past medical history. History reviewed. No pertinent surgical history. History reviewed. No pertinent family history.     Medications:     Current Outpatient Medications:     doxycycline hyclate (VIBRA-TABS) 100 MG tablet, Take 1 tablet by mouth 2 times daily for 10 days, Disp: 20 tablet, Rfl: 0    methylPREDNISolone (MEDROL DOSEPACK) 4 MG tablet, Take by mouth., Disp: 1 kit, Rfl: 0    fluticasone (FLONASE) 50 MCG/ACT nasal spray, 2 sprays by Each Nostril route daily, Disp: 16 g, Rfl: 0    vitamin D3 (CHOLECALCIFEROL) 25 MCG (1000 UT) TABS tablet, Take 1 tablet by mouth daily, Disp: 30 tablet, Rfl: 3    ondansetron (ZOFRAN) 4 MG tablet, Take by mouth, Disp: , Rfl:     miSOPROStol (CYTOTEC) 100 MCG tablet, , Disp: , Rfl:     fluticasone (FLOVENT HFA) 44 MCG/ACT inhaler, Inhale 2 puffs into the lungs 2 times daily, Disp: 10.6 g, Rfl: 3    hydroCHLOROthiazide (HYDRODIURIL) 25 MG tablet, Take 1 tablet by mouth every morning, Disp: 30 tablet, Rfl: 3    DEXILANT 60 MG CPDR delayed release capsule, Take 1 capsule by mouth daily, Disp: 30 capsule, Rfl: 5    pantoprazole (PROTONIX) 40 MG tablet, Take 1 tablet by mouth daily, Disp: 30 tablet, Rfl: 5    sucralfate (CARAFATE) 1 GM tablet, Take 1 tablet by mouth 3 times daily, Disp: 120 tablet, Rfl: 5    SUMAtriptan (IMITREX) 25 MG tablet, Take 1 tablet by mouth once as needed for Migraine, Disp: 9 tablet, Rfl: 5    furosemide (LASIX) 20 MG tablet, Take 1 tablet by mouth daily, Disp: 30 tablet, Rfl: 3    QUEtiapine (SEROQUEL) 200 MG tablet, Take 1 tablet by mouth daily, Disp: 30 tablet, Rfl: 3    potassium chloride (KLOR-CON M) 10 MEQ extended release tablet, Take 1 tablet by mouth daily, Disp: 30 tablet, Rfl: 0    albuterol sulfate  (90 Base) MCG/ACT inhaler, Inhale 2 puffs into the lungs every 6 hours as needed for Wheezing, Disp: 1 Inhaler, Rfl: 2    ketotifen (ALAWAY) 0.025 % ophthalmic solution, Place 1 drop into both eyes 2 times daily, Disp: 1 Bottle, Rfl: 3    topiramate (TOPAMAX) 25 MG tablet, Take 1 tablet by mouth daily, Disp: 30 tablet, Rfl: 3    fluticasone (FLONASE) 50 MCG/ACT nasal spray, 2 sprays by Each Nostril route daily 2 sprays each nostril daily, Disp: 1 Bottle, Rfl: 5    gabapentin (NEURONTIN) 300 MG capsule, Take 300 mg by mouth 3 times daily. , Disp: , Rfl:    ALPRAZolam (XANAX) 2 MG tablet, Take 2 mg by mouth 3 times daily. 1 by mouth three times a day, Disp: , Rfl:     zolpidem (AMBIEN) 10 MG tablet, Take by mouth nightly as needed for Sleep. 1 by mouth every night at bedtime as needed; sleep, Disp: , Rfl:     triamcinolone (KENALOG) 0.1 % cream, Apply topically 2 times daily. , Disp: 1 Tube, Rfl: 2    ipratropium-albuterol (DUONEB) 0.5-2.5 (3) MG/3ML SOLN nebulizer solution, Inhale 3 mLs into the lungs every 4 hours as needed for Shortness of Breath (Patient not taking: Reported on 12/28/2021), Disp: 360 mL, Rfl: 2    Handicap Placard MISC, by Does not apply route Patient cannot walk 200 ft without stopping to rest.   Expiration 09/02/2025., Disp: 1 each, Rfl: 0    Allergies: Allergies   Allergen Reactions    Latex Itching and Rash     Other reaction(s): Hives    Morphine Hives    Penicillin V      Other reaction(s): Hives    Penicillins Hives       Social History:     Social History     Tobacco Use    Smoking status: Never Smoker    Smokeless tobacco: Never Used   Substance Use Topics    Alcohol use: Not Currently    Drug use: Never       Patient lives at home. Physical Exam:     Vitals:    12/28/21 1028   BP: (!) 140/100   Pulse: 80   Resp: 18   Temp: 98.2 °F (36.8 °C)   TempSrc: Temporal   SpO2: 97%   Weight: 230 lb (104.3 kg)   Height: 5' 8\" (1.727 m)       Exam:  Physical Exam  Vitals and nursing note reviewed. Constitutional:       General: She is not in acute distress. Appearance: She is well-developed. HENT:      Head: Normocephalic and atraumatic. Right Ear: Tympanic membrane normal.      Left Ear: Tympanic membrane normal.      Nose: Nose normal.      Mouth/Throat:      Mouth: Mucous membranes are moist.   Eyes:      Conjunctiva/sclera: Conjunctivae normal.      Pupils: Pupils are equal, round, and reactive to light. Cardiovascular:      Rate and Rhythm: Normal rate and regular rhythm.    Pulmonary:      Effort: Pulmonary effort is normal. No respiratory distress. Breath sounds: Normal breath sounds. Abdominal:      General: Bowel sounds are normal.      Palpations: Abdomen is soft. Tenderness: There is no abdominal tenderness. Musculoskeletal:         General: Normal range of motion. Cervical back: Normal range of motion. No rigidity. Lymphadenopathy:      Cervical: No cervical adenopathy. Skin:     General: Skin is warm and dry. Neurological:      General: No focal deficit present. Mental Status: She is alert and oriented to person, place, and time. Psychiatric:         Mood and Affect: Mood normal.         Behavior: Behavior normal.         Thought Content: Thought content normal.         Judgment: Judgment normal.           Testing:           Medical Decision Making:     Patient upon arrival did not appear toxic or lethargic. Vital signs were reviewed. Past medical history reviewed. Allergies reviewed. Medications reviewed. Patient is presenting with the above complaint of cough and congestion. COVID-19 testing is deferred. Differential diagnosis was discussed with the patient. Patient will be given a prescription for doxycycline, Flonase and Medrol Dosepak. Patient may use over-the-counter analgesics and decongestants as needed. Patient is continue to monitor symptoms and follow-up with her primary care provider in 3-5 days if no improvement. She will return or go to the emergency department for any worsening symptoms. Patient understands the plan is agreeable. Clinical Impression:   Sindi Dominguez was seen today for cough, fever, headache, nausea and chills. Diagnoses and all orders for this visit:    Nasal congestion  -     Cancel: COVID-19 Ambulatory; Future    Upper respiratory tract infection, unspecified type    Other orders  -     doxycycline hyclate (VIBRA-TABS) 100 MG tablet;  Take 1 tablet by mouth 2 times daily for 10 days  -     methylPREDNISolone (MEDROL

## 2022-01-26 NOTE — TELEPHONE ENCOUNTER
Patient requesting medication that we do not have record of prescribing. I called the number in the chart and was given a different number by the person who answered the phone. [phone]. I called that number and had to leave a message because there was no answer.

## 2022-01-27 NOTE — TELEPHONE ENCOUNTER
I tried to review her OARRS and I'm unable to pull anything up. She will need to call back to clarify.

## 2022-01-31 NOTE — TELEPHONE ENCOUNTER
It does not look like I prescribed this medication. She would need to establish with psychiatry as discussed for medication.

## 2022-10-10 ENCOUNTER — OFFICE VISIT (OUTPATIENT)
Dept: FAMILY MEDICINE CLINIC | Age: 47
End: 2022-10-10
Payer: COMMERCIAL

## 2022-10-10 VITALS
BODY MASS INDEX: 34.07 KG/M2 | WEIGHT: 230 LBS | DIASTOLIC BLOOD PRESSURE: 70 MMHG | RESPIRATION RATE: 20 BRPM | OXYGEN SATURATION: 97 % | HEIGHT: 69 IN | HEART RATE: 68 BPM | TEMPERATURE: 97.9 F | SYSTOLIC BLOOD PRESSURE: 130 MMHG

## 2022-10-10 DIAGNOSIS — J01.00 ACUTE NON-RECURRENT MAXILLARY SINUSITIS: Primary | ICD-10-CM

## 2022-10-10 PROCEDURE — G8484 FLU IMMUNIZE NO ADMIN: HCPCS | Performed by: NURSE PRACTITIONER

## 2022-10-10 PROCEDURE — 99213 OFFICE O/P EST LOW 20 MIN: CPT | Performed by: NURSE PRACTITIONER

## 2022-10-10 PROCEDURE — G8417 CALC BMI ABV UP PARAM F/U: HCPCS | Performed by: NURSE PRACTITIONER

## 2022-10-10 PROCEDURE — 1036F TOBACCO NON-USER: CPT | Performed by: NURSE PRACTITIONER

## 2022-10-10 PROCEDURE — G8427 DOCREV CUR MEDS BY ELIG CLIN: HCPCS | Performed by: NURSE PRACTITIONER

## 2022-10-10 RX ORDER — TOPIRAMATE 25 MG/1
25 CAPSULE, COATED PELLETS ORAL 2 TIMES DAILY
COMMUNITY

## 2022-10-10 RX ORDER — ALPRAZOLAM 1 MG/1
2 TABLET ORAL NIGHTLY PRN
COMMUNITY

## 2022-10-10 RX ORDER — SUMATRIPTAN 25 MG/1
25 TABLET, FILM COATED ORAL
COMMUNITY

## 2022-10-10 RX ORDER — PANTOPRAZOLE SODIUM 40 MG/1
40 TABLET, DELAYED RELEASE ORAL DAILY
COMMUNITY

## 2022-10-10 RX ORDER — ONDANSETRON 4 MG/1
4 TABLET, FILM COATED ORAL EVERY 8 HOURS PRN
COMMUNITY

## 2022-10-10 RX ORDER — SUCRALFATE 1 G/1
1 TABLET ORAL 4 TIMES DAILY
COMMUNITY

## 2022-10-10 RX ORDER — GABAPENTIN 300 MG/1
300 CAPSULE ORAL 3 TIMES DAILY
COMMUNITY

## 2022-10-10 RX ORDER — POTASSIUM CHLORIDE 750 MG/1
10 CAPSULE, EXTENDED RELEASE ORAL 2 TIMES DAILY
COMMUNITY

## 2022-10-10 RX ORDER — FOLIC ACID 1 MG/1
1 TABLET ORAL DAILY
COMMUNITY

## 2022-10-10 RX ORDER — FUROSEMIDE 20 MG/1
20 TABLET ORAL 2 TIMES DAILY
COMMUNITY

## 2022-10-10 RX ORDER — DOXYCYCLINE HYCLATE 100 MG
100 TABLET ORAL 2 TIMES DAILY
Qty: 20 TABLET | Refills: 0 | Status: SHIPPED | OUTPATIENT
Start: 2022-10-10 | End: 2022-10-20

## 2022-10-10 RX ORDER — FLUTICASONE PROPIONATE 44 UG/1
1 AEROSOL, METERED RESPIRATORY (INHALATION) 2 TIMES DAILY
COMMUNITY

## 2022-10-10 RX ORDER — MISOPROSTOL 100 UG/1
100 TABLET ORAL 3 TIMES DAILY
COMMUNITY

## 2022-10-10 RX ORDER — ZOLPIDEM TARTRATE 10 MG/1
TABLET ORAL NIGHTLY PRN
COMMUNITY

## 2022-10-10 RX ORDER — QUETIAPINE FUMARATE 200 MG/1
200 TABLET, FILM COATED ORAL 2 TIMES DAILY
COMMUNITY

## 2022-10-10 RX ORDER — CHOLECALCIFEROL (VITAMIN D3) 125 MCG
500 CAPSULE ORAL DAILY
COMMUNITY

## 2022-10-10 RX ORDER — ALBUTEROL SULFATE 90 UG/1
2 AEROSOL, METERED RESPIRATORY (INHALATION) EVERY 6 HOURS PRN
COMMUNITY

## 2022-10-10 NOTE — PROGRESS NOTES
Subjective:  Chief Complaint   Patient presents with    Congestion       HPI:  The patient states that they have had a cough, runny nose and nasal congestion for the last 7 days. Cough is productive of sputum. The patient also reports mild sore throat without pain with swallowing/difficulty swallowing. Reports fever of 101. Patient denies CP or dyspnea. No vomiting or diarrhea. COVID test this morning was negative. Patient has been taking OTC medications without improvement. She does have a history of \"touch \"of COPD and uses albuterol as needed, it has been a few days since she has used this. The patient presents for evaluation. ROS:  Positive and pertinent negatives as per HPI. All other systems are reviewed and negative. Current Outpatient Medications:     ALPRAZolam (XANAX) 1 MG tablet, Take 2 mg by mouth nightly as needed for Sleep., Disp: , Rfl:     sucralfate (CARAFATE) 1 GM tablet, Take 1 g by mouth 4 times daily, Disp: , Rfl:     potassium chloride (MICRO-K) 10 MEQ extended release capsule, Take 10 mEq by mouth 2 times daily, Disp: , Rfl:     SUMAtriptan (IMITREX) 25 MG tablet, Take 25 mg by mouth once as needed for Migraine, Disp: , Rfl:     QUEtiapine (SEROQUEL) 200 MG tablet, Take 200 mg by mouth 2 times daily, Disp: , Rfl:     furosemide (LASIX) 20 MG tablet, Take 20 mg by mouth 2 times daily, Disp: , Rfl:     zolpidem (AMBIEN) 10 MG tablet, Take by mouth nightly as needed for Sleep., Disp: , Rfl:     topiramate (TOPAMAX SPRINKLE) 25 MG capsule, Take 25 mg by mouth 2 times daily, Disp: , Rfl:     albuterol sulfate HFA (VENTOLIN HFA) 108 (90 Base) MCG/ACT inhaler, Inhale 2 puffs into the lungs every 6 hours as needed for Wheezing, Disp: , Rfl:     pantoprazole (PROTONIX) 40 MG tablet, Take 40 mg by mouth daily, Disp: , Rfl:     gabapentin (NEURONTIN) 300 MG capsule, Take 300 mg by mouth 3 times daily. , Disp: , Rfl:     ondansetron (ZOFRAN) 4 MG tablet, Take 4 mg by mouth every 8 hours as needed for Nausea or Vomiting, Disp: , Rfl:     miSOPROStol (CYTOTEC) 100 MCG tablet, Take 100 mcg by mouth 3 times daily, Disp: , Rfl:     fluticasone (VERAMYST) 27.5 MCG/SPRAY nasal spray, 2 sprays by Each Nostril route daily, Disp: , Rfl:     fluticasone (FLOVENT HFA) 44 MCG/ACT inhaler, Inhale 1 puff into the lungs 2 times daily, Disp: , Rfl:     folic acid (FOLVITE) 1 MG tablet, Take 1 mg by mouth daily, Disp: , Rfl:     vitamin B-12 (CYANOCOBALAMIN) 500 MCG tablet, Take 500 mcg by mouth daily, Disp: , Rfl:     doxycycline hyclate (VIBRA-TABS) 100 MG tablet, Take 1 tablet by mouth 2 times daily for 10 days, Disp: 20 tablet, Rfl: 0   Allergies   Allergen Reactions    Morphine     Pcn [Penicillins]         Objective:  Vitals:    10/10/22 1003   BP: 130/70   Pulse: 68   Resp: 20   Temp: 97.9 °F (36.6 °C)   TempSrc: Temporal   SpO2: 97%   Weight: 230 lb (104.3 kg)   Height: 5' 9\" (1.753 m)        Exam:  Const: Appears healthy and well developed. No signs of acute distress present. Vitals reviewed per triage. Head/Face: Normocephalic, atraumatic. Facies is symmetric. Eyes: PERRL. ENMT: Tympanic membranes are pearly gray with good light reflex bilaterally. Nares are patent with clear rhinorrhea. Buccal mucosa is moist. Mild erythema in the posterior pharynx without edema of oropharynx or petechiae of palate. Tenderness located over the bilateral maxillary sinuses. Neck: Supple and symmetric. Palpation reveals no adenopathy. No meningeal signs. Trachea midline. Resp: Lungs are clear to auscultation bilaterally without wheezes, rhonchi, or crackles. Chest expansion was symmetrical without accessory muscle use noted. Cough is present. CV: S1 is normal. S2 is normal.  Musculo: Patient in wheelchair. Skin: Skin is warm and dry. Neuro: Alert and oriented x3. Speech is articulate and fluent. Psych: Mood and affect are appropriate to situation.       Discussed the use of probiotics to help prevent GI issues including C-diff colitis. Reviewed signs or symptoms that would warrant further evaluation. Kirstie Hanks was seen today for congestion. Diagnoses and all orders for this visit:    Acute non-recurrent maxillary sinusitis  -     doxycycline hyclate (VIBRA-TABS) 100 MG tablet;  Take 1 tablet by mouth 2 times daily for 10 days         Seen By:    KIKI Hernandez - CNP

## 2022-12-29 DIAGNOSIS — K21.9 GASTROESOPHAGEAL REFLUX DISEASE WITHOUT ESOPHAGITIS: ICD-10-CM

## 2022-12-30 RX ORDER — SUCRALFATE 1 G/1
TABLET ORAL
Qty: 120 TABLET | Refills: 0 | OUTPATIENT
Start: 2022-12-30

## 2023-02-10 ENCOUNTER — TELEPHONE (OUTPATIENT)
Dept: PRIMARY CARE CLINIC | Age: 48
End: 2023-02-10

## 2023-02-10 NOTE — TELEPHONE ENCOUNTER
Patient calling to see if you would be willing to fill her Xanax  1 mg. She takes it nightly for sleep.  She states her new doctor dr. Trinity Mccormick will not fill it since Hermelindo Clark filled it last. She will either need Lynda to continue filling that medication or she will need to find Dr. Teresa Babb to have him fill this medication          Please advise

## 2023-02-14 NOTE — TELEPHONE ENCOUNTER
Patient notified and showed understanding, she did say that she would be changing providers yet again because doctors only want to prescribe what they want to and not what the patient needs.

## 2023-11-30 ENCOUNTER — OFFICE VISIT (OUTPATIENT)
Dept: FAMILY MEDICINE CLINIC | Age: 48
End: 2023-11-30
Payer: COMMERCIAL

## 2023-11-30 VITALS
TEMPERATURE: 98.2 F | DIASTOLIC BLOOD PRESSURE: 68 MMHG | SYSTOLIC BLOOD PRESSURE: 114 MMHG | WEIGHT: 230 LBS | BODY MASS INDEX: 34.07 KG/M2 | HEIGHT: 69 IN | OXYGEN SATURATION: 96 % | RESPIRATION RATE: 18 BRPM | HEART RATE: 76 BPM

## 2023-11-30 DIAGNOSIS — I89.0 LYMPHEDEMA OF BOTH LOWER EXTREMITIES: ICD-10-CM

## 2023-11-30 DIAGNOSIS — L03.115 BILATERAL LOWER LEG CELLULITIS: ICD-10-CM

## 2023-11-30 DIAGNOSIS — H10.33 ACUTE BACTERIAL CONJUNCTIVITIS OF BOTH EYES: Primary | ICD-10-CM

## 2023-11-30 DIAGNOSIS — L03.116 BILATERAL LOWER LEG CELLULITIS: ICD-10-CM

## 2023-11-30 PROCEDURE — G8484 FLU IMMUNIZE NO ADMIN: HCPCS | Performed by: PHYSICIAN ASSISTANT

## 2023-11-30 PROCEDURE — G8417 CALC BMI ABV UP PARAM F/U: HCPCS | Performed by: PHYSICIAN ASSISTANT

## 2023-11-30 PROCEDURE — 99214 OFFICE O/P EST MOD 30 MIN: CPT | Performed by: PHYSICIAN ASSISTANT

## 2023-11-30 PROCEDURE — G8427 DOCREV CUR MEDS BY ELIG CLIN: HCPCS | Performed by: PHYSICIAN ASSISTANT

## 2023-11-30 PROCEDURE — 1036F TOBACCO NON-USER: CPT | Performed by: PHYSICIAN ASSISTANT

## 2023-11-30 RX ORDER — DOXYCYCLINE HYCLATE 100 MG
100 TABLET ORAL 2 TIMES DAILY
Qty: 20 TABLET | Refills: 0 | Status: SHIPPED | OUTPATIENT
Start: 2023-11-30 | End: 2023-12-10

## 2023-11-30 RX ORDER — TOBRAMYCIN 3 MG/ML
SOLUTION/ DROPS OPHTHALMIC
Qty: 5 ML | Refills: 0 | Status: SHIPPED | OUTPATIENT
Start: 2023-11-30

## 2024-04-24 ENCOUNTER — TELEPHONE (OUTPATIENT)
Dept: PRIMARY CARE CLINIC | Age: 49
End: 2024-04-24

## 2024-04-24 NOTE — TELEPHONE ENCOUNTER
Pt called and said that she wants to know if she can come back and be a pt of Lynda Fischer. Because the new DrKellee She went to is in [place] and she can't get there. She lives here in [place].  Please call and advise

## 2024-05-16 ENCOUNTER — OFFICE VISIT (OUTPATIENT)
Dept: FAMILY MEDICINE CLINIC | Age: 49
End: 2024-05-16
Payer: COMMERCIAL

## 2024-05-16 VITALS
TEMPERATURE: 97.5 F | SYSTOLIC BLOOD PRESSURE: 142 MMHG | HEIGHT: 69 IN | DIASTOLIC BLOOD PRESSURE: 80 MMHG | BODY MASS INDEX: 33.97 KG/M2 | OXYGEN SATURATION: 97 % | HEART RATE: 80 BPM | RESPIRATION RATE: 18 BRPM

## 2024-05-16 DIAGNOSIS — R29.898 RIGHT LEG WEAKNESS: Primary | ICD-10-CM

## 2024-05-16 DIAGNOSIS — M79.604 RIGHT LEG PAIN: ICD-10-CM

## 2024-05-16 PROBLEM — I89.0 LYMPHEDEMA: Status: ACTIVE | Noted: 2024-05-16

## 2024-05-16 PROBLEM — D64.9 ANEMIA: Status: ACTIVE | Noted: 2024-05-16

## 2024-05-16 PROBLEM — K58.0 IRRITABLE BOWEL SYNDROME WITH DIARRHEA: Status: ACTIVE | Noted: 2024-05-16

## 2024-05-16 PROBLEM — E53.8 COBALAMIN DEFICIENCY: Status: ACTIVE | Noted: 2024-05-16

## 2024-05-16 PROBLEM — D50.9 IRON DEFICIENCY ANEMIA: Status: ACTIVE | Noted: 2024-05-16

## 2024-05-16 PROBLEM — E66.9 OBESITY: Status: ACTIVE | Noted: 2024-05-16

## 2024-05-16 PROBLEM — F32.A DEPRESSIVE DISORDER: Status: ACTIVE | Noted: 2024-05-16

## 2024-05-16 PROBLEM — R10.84 GENERALIZED ABDOMINAL PAIN: Status: ACTIVE | Noted: 2024-05-16

## 2024-05-16 PROCEDURE — G8417 CALC BMI ABV UP PARAM F/U: HCPCS | Performed by: NURSE PRACTITIONER

## 2024-05-16 PROCEDURE — G8427 DOCREV CUR MEDS BY ELIG CLIN: HCPCS | Performed by: NURSE PRACTITIONER

## 2024-05-16 PROCEDURE — 1036F TOBACCO NON-USER: CPT | Performed by: NURSE PRACTITIONER

## 2024-05-16 PROCEDURE — 99214 OFFICE O/P EST MOD 30 MIN: CPT | Performed by: NURSE PRACTITIONER

## 2024-05-16 RX ORDER — FERROUS SULFATE 325(65) MG
1 TABLET ORAL EVERY 12 HOURS
COMMUNITY
Start: 2024-04-24

## 2024-05-16 NOTE — PROGRESS NOTES
24  Tatiana Silveira : 1975 Sex: female  Age 48 y.o.    Subjective:  Chief Complaint   Patient presents with    Numbness       HPI:   Tatiana Silveira , 48 y.o. female presents to the clinic for evaluation of right leg pain / edema x 3 days. The patient also reports associated numbness / tingling / increased weakness.  The patient reports she is unable to stand or pivot on the leg. The patient has not taken any treatment for symptoms. The patient reports worsening symptoms over time. The patient denies injury, ecchymosis, erythema, back pain. The patient also denies headache, fever, chest pain, abdominal pain, shortness of breath, and nausea / vomiting / diarrhea.    ROS:   Unless otherwise stated in this report the patient's positive and negative responses for review of systems for constitutional, eyes, ENT, cardiovascular, respiratory, gastrointestinal, neurological, , musculoskeletal, and integument systems and related systems to the presenting problem are either stated in the history of present illness or were not pertinent or were negative for the symptoms and/or complaints related to the presenting medical problem.  Positives and pertinent negatives as per HPI.  All others reviewed and are negative.      PMH:     Past Medical History:   Diagnosis Date    Cerebral palsy (HCC)     Lymphedema        History reviewed. No pertinent surgical history.    History reviewed. No pertinent family history.    Medications:     Current Outpatient Medications:     FEROSUL 325 (65 Fe) MG tablet, Take 1 tablet by mouth in the morning and 1 tablet in the evening., Disp: , Rfl:     ALPRAZolam (XANAX) 1 MG tablet, Take 2 tablets by mouth nightly as needed for Sleep., Disp: , Rfl:     folic acid (FOLVITE) 1 MG tablet, Take 1 tablet by mouth daily, Disp: , Rfl:     vitamin B-12 (CYANOCOBALAMIN) 500 MCG tablet, Take 1 tablet by mouth daily, Disp: , Rfl:     vitamin D3 (CHOLECALCIFEROL) 25 MCG (1000 UT) TABS tablet, Take 1

## 2024-05-21 ENCOUNTER — HOSPITAL ENCOUNTER (INPATIENT)
Age: 49
LOS: 3 days | Discharge: HOME HEALTH | End: 2024-05-24
Attending: STUDENT IN AN ORGANIZED HEALTH CARE EDUCATION/TRAINING PROGRAM | Admitting: INTERNAL MEDICINE
Payer: COMMERCIAL

## 2024-05-21 ENCOUNTER — APPOINTMENT (OUTPATIENT)
Dept: ULTRASOUND IMAGING | Age: 49
End: 2024-05-21
Payer: COMMERCIAL

## 2024-05-21 DIAGNOSIS — R60.0 PERIPHERAL EDEMA: ICD-10-CM

## 2024-05-21 DIAGNOSIS — I89.0 LYMPH EDEMA: ICD-10-CM

## 2024-05-21 DIAGNOSIS — L03.119 CELLULITIS OF LOWER EXTREMITY, UNSPECIFIED LATERALITY: Primary | ICD-10-CM

## 2024-05-21 DIAGNOSIS — S81.801A OPEN WOUND, LOWER LEG, RIGHT, INITIAL ENCOUNTER: ICD-10-CM

## 2024-05-21 DIAGNOSIS — E87.6 HYPOKALEMIA: ICD-10-CM

## 2024-05-21 PROBLEM — L03.115 CELLULITIS OF BOTH LOWER EXTREMITIES: Status: ACTIVE | Noted: 2024-05-21

## 2024-05-21 PROBLEM — L03.116 CELLULITIS OF BOTH LOWER EXTREMITIES: Status: ACTIVE | Noted: 2024-05-21

## 2024-05-21 LAB
ALBUMIN SERPL-MCNC: 3.9 G/DL (ref 3.5–5.2)
ALP SERPL-CCNC: 79 U/L (ref 35–104)
ALT SERPL-CCNC: 8 U/L (ref 0–32)
ANION GAP SERPL CALCULATED.3IONS-SCNC: 11 MMOL/L (ref 7–16)
AST SERPL-CCNC: 13 U/L (ref 0–31)
BASOPHILS # BLD: 0.01 K/UL (ref 0–0.2)
BASOPHILS NFR BLD: 0 % (ref 0–2)
BILIRUB SERPL-MCNC: 0.5 MG/DL (ref 0–1.2)
BNP SERPL-MCNC: 46 PG/ML (ref 0–125)
BUN SERPL-MCNC: 12 MG/DL (ref 6–20)
CALCIUM SERPL-MCNC: 9.2 MG/DL (ref 8.6–10.2)
CHLORIDE SERPL-SCNC: 103 MMOL/L (ref 98–107)
CO2 SERPL-SCNC: 25 MMOL/L (ref 22–29)
CREAT SERPL-MCNC: 0.7 MG/DL (ref 0.5–1)
CRP SERPL HS-MCNC: 191 MG/L (ref 0–5)
EOSINOPHIL # BLD: 0.21 K/UL (ref 0.05–0.5)
EOSINOPHILS RELATIVE PERCENT: 2 % (ref 0–6)
ERYTHROCYTE [DISTWIDTH] IN BLOOD BY AUTOMATED COUNT: 14.8 % (ref 11.5–15)
ERYTHROCYTE [SEDIMENTATION RATE] IN BLOOD BY WESTERGREN METHOD: 53 MM/HR (ref 0–20)
GFR, ESTIMATED: >90 ML/MIN/1.73M2
GLUCOSE SERPL-MCNC: 103 MG/DL (ref 74–99)
HCT VFR BLD AUTO: 35 % (ref 34–48)
HGB BLD-MCNC: 10.6 G/DL (ref 11.5–15.5)
IMM GRANULOCYTES # BLD AUTO: 0.07 K/UL (ref 0–0.58)
IMM GRANULOCYTES NFR BLD: 1 % (ref 0–5)
LACTATE BLDV-SCNC: 1 MMOL/L (ref 0.5–2.2)
LYMPHOCYTES NFR BLD: 1.02 K/UL (ref 1.5–4)
LYMPHOCYTES RELATIVE PERCENT: 10 % (ref 20–42)
MCH RBC QN AUTO: 25 PG (ref 26–35)
MCHC RBC AUTO-ENTMCNC: 30.3 G/DL (ref 32–34.5)
MCV RBC AUTO: 82.5 FL (ref 80–99.9)
MONOCYTES NFR BLD: 0.46 K/UL (ref 0.1–0.95)
MONOCYTES NFR BLD: 5 % (ref 2–12)
NEUTROPHILS NFR BLD: 82 % (ref 43–80)
NEUTS SEG NFR BLD: 8.01 K/UL (ref 1.8–7.3)
PARTIAL THROMBOPLASTIN TIME: 31 SEC (ref 24.5–35.1)
PLATELET # BLD AUTO: 323 K/UL (ref 130–450)
PMV BLD AUTO: 9.2 FL (ref 7–12)
POTASSIUM SERPL-SCNC: 3.1 MMOL/L (ref 3.5–5)
PROT SERPL-MCNC: 7.7 G/DL (ref 6.4–8.3)
RBC # BLD AUTO: 4.24 M/UL (ref 3.5–5.5)
SODIUM SERPL-SCNC: 139 MMOL/L (ref 132–146)
TROPONIN I SERPL HS-MCNC: <6 NG/L (ref 0–9)
WBC OTHER # BLD: 9.8 K/UL (ref 4.5–11.5)

## 2024-05-21 PROCEDURE — 93970 EXTREMITY STUDY: CPT

## 2024-05-21 PROCEDURE — 2580000003 HC RX 258: Performed by: HOSPITALIST

## 2024-05-21 PROCEDURE — 87040 BLOOD CULTURE FOR BACTERIA: CPT

## 2024-05-21 PROCEDURE — 6360000002 HC RX W HCPCS: Performed by: HOSPITALIST

## 2024-05-21 PROCEDURE — 6370000000 HC RX 637 (ALT 250 FOR IP): Performed by: NURSE PRACTITIONER

## 2024-05-21 PROCEDURE — 2580000003 HC RX 258: Performed by: NURSE PRACTITIONER

## 2024-05-21 PROCEDURE — 87075 CULTR BACTERIA EXCEPT BLOOD: CPT

## 2024-05-21 PROCEDURE — 80053 COMPREHEN METABOLIC PANEL: CPT

## 2024-05-21 PROCEDURE — 85730 THROMBOPLASTIN TIME PARTIAL: CPT

## 2024-05-21 PROCEDURE — 84484 ASSAY OF TROPONIN QUANT: CPT

## 2024-05-21 PROCEDURE — 1200000000 HC SEMI PRIVATE

## 2024-05-21 PROCEDURE — 6360000002 HC RX W HCPCS: Performed by: NURSE PRACTITIONER

## 2024-05-21 PROCEDURE — 85025 COMPLETE CBC W/AUTO DIFF WBC: CPT

## 2024-05-21 PROCEDURE — 83880 ASSAY OF NATRIURETIC PEPTIDE: CPT

## 2024-05-21 PROCEDURE — 87205 SMEAR GRAM STAIN: CPT

## 2024-05-21 PROCEDURE — 96375 TX/PRO/DX INJ NEW DRUG ADDON: CPT

## 2024-05-21 PROCEDURE — 83605 ASSAY OF LACTIC ACID: CPT

## 2024-05-21 PROCEDURE — 87070 CULTURE OTHR SPECIMN AEROBIC: CPT

## 2024-05-21 PROCEDURE — 87077 CULTURE AEROBIC IDENTIFY: CPT

## 2024-05-21 PROCEDURE — 93005 ELECTROCARDIOGRAM TRACING: CPT | Performed by: NURSE PRACTITIONER

## 2024-05-21 PROCEDURE — 86140 C-REACTIVE PROTEIN: CPT

## 2024-05-21 PROCEDURE — 96374 THER/PROPH/DIAG INJ IV PUSH: CPT

## 2024-05-21 PROCEDURE — 85652 RBC SED RATE AUTOMATED: CPT

## 2024-05-21 PROCEDURE — 99285 EMERGENCY DEPT VISIT HI MDM: CPT

## 2024-05-21 PROCEDURE — 86403 PARTICLE AGGLUT ANTBDY SCRN: CPT

## 2024-05-21 RX ORDER — SODIUM CHLORIDE 0.9 % (FLUSH) 0.9 %
5-40 SYRINGE (ML) INJECTION EVERY 12 HOURS SCHEDULED
Status: DISCONTINUED | OUTPATIENT
Start: 2024-05-21 | End: 2024-05-24 | Stop reason: HOSPADM

## 2024-05-21 RX ORDER — ONDANSETRON 2 MG/ML
4 INJECTION INTRAMUSCULAR; INTRAVENOUS EVERY 6 HOURS PRN
Status: DISCONTINUED | OUTPATIENT
Start: 2024-05-21 | End: 2024-05-24 | Stop reason: HOSPADM

## 2024-05-21 RX ORDER — ACETAMINOPHEN 325 MG/1
650 TABLET ORAL EVERY 4 HOURS PRN
Status: DISCONTINUED | OUTPATIENT
Start: 2024-05-21 | End: 2024-05-22 | Stop reason: SDUPTHER

## 2024-05-21 RX ORDER — ENOXAPARIN SODIUM 100 MG/ML
40 INJECTION SUBCUTANEOUS DAILY
Status: DISCONTINUED | OUTPATIENT
Start: 2024-05-21 | End: 2024-05-23 | Stop reason: DRUGHIGH

## 2024-05-21 RX ORDER — SODIUM CHLORIDE 0.9 % (FLUSH) 0.9 %
5-40 SYRINGE (ML) INJECTION PRN
Status: DISCONTINUED | OUTPATIENT
Start: 2024-05-21 | End: 2024-05-24 | Stop reason: HOSPADM

## 2024-05-21 RX ORDER — ONDANSETRON 4 MG/1
4 TABLET, ORALLY DISINTEGRATING ORAL EVERY 8 HOURS PRN
Status: DISCONTINUED | OUTPATIENT
Start: 2024-05-21 | End: 2024-05-24 | Stop reason: HOSPADM

## 2024-05-21 RX ORDER — SODIUM CHLORIDE 9 MG/ML
INJECTION, SOLUTION INTRAVENOUS PRN
Status: DISCONTINUED | OUTPATIENT
Start: 2024-05-21 | End: 2024-05-24 | Stop reason: HOSPADM

## 2024-05-21 RX ORDER — POTASSIUM CHLORIDE 7.45 MG/ML
10 INJECTION INTRAVENOUS ONCE
Status: COMPLETED | OUTPATIENT
Start: 2024-05-21 | End: 2024-05-21

## 2024-05-21 RX ORDER — CLOTRIMAZOLE 1 %
CREAM (GRAM) TOPICAL
COMMUNITY
Start: 2024-04-23

## 2024-05-21 RX ORDER — POTASSIUM CHLORIDE 20 MEQ/1
40 TABLET, EXTENDED RELEASE ORAL ONCE
Status: COMPLETED | OUTPATIENT
Start: 2024-05-21 | End: 2024-05-21

## 2024-05-21 RX ORDER — ACETAMINOPHEN 500 MG
1000 TABLET ORAL ONCE
Status: COMPLETED | OUTPATIENT
Start: 2024-05-21 | End: 2024-05-21

## 2024-05-21 RX ADMIN — POTASSIUM CHLORIDE 40 MEQ: 1500 TABLET, EXTENDED RELEASE ORAL at 16:29

## 2024-05-21 RX ADMIN — ENOXAPARIN SODIUM 40 MG: 100 INJECTION SUBCUTANEOUS at 20:31

## 2024-05-21 RX ADMIN — POTASSIUM CHLORIDE 10 MEQ: 7.46 INJECTION, SOLUTION INTRAVENOUS at 17:13

## 2024-05-21 RX ADMIN — ACETAMINOPHEN 1000 MG: 500 TABLET ORAL at 20:30

## 2024-05-21 RX ADMIN — WATER 2000 MG: 1 INJECTION INTRAMUSCULAR; INTRAVENOUS; SUBCUTANEOUS at 16:29

## 2024-05-21 RX ADMIN — SODIUM CHLORIDE, PRESERVATIVE FREE 10 ML: 5 INJECTION INTRAVENOUS at 20:24

## 2024-05-21 ASSESSMENT — PAIN DESCRIPTION - ORIENTATION
ORIENTATION: LEFT;RIGHT
ORIENTATION: LEFT;RIGHT

## 2024-05-21 ASSESSMENT — PAIN - FUNCTIONAL ASSESSMENT
PAIN_FUNCTIONAL_ASSESSMENT: PREVENTS OR INTERFERES WITH MANY ACTIVE NOT PASSIVE ACTIVITIES
PAIN_FUNCTIONAL_ASSESSMENT: 0-10

## 2024-05-21 ASSESSMENT — PAIN DESCRIPTION - PAIN TYPE
TYPE: CHRONIC PAIN
TYPE: ACUTE PAIN

## 2024-05-21 ASSESSMENT — PAIN DESCRIPTION - ONSET
ONSET: ON-GOING
ONSET: ON-GOING

## 2024-05-21 ASSESSMENT — PAIN DESCRIPTION - FREQUENCY
FREQUENCY: INTERMITTENT
FREQUENCY: CONTINUOUS

## 2024-05-21 ASSESSMENT — PAIN SCALES - GENERAL
PAINLEVEL_OUTOF10: 10
PAINLEVEL_OUTOF10: 7

## 2024-05-21 ASSESSMENT — PAIN DESCRIPTION - DESCRIPTORS
DESCRIPTORS: DISCOMFORT
DESCRIPTORS: ACHING;BURNING

## 2024-05-21 ASSESSMENT — PAIN DESCRIPTION - LOCATION
LOCATION: LEG
LOCATION: LEG

## 2024-05-21 NOTE — ED NOTES
ED to Inpatient Handoff Report    Notified chace that electronic handoff available and patient ready for transport to room 509.    Safety Risks: None identified    Patient in Restraints: no    Constant Observer or Patient : no    Telemetry Monitoring Ordered: No          Order to transfer to unit without monitor: NA    Last MEWS: 1 Time completed: 1912    Deterioration Index: 17.71    Vitals:    05/21/24 1236 05/21/24 1859   BP: (!) 148/92 131/79   Pulse: 94 78   Resp: 18 18   Temp: 97.9 °F (36.6 °C) 98 °F (36.7 °C)   TempSrc:  Oral   SpO2: 100% 97%   Weight: 90.7 kg (200 lb)    Height: 1.727 m (5' 8\")        Opportunity for questions and clarification was provided.

## 2024-05-21 NOTE — ED TRIAGE NOTES
FIRST PROVIDER CONTACT ASSESSMENT NOTE       Department of Emergency Medicine                 First Provider Note            24  12:37 PM EDT    Date of Encounter: No admission date for patient encounter.    Patient Name: Tatiana Silveira  : 1975  MRN: [id number]    Chief Complaint: Wound Check (Cellulitis moving up into thigh today was at knee yesterday) and Fever ()      History of Present Illness:   Tatiana Silveira is a 48 y.o. female who presents to the ED for abnormal rash and redness to both legs.  History of lymphedema.  Now with redness and streaking going up the leg.   Temp was 100.2 at home.   Higher last evening    Focused Physical Exam:  VS:    ED Triage Vitals   BP Temp Temp src Pulse Resp SpO2 Height Weight   -- -- -- -- -- -- -- --        Physical Ex: Constitutional: Alert and non-toxic.    Medical History:  has a past medical history of Cerebral palsy (HCC) and Lymphedema.  Surgical History:  has no past surgical history on file.  Social History:  reports that she has never smoked. She has never used smokeless tobacco. She reports that she does not currently use alcohol. She reports that she does not use drugs.  Family History: family history is not on file.    Allergies: Latex, Morphine, Morphine, Pcn [penicillins], Penicillin v, and Penicillins     Initial Plan of Care: Initiate Treatment-Testing, Proceed toTreatment Area When Bed Available for ED Attending/MLP to Continue Care      ---END OF FIRST PROVIDER CONTACT ASSESSMENT NOTE---  Electronically signed by Genna Conrad PA-C   DD: 24

## 2024-05-21 NOTE — PROGRESS NOTES
Database initiated. Patient is A&O comes in from home with her wife and kids. She has Cerebral Palsy and requires a wheelchair. She can usually pivot with help but not right now d/t pain and swelling. She is incontinent and will need a purewick. She prefers no iv sticks in her left arm but BP cuff is fine.   Updated patients at home mediations list. She has not been taking her lasix because she can't get herself into the bathroom.     Patient is interested in maybe getting an at home pure wic system if her insurance will cover it.

## 2024-05-21 NOTE — ED PROVIDER NOTES
Shared BI-ED Attending Visit.  CC: No            [facility] EMERGENCY DEPARTMENT  ED  Encounter Note  Admit Date/RoomTime: 2024  2:26 PM  ED Room:   NAME: Tatiana Silveira  : 1975  MRN: [id number]  PCP: Keo Wagner DO    CHIEF COMPLAINT     Wound Check (Cellulitis moving up into thigh today was at knee yesterday) and Fever ()    HISTORY OF PRESENT ILLNESS        Tatiana Silveira is a 48 y.o. female who presents to the ED by private vehicle with a friend for complaints of bilateral lower leg edema and states she had a fever of 103.7 °F last evening and took ibuprofen and states she has streaking and redness up her left leg all the way to the groin. She was seen at  [facility] urgent care on 5/15/24 and was advised to go to the ED at that time for evaluation.  She has a history of cerebral palsy and is wheelchair-bound and usually stands on her feet to pivot but lately she has not been able to stand on her feet due to increased pain in her lower extremities.  She does take a water pill but has not been taking it as often as she should because she is unable to get to the bathroom.  She denies any chest pain, shortness of breath, abdominal pain, cough, nausea, vomiting or any other symptoms.  She does have open will wound on her right lateral lower leg, The complaint has been persistent and gradually worsening and are moderate in severity.     REVIEW OF SYSTEMS     Pertinent positives and negatives are stated within HPI, all other systems reviewed and are negative.    Past Medical History:  has a past medical history of Cerebral palsy (HCC) and Lymphedema.  Surgical History:  has no past surgical history on file.  Social History:  reports that she has never smoked. She has never used smokeless tobacco. She reports that she does not currently use alcohol. She reports that she does not use drugs.  Family History: family history is not on file.   Allergies: Latex,

## 2024-05-22 PROBLEM — G80.9 CEREBRAL PALSY (HCC): Status: ACTIVE | Noted: 2024-05-22

## 2024-05-22 LAB
ANION GAP SERPL CALCULATED.3IONS-SCNC: 11 MMOL/L (ref 7–16)
ASO AB SERPL-ACNC: 25 IU/ML (ref 0–200)
BACTERIA URNS QL MICRO: ABNORMAL
BASOPHILS # BLD: 0.01 K/UL (ref 0–0.2)
BASOPHILS NFR BLD: 0 % (ref 0–2)
BILIRUB UR QL STRIP: NEGATIVE
BUN SERPL-MCNC: 10 MG/DL (ref 6–20)
CALCIUM SERPL-MCNC: 8.7 MG/DL (ref 8.6–10.2)
CHLORIDE SERPL-SCNC: 106 MMOL/L (ref 98–107)
CLARITY UR: CLEAR
CO2 SERPL-SCNC: 23 MMOL/L (ref 22–29)
COLOR UR: YELLOW
CREAT SERPL-MCNC: 0.6 MG/DL (ref 0.5–1)
EKG ATRIAL RATE: 92 BPM
EKG P AXIS: 23 DEGREES
EKG P-R INTERVAL: 174 MS
EKG Q-T INTERVAL: 370 MS
EKG QRS DURATION: 84 MS
EKG QTC CALCULATION (BAZETT): 457 MS
EKG R AXIS: -8 DEGREES
EKG T AXIS: 16 DEGREES
EKG VENTRICULAR RATE: 92 BPM
EOSINOPHIL # BLD: 0.26 K/UL (ref 0.05–0.5)
EOSINOPHILS RELATIVE PERCENT: 3 % (ref 0–6)
ERYTHROCYTE [DISTWIDTH] IN BLOOD BY AUTOMATED COUNT: 14.5 % (ref 11.5–15)
GFR, ESTIMATED: >90 ML/MIN/1.73M2
GLUCOSE SERPL-MCNC: 133 MG/DL (ref 74–99)
GLUCOSE UR STRIP-MCNC: NEGATIVE MG/DL
HCT VFR BLD AUTO: 31.3 % (ref 34–48)
HGB BLD-MCNC: 9.9 G/DL (ref 11.5–15.5)
HGB UR QL STRIP.AUTO: ABNORMAL
IMM GRANULOCYTES # BLD AUTO: 0.06 K/UL (ref 0–0.58)
IMM GRANULOCYTES NFR BLD: 1 % (ref 0–5)
KETONES UR STRIP-MCNC: 15 MG/DL
LEUKOCYTE ESTERASE UR QL STRIP: NEGATIVE
LYMPHOCYTES NFR BLD: 1.12 K/UL (ref 1.5–4)
LYMPHOCYTES RELATIVE PERCENT: 15 % (ref 20–42)
MCH RBC QN AUTO: 25.6 PG (ref 26–35)
MCHC RBC AUTO-ENTMCNC: 31.6 G/DL (ref 32–34.5)
MCV RBC AUTO: 80.9 FL (ref 80–99.9)
MONOCYTES NFR BLD: 0.45 K/UL (ref 0.1–0.95)
MONOCYTES NFR BLD: 6 % (ref 2–12)
NEUTROPHILS NFR BLD: 75 % (ref 43–80)
NEUTS SEG NFR BLD: 5.84 K/UL (ref 1.8–7.3)
NITRITE UR QL STRIP: NEGATIVE
PH UR STRIP: 6 [PH] (ref 5–9)
PLATELET # BLD AUTO: 283 K/UL (ref 130–450)
PMV BLD AUTO: 9.3 FL (ref 7–12)
POTASSIUM SERPL-SCNC: 3.6 MMOL/L (ref 3.5–5)
PROT UR STRIP-MCNC: NEGATIVE MG/DL
RBC # BLD AUTO: 3.87 M/UL (ref 3.5–5.5)
RBC #/AREA URNS HPF: ABNORMAL /HPF
SODIUM SERPL-SCNC: 140 MMOL/L (ref 132–146)
SP GR UR STRIP: <1.005 (ref 1–1.03)
UROBILINOGEN UR STRIP-ACNC: 0.2 EU/DL (ref 0–1)
WBC #/AREA URNS HPF: ABNORMAL /HPF
WBC OTHER # BLD: 7.7 K/UL (ref 4.5–11.5)

## 2024-05-22 PROCEDURE — 1200000000 HC SEMI PRIVATE

## 2024-05-22 PROCEDURE — 93010 ELECTROCARDIOGRAM REPORT: CPT | Performed by: INTERNAL MEDICINE

## 2024-05-22 PROCEDURE — 6370000000 HC RX 637 (ALT 250 FOR IP): Performed by: HOSPITALIST

## 2024-05-22 PROCEDURE — 6360000002 HC RX W HCPCS: Performed by: HOSPITALIST

## 2024-05-22 PROCEDURE — 6360000002 HC RX W HCPCS: Performed by: INTERNAL MEDICINE

## 2024-05-22 PROCEDURE — 87086 URINE CULTURE/COLONY COUNT: CPT

## 2024-05-22 PROCEDURE — 6370000000 HC RX 637 (ALT 250 FOR IP): Performed by: REGISTERED NURSE

## 2024-05-22 PROCEDURE — 80048 BASIC METABOLIC PNL TOTAL CA: CPT

## 2024-05-22 PROCEDURE — 2580000003 HC RX 258: Performed by: HOSPITALIST

## 2024-05-22 PROCEDURE — 94640 AIRWAY INHALATION TREATMENT: CPT

## 2024-05-22 PROCEDURE — 85025 COMPLETE CBC W/AUTO DIFF WBC: CPT

## 2024-05-22 PROCEDURE — 36415 COLL VENOUS BLD VENIPUNCTURE: CPT

## 2024-05-22 PROCEDURE — 81001 URINALYSIS AUTO W/SCOPE: CPT

## 2024-05-22 PROCEDURE — 86063 ANTISTREPTOLYSIN O SCREEN: CPT

## 2024-05-22 RX ORDER — BUDESONIDE 0.25 MG/2ML
0.25 INHALANT ORAL
Status: DISCONTINUED | OUTPATIENT
Start: 2024-05-22 | End: 2024-05-24 | Stop reason: HOSPADM

## 2024-05-22 RX ORDER — QUETIAPINE FUMARATE 100 MG/1
200 TABLET, FILM COATED ORAL DAILY
Status: DISCONTINUED | OUTPATIENT
Start: 2024-05-22 | End: 2024-05-23

## 2024-05-22 RX ORDER — LINEZOLID 600 MG/1
600 TABLET, FILM COATED ORAL EVERY 12 HOURS SCHEDULED
Status: DISCONTINUED | OUTPATIENT
Start: 2024-05-22 | End: 2024-05-24 | Stop reason: HOSPADM

## 2024-05-22 RX ORDER — POTASSIUM CHLORIDE 20 MEQ/1
40 TABLET, EXTENDED RELEASE ORAL PRN
Status: DISCONTINUED | OUTPATIENT
Start: 2024-05-22 | End: 2024-05-24 | Stop reason: HOSPADM

## 2024-05-22 RX ORDER — ALBUTEROL SULFATE 90 UG/1
2 AEROSOL, METERED RESPIRATORY (INHALATION) EVERY 6 HOURS PRN
Status: DISCONTINUED | OUTPATIENT
Start: 2024-05-22 | End: 2024-05-22 | Stop reason: CLARIF

## 2024-05-22 RX ORDER — ACETAMINOPHEN 650 MG/1
650 SUPPOSITORY RECTAL EVERY 6 HOURS PRN
Status: DISCONTINUED | OUTPATIENT
Start: 2024-05-22 | End: 2024-05-24 | Stop reason: HOSPADM

## 2024-05-22 RX ORDER — SUCRALFATE 1 G/1
1 TABLET ORAL 3 TIMES DAILY
Status: DISCONTINUED | OUTPATIENT
Start: 2024-05-22 | End: 2024-05-24 | Stop reason: HOSPADM

## 2024-05-22 RX ORDER — FLUTICASONE PROPIONATE 50 MCG
2 SPRAY, SUSPENSION (ML) NASAL DAILY
Status: DISCONTINUED | OUTPATIENT
Start: 2024-05-22 | End: 2024-05-24 | Stop reason: HOSPADM

## 2024-05-22 RX ORDER — HYDROCHLOROTHIAZIDE 25 MG/1
25 TABLET ORAL EVERY MORNING
Status: DISCONTINUED | OUTPATIENT
Start: 2024-05-22 | End: 2024-05-24 | Stop reason: HOSPADM

## 2024-05-22 RX ORDER — FLUTICASONE PROPIONATE 44 UG/1
2 AEROSOL, METERED RESPIRATORY (INHALATION) 2 TIMES DAILY
Status: DISCONTINUED | OUTPATIENT
Start: 2024-05-22 | End: 2024-05-22 | Stop reason: CLARIF

## 2024-05-22 RX ORDER — ZOLPIDEM TARTRATE 5 MG/1
5 TABLET ORAL NIGHTLY PRN
Status: DISCONTINUED | OUTPATIENT
Start: 2024-05-22 | End: 2024-05-24 | Stop reason: HOSPADM

## 2024-05-22 RX ORDER — GABAPENTIN 300 MG/1
300 CAPSULE ORAL 3 TIMES DAILY
Status: DISCONTINUED | OUTPATIENT
Start: 2024-05-22 | End: 2024-05-24 | Stop reason: HOSPADM

## 2024-05-22 RX ORDER — ALPRAZOLAM 1 MG/1
1 TABLET ORAL 2 TIMES DAILY PRN
Status: DISCONTINUED | OUTPATIENT
Start: 2024-05-22 | End: 2024-05-24 | Stop reason: HOSPADM

## 2024-05-22 RX ORDER — ACETAMINOPHEN 325 MG/1
650 TABLET ORAL EVERY 6 HOURS PRN
Status: DISCONTINUED | OUTPATIENT
Start: 2024-05-22 | End: 2024-05-24 | Stop reason: HOSPADM

## 2024-05-22 RX ORDER — SODIUM CHLORIDE 0.9 % (FLUSH) 0.9 %
5-40 SYRINGE (ML) INJECTION PRN
Status: DISCONTINUED | OUTPATIENT
Start: 2024-05-22 | End: 2024-05-24 | Stop reason: HOSPADM

## 2024-05-22 RX ORDER — SODIUM CHLORIDE 0.9 % (FLUSH) 0.9 %
5-40 SYRINGE (ML) INJECTION EVERY 12 HOURS SCHEDULED
Status: DISCONTINUED | OUTPATIENT
Start: 2024-05-22 | End: 2024-05-24 | Stop reason: HOSPADM

## 2024-05-22 RX ORDER — FUROSEMIDE 20 MG/1
20 TABLET ORAL DAILY
Status: DISCONTINUED | OUTPATIENT
Start: 2024-05-22 | End: 2024-05-24 | Stop reason: HOSPADM

## 2024-05-22 RX ORDER — SODIUM CHLORIDE 9 MG/ML
INJECTION, SOLUTION INTRAVENOUS PRN
Status: DISCONTINUED | OUTPATIENT
Start: 2024-05-22 | End: 2024-05-24 | Stop reason: HOSPADM

## 2024-05-22 RX ORDER — POTASSIUM CHLORIDE 7.45 MG/ML
10 INJECTION INTRAVENOUS PRN
Status: DISCONTINUED | OUTPATIENT
Start: 2024-05-22 | End: 2024-05-24 | Stop reason: HOSPADM

## 2024-05-22 RX ORDER — POLYETHYLENE GLYCOL 3350 17 G/17G
17 POWDER, FOR SOLUTION ORAL DAILY PRN
Status: DISCONTINUED | OUTPATIENT
Start: 2024-05-22 | End: 2024-05-24 | Stop reason: HOSPADM

## 2024-05-22 RX ORDER — SODIUM CHLORIDE 9 MG/ML
INJECTION, SOLUTION INTRAVENOUS CONTINUOUS
Status: DISCONTINUED | OUTPATIENT
Start: 2024-05-22 | End: 2024-05-22

## 2024-05-22 RX ORDER — TOPIRAMATE 25 MG/1
25 TABLET ORAL DAILY
Status: DISCONTINUED | OUTPATIENT
Start: 2024-05-22 | End: 2024-05-24 | Stop reason: HOSPADM

## 2024-05-22 RX ORDER — ALBUTEROL SULFATE 2.5 MG/3ML
2.5 SOLUTION RESPIRATORY (INHALATION) EVERY 6 HOURS PRN
Status: DISCONTINUED | OUTPATIENT
Start: 2024-05-22 | End: 2024-05-24 | Stop reason: HOSPADM

## 2024-05-22 RX ORDER — PANTOPRAZOLE SODIUM 40 MG/1
40 TABLET, DELAYED RELEASE ORAL DAILY
Status: DISCONTINUED | OUTPATIENT
Start: 2024-05-22 | End: 2024-05-24 | Stop reason: HOSPADM

## 2024-05-22 RX ORDER — MAGNESIUM SULFATE IN WATER 40 MG/ML
2000 INJECTION, SOLUTION INTRAVENOUS PRN
Status: DISCONTINUED | OUTPATIENT
Start: 2024-05-22 | End: 2024-05-24 | Stop reason: HOSPADM

## 2024-05-22 RX ORDER — SODIUM CHLORIDE AND POTASSIUM CHLORIDE 150; 900 MG/100ML; MG/100ML
INJECTION, SOLUTION INTRAVENOUS CONTINUOUS
Status: DISCONTINUED | OUTPATIENT
Start: 2024-05-22 | End: 2024-05-23

## 2024-05-22 RX ADMIN — ACETAMINOPHEN 650 MG: 325 TABLET ORAL at 15:52

## 2024-05-22 RX ADMIN — WATER 2000 MG: 1 INJECTION INTRAMUSCULAR; INTRAVENOUS; SUBCUTANEOUS at 15:49

## 2024-05-22 RX ADMIN — BUDESONIDE 250 MCG: 0.25 SUSPENSION RESPIRATORY (INHALATION) at 08:35

## 2024-05-22 RX ADMIN — WATER 2000 MG: 1 INJECTION INTRAMUSCULAR; INTRAVENOUS; SUBCUTANEOUS at 00:51

## 2024-05-22 RX ADMIN — ONDANSETRON 4 MG: 2 INJECTION INTRAMUSCULAR; INTRAVENOUS at 08:45

## 2024-05-22 RX ADMIN — POTASSIUM CHLORIDE AND SODIUM CHLORIDE: 900; 150 INJECTION, SOLUTION INTRAVENOUS at 19:41

## 2024-05-22 RX ADMIN — MICONAZOLE NITRATE: 20 CREAM TOPICAL at 22:07

## 2024-05-22 RX ADMIN — LINEZOLID 600 MG: 600 TABLET, FILM COATED ORAL at 22:00

## 2024-05-22 RX ADMIN — SUCRALFATE 1 G: 1 TABLET ORAL at 22:00

## 2024-05-22 RX ADMIN — SODIUM CHLORIDE, PRESERVATIVE FREE 10 ML: 5 INJECTION INTRAVENOUS at 08:43

## 2024-05-22 RX ADMIN — TOPIRAMATE 25 MG: 25 TABLET, FILM COATED ORAL at 08:45

## 2024-05-22 RX ADMIN — GABAPENTIN 300 MG: 300 CAPSULE ORAL at 22:00

## 2024-05-22 RX ADMIN — FLUTICASONE PROPIONATE 2 SPRAY: 50 SPRAY, METERED NASAL at 08:42

## 2024-05-22 RX ADMIN — SODIUM CHLORIDE, PRESERVATIVE FREE 10 ML: 5 INJECTION INTRAVENOUS at 22:08

## 2024-05-22 RX ADMIN — ACETAMINOPHEN 650 MG: 325 TABLET ORAL at 08:42

## 2024-05-22 RX ADMIN — ONDANSETRON 4 MG: 2 INJECTION INTRAMUSCULAR; INTRAVENOUS at 15:52

## 2024-05-22 RX ADMIN — SUCRALFATE 1 G: 1 TABLET ORAL at 15:04

## 2024-05-22 RX ADMIN — SUCRALFATE 1 G: 1 TABLET ORAL at 08:41

## 2024-05-22 RX ADMIN — GABAPENTIN 300 MG: 300 CAPSULE ORAL at 08:41

## 2024-05-22 RX ADMIN — GABAPENTIN 300 MG: 300 CAPSULE ORAL at 15:04

## 2024-05-22 RX ADMIN — POTASSIUM CHLORIDE AND SODIUM CHLORIDE: 900; 150 INJECTION, SOLUTION INTRAVENOUS at 08:47

## 2024-05-22 RX ADMIN — MICONAZOLE NITRATE: 20 CREAM TOPICAL at 15:04

## 2024-05-22 RX ADMIN — PANTOPRAZOLE SODIUM 40 MG: 40 TABLET, DELAYED RELEASE ORAL at 08:42

## 2024-05-22 RX ADMIN — ENOXAPARIN SODIUM 40 MG: 100 INJECTION SUBCUTANEOUS at 08:42

## 2024-05-22 RX ADMIN — SODIUM CHLORIDE: 9 INJECTION, SOLUTION INTRAVENOUS at 00:58

## 2024-05-22 RX ADMIN — BUDESONIDE 250 MCG: 0.25 SUSPENSION RESPIRATORY (INHALATION) at 20:54

## 2024-05-22 RX ADMIN — QUETIAPINE FUMARATE 200 MG: 100 TABLET ORAL at 22:00

## 2024-05-22 RX ADMIN — WATER 2000 MG: 1 INJECTION INTRAMUSCULAR; INTRAVENOUS; SUBCUTANEOUS at 08:42

## 2024-05-22 ASSESSMENT — PAIN SCALES - GENERAL: PAINLEVEL_OUTOF10: 6

## 2024-05-22 ASSESSMENT — PAIN DESCRIPTION - ONSET: ONSET: ON-GOING

## 2024-05-22 ASSESSMENT — PAIN DESCRIPTION - PAIN TYPE: TYPE: ACUTE PAIN

## 2024-05-22 ASSESSMENT — PAIN DESCRIPTION - ORIENTATION: ORIENTATION: LEFT;RIGHT;POSTERIOR

## 2024-05-22 ASSESSMENT — PAIN DESCRIPTION - DESCRIPTORS: DESCRIPTORS: THROBBING;ACHING

## 2024-05-22 ASSESSMENT — PAIN DESCRIPTION - LOCATION: LOCATION: BACK;LEG

## 2024-05-22 ASSESSMENT — PAIN - FUNCTIONAL ASSESSMENT: PAIN_FUNCTIONAL_ASSESSMENT: PREVENTS OR INTERFERES SOME ACTIVE ACTIVITIES AND ADLS

## 2024-05-22 ASSESSMENT — PAIN DESCRIPTION - FREQUENCY: FREQUENCY: CONTINUOUS

## 2024-05-22 NOTE — PROGRESS NOTES
Nutrition Assessment    Type and Reason for Visit:  Initial, Positive Nutrition Screen    Nutrition Recommendations/Plan:   Continue current diet & monitor     Nutrition Assessment:    Pt admits w/ BLE cellulitis w/ sepsis. Hx CP, lymphedema. Pt consuming >75% of meals, continue current diet & monitor.    Nutrition Related Findings:    A&O X4, I&Os WDL, BLE NP edema, abd soft/obese, +BS, good appetite, IBS/frequent diarrhea per pt   Wound Type: None       Current Nutrition Intake & Therapies:    Average Meal Intake: %  Average Supplements Intake: None Ordered  ADULT DIET; Regular    Anthropometric Measures:  Height: 172.7 cm (5' 8\")  Ideal Body Weight (IBW): 140 lbs (64 kg)       Current Body Weight: 111.7 kg (246 lb 4.8 oz) (5/22 estimated per RD measurement (legs wrapped)), 175.9 % IBW. Weight Source: Bed Scale  Current BMI (kg/m2): 37.5  Usual Body Weight:  (no actual EMR wt hx <1 year)                       BMI Categories: Obese Class 2 (BMI 35.0 -39.9)    Nutrition Diagnosis:   No nutrition diagnosis at this time     Nutrition Interventions:   Food and/or Nutrient Delivery: Continue Current Diet  Nutrition Education/Counseling: No recommendation at this time (pt reports good appetite but wasn't eating much PTA d/t difficulty getting to the bathroom)  Coordination of Nutrition Care: Continue to monitor while inpatient       Goals:     Goals: PO intake 75% or greater, by next RD assessment       Nutrition Monitoring and Evaluation:      Food/Nutrient Intake Outcomes: Food and Nutrient Intake  Physical Signs/Symptoms Outcomes: Biochemical Data, GI Status, Fluid Status or Edema, Nutrition Focused Physical Findings, Skin, Weight    Discharge Planning:    Continue current diet     Miranda D'Amico, RD, LD  Contact: [phone]

## 2024-05-22 NOTE — PATIENT CARE CONFERENCE
[facility] Quality Flow/Interdisciplinary Rounds Progress Note        Quality Flow Rounds held on May 22, 2024    Disciplines Attending:  Bedside Nurse, , , and Nursing Unit Leadership    Tatiana Silveira was admitted on 5/21/2024  2:26 PM    Anticipated Discharge Date:       Disposition:    Reynaldo Score:  Reynaldo Scale Score: 13    Readmission Risk              Risk of Unplanned Readmission:  15           Discussed patient goal for the day, patient clinical progression, and barriers to discharge.  The following Goal(s) of the Day/Commitment(s) have been identified:  Diagnostics - Report Results      Lazara Shahid RN  May 22, 2024

## 2024-05-22 NOTE — PROGRESS NOTES
Patient has Spouse and six Children that are of support. Patient has duarte but is questioning what it is she believes. Patient accepted prayer. Patient engaged in conversation and appreciated 's visit.

## 2024-05-22 NOTE — CARE COORDINATION
Internal Medicine On-call Care Coordination Note     I was called by the ED physician because they recommended admission for this patient and we cover their PCP.  The history as I understand it after discussion with the ED physician is as follows:     Admit patient for cellulitis of the leg  History of lymphedema  Fevers up to 103 at home     I placed admission orders.  Including:     Continue Ancef as ordered  Consult ID to evaluate further recommendations     Dr. Colin or his coverage will see the patient tomorrow for H&P and review of all orders.     Electronically signed by Cody Childers MD on 5/21/2024 at 8:57 PM

## 2024-05-22 NOTE — PROGRESS NOTES
4 Eyes Skin Assessment     NAME:  Tatiana Silveira  YOB: 1975  MEDICAL RECORD NUMBER:  [id number]    The patient is being assessed for  Admission    I agree that at least one RN has performed a thorough Head to Toe Skin Assessment on the patient. ALL assessment sites listed below have been assessed.      Areas assessed by both nurses:    Head, Face, Ears, Shoulders, Back, Chest, Arms, Elbows, Hands, Sacrum. Buttock, Coccyx, Ischium, and Legs. Feet and Heels        Does the Patient have a Wound? No noted wound(s)       Reynaldo Prevention initiated by RN: Yes  Wound Care Orders initiated by RN: No    Pressure Injury (Stage 3,4, Unstageable, DTI, NWPT, and Complex wounds) if present, place Wound referral order by RN under : No    New Ostomies, if present place, Ostomy referral order under : No     Nurse 1 eSignature: Electronically signed by Rena Garcia RN on 5/22/24 at 1:45 AM EDT    **SHARE this note so that the co-signing nurse can place an eSignature**    Nurse 2 eSignature: Electronically signed by Amisha Fu RN on 5/22/24 at 2:34 AM EDT

## 2024-05-22 NOTE — H&P
Internal Medicine History & Physical     Name: Tatiana Silveira  : 1975  Chief Complaint: Wound Check (Cellulitis moving up into thigh today was at knee yesterday) and Fever ()  Primary Care Physician: Keo Wagner DO  Admission date: 2024  Date of service: 2024   Unit: [address] SURG     History of Present Illness  Tatiana is a 48 y.o. year old female.  She presented to the hospital yesterday for bilateral leg swelling and redness.  She states about 2 days ago, she was in the bathroom getting off the toilet where she was trying to move from the toilet and pivot into her chair like she normally does.  When she tried to do this, she noted she had burning type pain in both of her feet and her right leg gave out from underneath her.  Her daughter was able to help her get into the chair.  She then states the next day, she started to feel a bit feverish and warm and kept checking her temperature at home which ultimately got up to 103.7 °F.  She states she then noticed that the redness of her feet that she normally has from her lymphedema seems to have moved up her leg towards her groin.  She has a history of cerebral palsy and normally is able to pivot and uses a power chair other times.  She states she does not know of any wounds on her legs or injuries that she has had.  Overall she feels okay at this time.  Patient denies any lightheadedness or dizziness, nausea or vomiting, chest pain, shortness of breath, abdominal pain, hematuria or dysuria, constipation or diarrhea.  DVT ultrasounds were negative in the emergency department.  Inflammatory markers were elevated.  White blood cell count was normal.  Blood cultures were collected and sent.    There are no family or friends at bedside. The history is provided by the patient. She is felt to be a good historian.    ED course:   Initial blood work and imaging studies performed. Admission recommended by ED physician. My coverage discussed the  case with the ED provider. Meds in the ED consisted of the following:    Past Medical History:   Diagnosis Date    Cerebral palsy (HCC)     Cobalamin deficiency 2024    Depressive disorder 2024    Iron deficiency anemia 2024    Lymphedema     Obesity 2024       Past Surgical History:   Procedure Laterality Date    ACHILLES TENDON SURGERY       SECTION      CHOLECYSTECTOMY      EYE SURGERY      x 4    GROIN SURGERY      hips for CP    KNEE SURGERY      tendons for CP        Family Medical History:  No pertinent family medical history with regard to current issues.    Social History  Patient lives at home with wife and 2 children.   Employment: none  Illicit drug use- denies  TOBACCO:   reports that she has never smoked. She has never used smokeless tobacco.  ETOH:   reports that she does not currently use alcohol.    Home Medications  Prior to Admission medications    Medication Sig Start Date End Date Taking? Authorizing Provider   clotrimazole (LOTRIMIN) 1 % cream Apply topically 24  Yes Ana Cristina Rees MD   folic acid (FOLVITE) 1 MG tablet Take 1 tablet by mouth daily    Ana Cristina Rees MD   vitamin B-12 (CYANOCOBALAMIN) 500 MCG tablet Take 1 tablet by mouth daily    Ana Cristina Rees MD   vitamin D3 (CHOLECALCIFEROL) 25 MCG (1000 UT) TABS tablet Take 1 tablet by mouth daily 21   Lynda Fischer APRN - NP   ondansetron (ZOFRAN) 4 MG tablet Take by mouth 21   Ana Cristina Rees MD   miSOPROStol (CYTOTEC) 100 MCG tablet Take 1 tablet by mouth in the morning and at bedtime 21   Ana Cristina Rees MD   fluticasone (FLOVENT HFA) 44 MCG/ACT inhaler Inhale 2 puffs into the lungs 2 times daily 21   Lynda Fischer APRN - NP   hydroCHLOROthiazide (HYDRODIURIL) 25 MG tablet Take 1 tablet by mouth every morning 21   Lynda Fischer APRN - NP   DEXILANT 60 MG CPDR delayed release capsule Take 1 capsule by mouth daily 21   Lynda Fischer APRN -

## 2024-05-22 NOTE — CARE COORDINATION
Introduced my self and provided explanation of CM role to patient. Patient is awake, alert, and aware of current diagnosis and discharge planning. Patient is from home with her wife and children. Patient has a HX of cerebral palsy but is mostly independent at home. Patient has a wheelchair, grab bars, shower chair, aspen lift at home. Patient is interested in getting a new shower chair but would like one with wheels. Spoke with [facility], this is not something insurance will cover or something most DME will not have in stock. PCP is Dr. Wagner. Preferred pharmacy is [facility]. Patient states she would like to have HHC if needing would care. No preference of HHC agencies if needed. ID following, on PO zyvox.   Electronically signed by Kelly Fischer RN on 5/22/2024 at 2:06 PM

## 2024-05-22 NOTE — CONSULTS
[facility]  [facility]  Consultation Note     Admit Date: 2024  2:26 PM    Reason for Consult:   cellulitis of leg with pcn allergy    Attending Physician:  Bright Colin DO    HISTORY OF PRESENT ILLNESS:             The history is obtained from extensive review of available past medical records. The patient is a 48 y.o. female who is not previously known to the ID service.    Patient presented to the ED at [facility] on 24 with bilateral lower extremity edema and redness on the left leg up to her groin. She had a temp of 103.7 at home, ad was feeling off for a few days. She was seen at the [facility] Urgent care on  for siminal complaints of her right leg and was instructed to the ED however she said this did not happen and she was only told to go to the ED if it worsened. She has a history of cerebral palsy and is wheelchair bound and only able to pivot. She has a history of lymphedema as well, with chronic redness of her bilateral legs from the lymphedema. She was supposed to be on a diuretic but due to difficulties getting to the bathroom so often she stopped taking it. She denied vomiting, diarrhea, shortness of breath. She did have some nausea.      Labs in the ED showed WBC 9.8, , ESR 53. Ultrasound of the legs is negative for DVT. Blood cultures were drawn. A wound culture was done of the right leg where there is weeping and is pending. The left leg shows lymphedema with scaling skin, tinea pedis, and lymphangitic streaking up to the groin. She was started on Ancef. ID was asked to see in consultation.     Past Medical History:        Diagnosis Date    Cerebral palsy (HCC)     Cobalamin deficiency 2024    Depressive disorder 2024    Iron deficiency anemia 2024    Lymphedema     Obesity 2024     Past Surgical History:        Procedure Laterality Date    ACHILLES TENDON SURGERY       SECTION      CHOLECYSTECTOMY      EYE  the groin. The leg is warm to touch and painful. There is tinea pedis present.   Lines: Peripheral.    Lab Results   Component Value Date    .0 (H) 05/21/2024    CRP 9.6 06/01/2022      No results found for: \"[facility]PHS\"  Lab Results   Component Value Date    SEDRATE 53 (H) 05/21/2024    SEDRATE 35 (H) 06/01/2022       Radiology:  Noted    Microbiology:  Blood cultures 5/21: negative so far  Wound culture (right leg): pending     Assessment:  Left leg cellulitis  Fever associated to the above  Tinea pedis  Bilateral lower extremity lymphedema  History of cerebral palsy     Plan:    Continue Ancef, add Linezolid for toxin suppression   Add miconazole between the webspaces  Check cultures, ASO titer  Monitor labs  Will follow with you    Thank you for having us see this patient in consultation. I will be discussing this case with the treating physicians.    KIKI Sanchez CNP  11:48 AM  5/22/2024    Patient seen and examined. I had a face to face encounter with the patient. Agree with exam.  Assessment and plan as outlined above and directed by me. Addition and corrections were done as deemed appropriate. My exam and plan include: The patient is not known to the ID service.  She has a history of cerebral palsy and lives at home with her wife.  She had presented to urgent care on 5/16 with redness and swelling in the left leg.  She was prescribed diuretics for lymphedema but she could not take them because of frequency.  She came to the ED at [facility] on 5/21/2024 with fever and progressive redness.  She now has lymphangitic streaks above her knee consistent with cellulitis of the left leg.  There is dry skin and cracking as well as tinea pedis which may have been the port of entry.  This seems to be her first episode of cellulitis.  She will need some form of compression down the road.  Both legs are wrapped with Kerlix and Ace wrap.  She is tolerating Cephalexin, in spite of a history of allergy to

## 2024-05-22 NOTE — CONSULTS
Podiatry Consult H&P  2024   Tatiana Joradna     HISTORY OF PRESENT ILLNESS: This is a 48 y.o. female seen bedside for bilateral lower extremity wounds. Patient states she has history of CP and chronic lymphedema. Patient states the redness on her left side has become worse recently. Patient denies n,v,f,c,d at this time. Patient has no other pedal complaints.        Past Medical History:   Diagnosis Date    Cerebral palsy (HCC)     Cobalamin deficiency 2024    Depressive disorder 2024    Iron deficiency anemia 2024    Lymphedema     Obesity 2024        Past Surgical History:   Procedure Laterality Date    ACHILLES TENDON SURGERY       SECTION      CHOLECYSTECTOMY      EYE SURGERY      x 4    GROIN SURGERY      hips for CP    KNEE SURGERY      tendons for CP         No family history on file.     Social History     Tobacco Use    Smoking status: Never    Smokeless tobacco: Never   Substance Use Topics    Alcohol use: Not Currently        Prior to Admission medications    Medication Sig Start Date End Date Taking? Authorizing Provider   clotrimazole (LOTRIMIN) 1 % cream Apply topically 24  Yes Ana Cristina Rees MD   folic acid (FOLVITE) 1 MG tablet Take 1 tablet by mouth daily    Ana Cristina Rees MD   vitamin B-12 (CYANOCOBALAMIN) 500 MCG tablet Take 1 tablet by mouth daily    Ana Cristina Rees MD   vitamin D3 (CHOLECALCIFEROL) 25 MCG (1000 UT) TABS tablet Take 1 tablet by mouth daily 21   Lynda Fischer APRN - NP   ondansetron (ZOFRAN) 4 MG tablet Take by mouth 21   Ana Cristina Rees MD   miSOPROStol (CYTOTEC) 100 MCG tablet Take 1 tablet by mouth in the morning and at bedtime 21   Ana Cristina Rees MD   fluticasone (FLOVENT HFA) 44 MCG/ACT inhaler Inhale 2 puffs into the lungs 2 times daily 21   Lynda Fischer APRN - NP   hydroCHLOROthiazide (HYDRODIURIL) 25 MG tablet Take 1 tablet by mouth every morning 21   Lynda Fischer APRN  [penicillins], Penicillin v, and Penicillins         OBJECTIVE:        Vitals:    05/22/24 0655   BP: (!) 167/82   Pulse: 88   Resp: 18   Temp: 99 °F (37.2 °C)   SpO2: 96%              EXAM:        Pt is AAOx3, NAD    Vascular Exam:  DP and PT pulses are deminished likely due to edema. CFT <5 seconds to all digits. Skin temp is warm to warm from proximal to distal.    Neuro Exam:  Light touch sensation is intact    Dermatologic Exam: There are multiple superficial wounds noted to bilateral lower extremities R>L they appear as ruptured blisters consistent with history of lymphedema. Skin appears thickened, indurated, discolored reddish/brown. Left lower extremity does notably have more redness and swelling compared to the right lower extremity.    MSK: Deferred              Current Facility-Administered Medications   Medication Dose Route Frequency Provider Last Rate Last Admin    ALPRAZolam (XANAX) tablet 1 mg  1 mg Oral BID PRN Cody Childers MD        fluticasone (FLONASE) 50 MCG/ACT nasal spray 2 spray  2 spray Each Nostril Daily Cody Childers MD   2 spray at 05/22/24 0842    [Held by provider] furosemide (LASIX) tablet 20 mg  20 mg Oral Daily Cody Childers MD        gabapentin (NEURONTIN) capsule 300 mg  300 mg Oral TID Cody Childers MD   300 mg at 05/22/24 0841    [Held by provider] hydroCHLOROthiazide (HYDRODIURIL) tablet 25 mg  25 mg Oral QAM Cody Childers MD        pantoprazole (PROTONIX) tablet 40 mg  40 mg Oral Daily Cody Childers MD   40 mg at 05/22/24 0842    QUEtiapine (SEROQUEL) tablet 200 mg  200 mg Oral Daily Cody Childers MD        sucralfate (CARAFATE) tablet 1 g  1 g Oral TID Cody Childers MD   1 g at 05/22/24 0841    topiramate (TOPAMAX) tablet 25 mg  25 mg Oral Daily Cody Childers MD   25 mg at 05/22/24 0845    zolpidem (AMBIEN) tablet 5 mg  5 mg Oral Nightly PRN Cody Childers MD        sodium chloride flush 0.9 % injection 5-40 mL  5-40 mL IntraVENous 2 times per day Cody Childers MD

## 2024-05-23 LAB
ANION GAP SERPL CALCULATED.3IONS-SCNC: 10 MMOL/L (ref 7–16)
BASOPHILS # BLD: 0.02 K/UL (ref 0–0.2)
BASOPHILS NFR BLD: 0 % (ref 0–2)
BUN SERPL-MCNC: 5 MG/DL (ref 6–20)
CALCIUM SERPL-MCNC: 8.7 MG/DL (ref 8.6–10.2)
CHLORIDE SERPL-SCNC: 113 MMOL/L (ref 98–107)
CO2 SERPL-SCNC: 22 MMOL/L (ref 22–29)
CREAT SERPL-MCNC: 0.6 MG/DL (ref 0.5–1)
EOSINOPHIL # BLD: 0.39 K/UL (ref 0.05–0.5)
EOSINOPHILS RELATIVE PERCENT: 7 % (ref 0–6)
ERYTHROCYTE [DISTWIDTH] IN BLOOD BY AUTOMATED COUNT: 14.7 % (ref 11.5–15)
GFR, ESTIMATED: >90 ML/MIN/1.73M2
GLUCOSE SERPL-MCNC: 123 MG/DL (ref 74–99)
HCT VFR BLD AUTO: 33 % (ref 34–48)
HGB BLD-MCNC: 9.7 G/DL (ref 11.5–15.5)
IMM GRANULOCYTES # BLD AUTO: 0.07 K/UL (ref 0–0.58)
IMM GRANULOCYTES NFR BLD: 1 % (ref 0–5)
LYMPHOCYTES NFR BLD: 1.41 K/UL (ref 1.5–4)
LYMPHOCYTES RELATIVE PERCENT: 26 % (ref 20–42)
MCH RBC QN AUTO: 25.3 PG (ref 26–35)
MCHC RBC AUTO-ENTMCNC: 29.4 G/DL (ref 32–34.5)
MCV RBC AUTO: 85.9 FL (ref 80–99.9)
MICROORGANISM SPEC CULT: NO GROWTH
MONOCYTES NFR BLD: 0.4 K/UL (ref 0.1–0.95)
MONOCYTES NFR BLD: 7 % (ref 2–12)
NEUTROPHILS NFR BLD: 59 % (ref 43–80)
NEUTS SEG NFR BLD: 3.24 K/UL (ref 1.8–7.3)
PLATELET # BLD AUTO: 278 K/UL (ref 130–450)
PMV BLD AUTO: 9.5 FL (ref 7–12)
POTASSIUM SERPL-SCNC: 3.9 MMOL/L (ref 3.5–5)
RBC # BLD AUTO: 3.84 M/UL (ref 3.5–5.5)
SODIUM SERPL-SCNC: 145 MMOL/L (ref 132–146)
SPECIMEN DESCRIPTION: NORMAL
WBC OTHER # BLD: 5.5 K/UL (ref 4.5–11.5)

## 2024-05-23 PROCEDURE — 6370000000 HC RX 637 (ALT 250 FOR IP): Performed by: HOSPITALIST

## 2024-05-23 PROCEDURE — 94640 AIRWAY INHALATION TREATMENT: CPT

## 2024-05-23 PROCEDURE — 85025 COMPLETE CBC W/AUTO DIFF WBC: CPT

## 2024-05-23 PROCEDURE — 80048 BASIC METABOLIC PNL TOTAL CA: CPT

## 2024-05-23 PROCEDURE — 6360000002 HC RX W HCPCS: Performed by: HOSPITALIST

## 2024-05-23 PROCEDURE — 6360000002 HC RX W HCPCS: Performed by: NURSE PRACTITIONER

## 2024-05-23 PROCEDURE — 2580000003 HC RX 258: Performed by: HOSPITALIST

## 2024-05-23 PROCEDURE — 6360000002 HC RX W HCPCS: Performed by: INTERNAL MEDICINE

## 2024-05-23 PROCEDURE — 6370000000 HC RX 637 (ALT 250 FOR IP): Performed by: REGISTERED NURSE

## 2024-05-23 PROCEDURE — 36415 COLL VENOUS BLD VENIPUNCTURE: CPT

## 2024-05-23 PROCEDURE — 1200000000 HC SEMI PRIVATE

## 2024-05-23 RX ORDER — ENOXAPARIN SODIUM 100 MG/ML
30 INJECTION SUBCUTANEOUS 2 TIMES DAILY
Status: DISCONTINUED | OUTPATIENT
Start: 2024-05-23 | End: 2024-05-24 | Stop reason: HOSPADM

## 2024-05-23 RX ORDER — LINEZOLID 600 MG/1
600 TABLET, FILM COATED ORAL EVERY 12 HOURS SCHEDULED
Qty: 16 TABLET | Refills: 0 | Status: SHIPPED | OUTPATIENT
Start: 2024-05-23 | End: 2024-05-31

## 2024-05-23 RX ORDER — QUETIAPINE FUMARATE 100 MG/1
200 TABLET, FILM COATED ORAL DAILY PRN
Status: DISCONTINUED | OUTPATIENT
Start: 2024-05-23 | End: 2024-05-24 | Stop reason: HOSPADM

## 2024-05-23 RX ADMIN — SUCRALFATE 1 G: 1 TABLET ORAL at 09:09

## 2024-05-23 RX ADMIN — FLUTICASONE PROPIONATE 2 SPRAY: 50 SPRAY, METERED NASAL at 09:09

## 2024-05-23 RX ADMIN — SODIUM CHLORIDE, PRESERVATIVE FREE 10 ML: 5 INJECTION INTRAVENOUS at 09:12

## 2024-05-23 RX ADMIN — SUCRALFATE 1 G: 1 TABLET ORAL at 20:51

## 2024-05-23 RX ADMIN — SODIUM CHLORIDE, PRESERVATIVE FREE 10 ML: 5 INJECTION INTRAVENOUS at 09:08

## 2024-05-23 RX ADMIN — MICONAZOLE NITRATE: 20 CREAM TOPICAL at 09:10

## 2024-05-23 RX ADMIN — ENOXAPARIN SODIUM 30 MG: 100 INJECTION SUBCUTANEOUS at 09:09

## 2024-05-23 RX ADMIN — SUCRALFATE 1 G: 1 TABLET ORAL at 13:43

## 2024-05-23 RX ADMIN — BUDESONIDE 250 MCG: 0.25 SUSPENSION RESPIRATORY (INHALATION) at 18:15

## 2024-05-23 RX ADMIN — WATER 2000 MG: 1 INJECTION INTRAMUSCULAR; INTRAVENOUS; SUBCUTANEOUS at 15:59

## 2024-05-23 RX ADMIN — PANTOPRAZOLE SODIUM 40 MG: 40 TABLET, DELAYED RELEASE ORAL at 09:09

## 2024-05-23 RX ADMIN — MICONAZOLE NITRATE: 20 CREAM TOPICAL at 20:53

## 2024-05-23 RX ADMIN — ACETAMINOPHEN 650 MG: 325 TABLET ORAL at 18:12

## 2024-05-23 RX ADMIN — ALBUTEROL SULFATE 2.5 MG: 2.5 SOLUTION RESPIRATORY (INHALATION) at 18:15

## 2024-05-23 RX ADMIN — ENOXAPARIN SODIUM 30 MG: 100 INJECTION SUBCUTANEOUS at 20:51

## 2024-05-23 RX ADMIN — POTASSIUM CHLORIDE AND SODIUM CHLORIDE: 900; 150 INJECTION, SOLUTION INTRAVENOUS at 05:34

## 2024-05-23 RX ADMIN — ACETAMINOPHEN 650 MG: 325 TABLET ORAL at 10:37

## 2024-05-23 RX ADMIN — LINEZOLID 600 MG: 600 TABLET, FILM COATED ORAL at 09:09

## 2024-05-23 RX ADMIN — SODIUM CHLORIDE, PRESERVATIVE FREE 10 ML: 5 INJECTION INTRAVENOUS at 20:51

## 2024-05-23 RX ADMIN — WATER 2000 MG: 1 INJECTION INTRAMUSCULAR; INTRAVENOUS; SUBCUTANEOUS at 09:08

## 2024-05-23 RX ADMIN — BUDESONIDE 250 MCG: 0.25 SUSPENSION RESPIRATORY (INHALATION) at 06:16

## 2024-05-23 RX ADMIN — LINEZOLID 600 MG: 600 TABLET, FILM COATED ORAL at 20:51

## 2024-05-23 RX ADMIN — WATER 2000 MG: 1 INJECTION INTRAMUSCULAR; INTRAVENOUS; SUBCUTANEOUS at 00:11

## 2024-05-23 ASSESSMENT — PAIN DESCRIPTION - DESCRIPTORS
DESCRIPTORS: ACHING
DESCRIPTORS: ACHING;SORE

## 2024-05-23 ASSESSMENT — PAIN DESCRIPTION - LOCATION
LOCATION: BACK;LEG
LOCATION: BACK;LEG

## 2024-05-23 ASSESSMENT — PAIN SCALES - GENERAL
PAINLEVEL_OUTOF10: 5
PAINLEVEL_OUTOF10: 6

## 2024-05-23 ASSESSMENT — PAIN DESCRIPTION - ONSET: ONSET: GRADUAL

## 2024-05-23 ASSESSMENT — PAIN - FUNCTIONAL ASSESSMENT
PAIN_FUNCTIONAL_ASSESSMENT: ACTIVITIES ARE NOT PREVENTED
PAIN_FUNCTIONAL_ASSESSMENT: PREVENTS OR INTERFERES SOME ACTIVE ACTIVITIES AND ADLS

## 2024-05-23 ASSESSMENT — PAIN DESCRIPTION - ORIENTATION
ORIENTATION: RIGHT;LEFT
ORIENTATION: LEFT;RIGHT;POSTERIOR

## 2024-05-23 ASSESSMENT — PAIN DESCRIPTION - FREQUENCY: FREQUENCY: INTERMITTENT

## 2024-05-23 ASSESSMENT — PAIN DESCRIPTION - PAIN TYPE: TYPE: ACUTE PAIN

## 2024-05-23 NOTE — PLAN OF CARE
Problem: ABCDS Injury Assessment  Goal: Absence of physical injury  5/23/2024 0054 by Sindy Peterson, RN  Outcome: Progressing     Problem: Skin/Tissue Integrity  Goal: Absence of new skin breakdown  Description: 1.  Monitor for areas of redness and/or skin breakdown  2.  Assess vascular access sites hourly  3.  Every 4-6 hours minimum:  Change oxygen saturation probe site  4.  Every 4-6 hours:  If on nasal continuous positive airway pressure, respiratory therapy assess nares and determine need for appliance change or resting period.  5/23/2024 0054 by Sindy Peterson, RN  Outcome: Progressing     Problem: Discharge Planning  Goal: Discharge to home or other facility with appropriate resources  5/23/2024 0054 by Sindy Peterson, RN  Outcome: Progressing

## 2024-05-23 NOTE — PROGRESS NOTES
Podiatry Progress Note  5/23/2024   Tatiana Silveira       SUBJECTIVE: This is a 48 y.o. female seen bedside for bilateral lower extremity wounds. Patient states she has history of CP and chronic lymphedema. Patient states the redness on her left side has become worse recently. Patient denies n,v,f,c,d at this time. Patient has no other pedal complaints.       OBJECTIVE:      Pt is AAOx3    Vitals:    05/23/24 0744   BP: (!) 171/81   Pulse: 82   Resp: 16   Temp: 97.5 °F (36.4 °C)   SpO2: 97%      EXAM:    Vascular Exam:  DP and PT pulses are deminished likely due to edema. CFT <5 seconds to all digits. Skin temp is warm to warm from proximal to distal.     Neuro Exam:  Light touch sensation is intact     Dermatologic Exam: There are multiple superficial wounds noted to bilateral lower extremities R>L they appear as ruptured blisters consistent with history of lymphedema. Skin appears thickened, indurated, discolored reddish/brown. Left lower extremity does notably have more redness and swelling compared to the right lower extremity.     MSK: Deferred             Current Facility-Administered Medications   Medication Dose Route Frequency Provider Last Rate Last Admin    enoxaparin Sodium (LOVENOX) injection 30 mg  30 mg SubCUTAneous BID Leisa Martinez APRN - CNP   30 mg at 05/23/24 0909    QUEtiapine (SEROQUEL) tablet 200 mg  200 mg Oral Daily PRN Bright Colin DO        ALPRAZolam (XANAX) tablet 1 mg  1 mg Oral BID PRN Cody Childers MD        fluticasone (FLONASE) 50 MCG/ACT nasal spray 2 spray  2 spray Each Nostril Daily Cody Childers MD   2 spray at 05/23/24 0909    furosemide (LASIX) tablet 20 mg  20 mg Oral Daily Cody Childers MD        gabapentin (NEURONTIN) capsule 300 mg  300 mg Oral TID Cody Childers MD   300 mg at 05/22/24 2200    hydroCHLOROthiazide (HYDRODIURIL) tablet 25 mg  25 mg Oral QAM Cdoy Childers MD        pantoprazole (PROTONIX) tablet 40 mg  40 mg Oral Daily Cody Childers MD   40 mg at

## 2024-05-23 NOTE — PROGRESS NOTES
Internal Medicine Progress Note    Patient's name: Tatiana Silveira  : 1975  Chief complaints (on day of admission): Wound Check (Cellulitis moving up into thigh today was at knee yesterday) and Fever ()  Admission date: 2024  Date of service: 2024   Room: [address] SURG  Primary care physician: Keo Wagner DO  Reason for visit: Follow-up for cellulitis    Subjective  Tatiana is seen sitting up in bed awake and alert in no distress.  She does report feeling somewhat groggy, attributes this to the Seroquel that she had last night.  She reports that generally she takes the Seroquel as needed at home rather than every day.  She denies any fever or chills.  Denies any nausea or vomiting.  She reports that her legs are \"achy\" but does feel like the swelling is better.  No other issues or concerns from nursing.    Review of Systems  Full 10 point review of systems negative unless mentioned above.    Hospital Medications  Current Facility-Administered Medications   Medication Dose Route Frequency Provider Last Rate Last Admin    enoxaparin Sodium (LOVENOX) injection 30 mg  30 mg SubCUTAneous BID Leisa Martinez, KIKI - CNP   30 mg at 24 09    QUEtiapine (SEROQUEL) tablet 200 mg  200 mg Oral Daily PRN Bright Colin DO        ALPRAZolam (XANAX) tablet 1 mg  1 mg Oral BID PRN Cody Childers MD        fluticasone (FLONASE) 50 MCG/ACT nasal spray 2 spray  2 spray Each Nostril Daily Cody Childers MD   2 spray at 24 09    [Held by provider] furosemide (LASIX) tablet 20 mg  20 mg Oral Daily Cody Childers MD        gabapentin (NEURONTIN) capsule 300 mg  300 mg Oral TID Cody Childers MD   300 mg at 24 2200    [Held by provider] hydroCHLOROthiazide (HYDRODIURIL) tablet 25 mg  25 mg Oral QAM Cody Childers MD        pantoprazole (PROTONIX) tablet 40 mg  40 mg Oral Daily Cody Childers MD   40 mg at 24 09    sucralfate (CARAFATE) tablet 1 g  1 g Oral TID Cody Childers MD   1 g

## 2024-05-23 NOTE — PROGRESS NOTES
[facility]  [facility]  Progress Note      Chief Complaint   Patient presents with    Wound Check     Cellulitis moving up into thigh today was at knee yesterday    Fever              SUBJECTIVE:  Patient is tolerating medications. No reported adverse drug reactions.  No nausea, vomiting, diarrhea.  Leg starting to feel better    Review of systems:  As stated above in the chief complaint, otherwise negative.    Medications:  Scheduled Meds:   enoxaparin  30 mg SubCUTAneous BID    fluticasone  2 spray Each Nostril Daily    [Held by provider] furosemide  20 mg Oral Daily    gabapentin  300 mg Oral TID    [Held by provider] hydroCHLOROthiazide  25 mg Oral QAM    pantoprazole  40 mg Oral Daily    QUEtiapine  200 mg Oral Daily    sucralfate  1 g Oral TID    topiramate  25 mg Oral Daily    sodium chloride flush  5-40 mL IntraVENous 2 times per day    budesonide  0.25 mg Nebulization BID RT    miconazole   Topical BID    linezolid  600 mg Oral 2 times per day    sodium chloride flush  5-40 mL IntraVENous 2 times per day    ceFAZolin  2,000 mg IntraVENous Q8H     Continuous Infusions:   sodium chloride      0.9% NaCl with KCl 20 mEq 100 mL/hr at 24 0534    sodium chloride       PRN Meds:ALPRAZolam, zolpidem, sodium chloride flush, sodium chloride, potassium chloride **OR** potassium alternative oral replacement **OR** potassium chloride, magnesium sulfate, polyethylene glycol, acetaminophen **OR** acetaminophen, albuterol, sodium chloride flush, sodium chloride, ondansetron **OR** ondansetron    OBJECTIVE:  BP (!) 171/81   Pulse 82   Temp 97.5 °F (36.4 °C) (Oral)   Resp 16   Ht 1.727 m (5' 8\")   Wt 118.7 kg (261 lb 11.2 oz)   SpO2 97%   BMI 39.79 kg/m²   Temp  Av.8 °F (36.6 °C)  Min: 97.5 °F (36.4 °C)  Max: 98.3 °F (36.8 °C)  Constitutional: The patient is awake, alert, and oriented.   Skin: Warm and dry. No rashes were noted.   HEENT: Round and reactive pupils.  Moist  mucous membranes.  No ulcerations or thrush.  Neck: Supple to movements.   Chest: No use of accessory muscles to breathe. Symmetrical expansion.  No wheezing, crackles or rhonchi.  Cardiovascular: S1 and S2 are rhythmic and regular. No murmurs appreciated.   Abdomen: Positive bowel sounds to auscultation. Benign to palpation. No masses felt. No hepatosplenomegaly.  Extremities: No clubbing, no cyanosis, + lymphedema, legs wrapped with ace  Lines: peripheral    Laboratory and Tests Review:  Lab Results   Component Value Date    WBC 5.5 05/23/2024    WBC 7.7 05/22/2024    WBC 9.8 05/21/2024    HGB 9.7 (L) 05/23/2024    HCT 33.0 (L) 05/23/2024    MCV 85.9 05/23/2024     05/23/2024     Lab Results   Component Value Date    NEUTROABS 3.24 05/23/2024    NEUTROABS 5.84 05/22/2024    NEUTROABS 8.01 (H) 05/21/2024     No results found for: \"CRPHS\"  Lab Results   Component Value Date    ALT 8 05/21/2024    AST 13 05/21/2024    ALKPHOS 79 05/21/2024    BILITOT 0.5 05/21/2024     Lab Results   Component Value Date/Time     05/23/2024 05:35 AM    K 3.9 05/23/2024 05:35 AM     05/23/2024 05:35 AM    CO2 22 05/23/2024 05:35 AM    BUN 5 05/23/2024 05:35 AM    CREATININE 0.6 05/23/2024 05:35 AM    CREATININE 0.6 05/22/2024 09:18 AM    CREATININE 0.7 05/21/2024 03:10 PM    GFRAA >60 12/06/2021 02:57 PM    AGRATIO 1.1 09/16/2022 11:04 AM    LABGLOM >90 05/23/2024 05:35 AM    GLUCOSE 123 05/23/2024 05:35 AM    CALCIUM 8.7 05/23/2024 05:35 AM    BILITOT 0.5 05/21/2024 03:10 PM    ALKPHOS 79 05/21/2024 03:10 PM    AST 13 05/21/2024 03:10 PM    ALT 8 05/21/2024 03:10 PM     Lab Results   Component Value Date    .0 (H) 05/21/2024    CRP 9.6 06/01/2022     Lab Results   Component Value Date    SEDRATE 53 (H) 05/21/2024    SEDRATE 35 (H) 06/01/2022     Radiology:    Blood cultures 5/21: negative so far  Wound culture (right leg): GNR and GPO      Assessment:  Left leg cellulitis  Fever associated to the above  Tinea

## 2024-05-23 NOTE — PATIENT CARE CONFERENCE
[facility] Quality Flow/Interdisciplinary Rounds Progress Note        Quality Flow Rounds held on May 23, 2024    Disciplines Attending:  Bedside Nurse, , , and Nursing Unit Leadership    Tatiana Silveira was admitted on 5/21/2024  2:26 PM    Anticipated Discharge Date:       Disposition:    Reynaldo Score:  Reynaldo Scale Score: 15    Readmission Risk              Risk of Unplanned Readmission:  16           Discussed patient goal for the day, patient clinical progression, and barriers to discharge.  The following Goal(s) of the Day/Commitment(s) have been identified:  Diagnostics - Report Results      Lazara Shahid RN  May 23, 2024

## 2024-05-23 NOTE — PROGRESS NOTES
DVT Prophylaxis Adjustment Policy (DVT Prophylaxis)     This patient is on DVT Prophylaxis medication that requires a dose adjustment      Date Body Weight IBW  Adjusted BW SCr  CrCl Dialysis status   5/23/2024 118.7 kg (261 lb 11.2 oz) Ideal body weight: 63.9 kg (140 lb 14 oz)  Adjusted ideal body weight: 85.8 kg (189 lb 3.3 oz) Serum creatinine: 0.6 mg/dL 05/22/24 0918  Estimated creatinine clearance: 155 mL/min N/a       Pharmacy has dose-adjusted the DVT Prophylaxis regimen to match   the recommendations from the following table        Ordered Medication:Lovenox 40mg daily    Order Changed/converted to: Lovenox 30mg BID      These changes were made per protocol according to the [facility] Pharmacist   Review for Appropriate Use and Automatic Dose Adjustments of   Subcutaneous Anticoagulants Policy     *Please note this dose may need readjusted if patient's condition changes.    Please contact pharmacy with any questions regarding these changes.    kiran peña RPH  5/23/2024  6:10 AM

## 2024-05-23 NOTE — CARE COORDINATION
Updated plan of care. Patient is from home with her wife and children. Patient states she has all necessary equipment for home. Patient is needing wound care. [facility] called and a referral was made, they are following. Podiatry messaged for wound care orders. Will need to Update [facility] order once received. Patient states her wife or daughter will provide transport at discharge. ID following, currently on PO zyvox and  IV ancef. Await plan.   Electronically signed by Kelly Fischer RN on 5/23/2024 at 10:08 AM

## 2024-05-24 VITALS
HEART RATE: 85 BPM | RESPIRATION RATE: 16 BRPM | SYSTOLIC BLOOD PRESSURE: 180 MMHG | DIASTOLIC BLOOD PRESSURE: 101 MMHG | HEIGHT: 68 IN | TEMPERATURE: 97.6 F | WEIGHT: 266.7 LBS | OXYGEN SATURATION: 98 % | BODY MASS INDEX: 40.42 KG/M2

## 2024-05-24 LAB
ANION GAP SERPL CALCULATED.3IONS-SCNC: 10 MMOL/L (ref 7–16)
BASOPHILS # BLD: 0.02 K/UL (ref 0–0.2)
BASOPHILS NFR BLD: 0 % (ref 0–2)
BUN SERPL-MCNC: 6 MG/DL (ref 6–20)
CALCIUM SERPL-MCNC: 8.9 MG/DL (ref 8.6–10.2)
CHLORIDE SERPL-SCNC: 109 MMOL/L (ref 98–107)
CO2 SERPL-SCNC: 23 MMOL/L (ref 22–29)
CREAT SERPL-MCNC: 0.5 MG/DL (ref 0.5–1)
EOSINOPHIL # BLD: 0.56 K/UL (ref 0.05–0.5)
EOSINOPHILS RELATIVE PERCENT: 9 % (ref 0–6)
ERYTHROCYTE [DISTWIDTH] IN BLOOD BY AUTOMATED COUNT: 14.4 % (ref 11.5–15)
GFR, ESTIMATED: >90 ML/MIN/1.73M2
GLUCOSE SERPL-MCNC: 123 MG/DL (ref 74–99)
HCT VFR BLD AUTO: 31.5 % (ref 34–48)
HGB BLD-MCNC: 9.3 G/DL (ref 11.5–15.5)
IMM GRANULOCYTES # BLD AUTO: 0.07 K/UL (ref 0–0.58)
IMM GRANULOCYTES NFR BLD: 1 % (ref 0–5)
LYMPHOCYTES NFR BLD: 1.57 K/UL (ref 1.5–4)
LYMPHOCYTES RELATIVE PERCENT: 25 % (ref 20–42)
MCH RBC QN AUTO: 25 PG (ref 26–35)
MCHC RBC AUTO-ENTMCNC: 29.5 G/DL (ref 32–34.5)
MCV RBC AUTO: 84.7 FL (ref 80–99.9)
MICROORGANISM SPEC CULT: NORMAL
MONOCYTES NFR BLD: 0.4 K/UL (ref 0.1–0.95)
MONOCYTES NFR BLD: 6 % (ref 2–12)
NEUTROPHILS NFR BLD: 59 % (ref 43–80)
NEUTS SEG NFR BLD: 3.75 K/UL (ref 1.8–7.3)
PLATELET # BLD AUTO: 311 K/UL (ref 130–450)
PMV BLD AUTO: 9.5 FL (ref 7–12)
POTASSIUM SERPL-SCNC: 3.9 MMOL/L (ref 3.5–5)
RBC # BLD AUTO: 3.72 M/UL (ref 3.5–5.5)
SODIUM SERPL-SCNC: 142 MMOL/L (ref 132–146)
SPECIMEN DESCRIPTION: NORMAL
WBC OTHER # BLD: 6.4 K/UL (ref 4.5–11.5)

## 2024-05-24 PROCEDURE — 80048 BASIC METABOLIC PNL TOTAL CA: CPT

## 2024-05-24 PROCEDURE — 6360000002 HC RX W HCPCS: Performed by: HOSPITALIST

## 2024-05-24 PROCEDURE — 97530 THERAPEUTIC ACTIVITIES: CPT

## 2024-05-24 PROCEDURE — 97165 OT EVAL LOW COMPLEX 30 MIN: CPT

## 2024-05-24 PROCEDURE — 85025 COMPLETE CBC W/AUTO DIFF WBC: CPT

## 2024-05-24 PROCEDURE — 97161 PT EVAL LOW COMPLEX 20 MIN: CPT

## 2024-05-24 PROCEDURE — 2580000003 HC RX 258: Performed by: HOSPITALIST

## 2024-05-24 PROCEDURE — 6360000002 HC RX W HCPCS: Performed by: NURSE PRACTITIONER

## 2024-05-24 PROCEDURE — 6370000000 HC RX 637 (ALT 250 FOR IP): Performed by: HOSPITALIST

## 2024-05-24 PROCEDURE — 36415 COLL VENOUS BLD VENIPUNCTURE: CPT

## 2024-05-24 PROCEDURE — 6370000000 HC RX 637 (ALT 250 FOR IP): Performed by: REGISTERED NURSE

## 2024-05-24 PROCEDURE — 94640 AIRWAY INHALATION TREATMENT: CPT

## 2024-05-24 RX ORDER — FUROSEMIDE 20 MG/1
20 TABLET ORAL EVERY OTHER DAY
Qty: 30 TABLET | Refills: 0 | Status: SHIPPED
Start: 2024-05-24

## 2024-05-24 RX ORDER — QUETIAPINE FUMARATE 200 MG/1
100 TABLET, FILM COATED ORAL DAILY PRN
Qty: 30 TABLET | Refills: 0 | Status: SHIPPED
Start: 2024-05-24

## 2024-05-24 RX ADMIN — MICONAZOLE NITRATE: 20 CREAM TOPICAL at 08:28

## 2024-05-24 RX ADMIN — SODIUM CHLORIDE, PRESERVATIVE FREE 10 ML: 5 INJECTION INTRAVENOUS at 08:28

## 2024-05-24 RX ADMIN — WATER 2000 MG: 1 INJECTION INTRAMUSCULAR; INTRAVENOUS; SUBCUTANEOUS at 00:49

## 2024-05-24 RX ADMIN — SUCRALFATE 1 G: 1 TABLET ORAL at 08:27

## 2024-05-24 RX ADMIN — PANTOPRAZOLE SODIUM 40 MG: 40 TABLET, DELAYED RELEASE ORAL at 08:26

## 2024-05-24 RX ADMIN — ACETAMINOPHEN 650 MG: 325 TABLET ORAL at 00:53

## 2024-05-24 RX ADMIN — FUROSEMIDE 20 MG: 20 TABLET ORAL at 08:27

## 2024-05-24 RX ADMIN — TOPIRAMATE 25 MG: 25 TABLET, FILM COATED ORAL at 08:27

## 2024-05-24 RX ADMIN — SUCRALFATE 1 G: 1 TABLET ORAL at 14:55

## 2024-05-24 RX ADMIN — LINEZOLID 600 MG: 600 TABLET, FILM COATED ORAL at 08:27

## 2024-05-24 RX ADMIN — HYDROCHLOROTHIAZIDE 25 MG: 25 TABLET ORAL at 08:27

## 2024-05-24 RX ADMIN — FLUTICASONE PROPIONATE 2 SPRAY: 50 SPRAY, METERED NASAL at 08:28

## 2024-05-24 RX ADMIN — ENOXAPARIN SODIUM 30 MG: 100 INJECTION SUBCUTANEOUS at 08:27

## 2024-05-24 RX ADMIN — BUDESONIDE 250 MCG: 0.25 SUSPENSION RESPIRATORY (INHALATION) at 08:00

## 2024-05-24 RX ADMIN — ALBUTEROL SULFATE 2.5 MG: 2.5 SOLUTION RESPIRATORY (INHALATION) at 08:01

## 2024-05-24 RX ADMIN — WATER 2000 MG: 1 INJECTION INTRAMUSCULAR; INTRAVENOUS; SUBCUTANEOUS at 08:33

## 2024-05-24 RX ADMIN — ACETAMINOPHEN 650 MG: 325 TABLET ORAL at 08:33

## 2024-05-24 ASSESSMENT — PAIN DESCRIPTION - DESCRIPTORS
DESCRIPTORS: THROBBING
DESCRIPTORS: ACHING

## 2024-05-24 ASSESSMENT — PAIN - FUNCTIONAL ASSESSMENT
PAIN_FUNCTIONAL_ASSESSMENT: PREVENTS OR INTERFERES SOME ACTIVE ACTIVITIES AND ADLS
PAIN_FUNCTIONAL_ASSESSMENT: PREVENTS OR INTERFERES SOME ACTIVE ACTIVITIES AND ADLS

## 2024-05-24 ASSESSMENT — PAIN DESCRIPTION - ONSET: ONSET: ON-GOING

## 2024-05-24 ASSESSMENT — PAIN DESCRIPTION - ORIENTATION
ORIENTATION: RIGHT;LEFT
ORIENTATION: LEFT;RIGHT

## 2024-05-24 ASSESSMENT — PAIN DESCRIPTION - PAIN TYPE: TYPE: ACUTE PAIN

## 2024-05-24 ASSESSMENT — PAIN DESCRIPTION - FREQUENCY: FREQUENCY: CONTINUOUS

## 2024-05-24 ASSESSMENT — PAIN SCALES - GENERAL
PAINLEVEL_OUTOF10: 3
PAINLEVEL_OUTOF10: 6

## 2024-05-24 ASSESSMENT — PAIN DESCRIPTION - LOCATION
LOCATION: LEG
LOCATION: LEG

## 2024-05-24 NOTE — PROGRESS NOTES
Occupational Therapy  OCCUPATIONAL THERAPY INITIAL EVALUATION    [facility]        Date:2024                                                Patient Name: Tatiana Silveira  MRN: [id number]  : 1975  Room: [address] [place] [address]    Evaluating OT:Camille Lee OTR/L #[id number]    Referring Provider: Cody Childers MD   Specific Provider Orders/Date: OT eval and treat 24     Diagnosis: Hypokalemia [E87.6]  Lymph edema [I89.0]  Open wound, lower leg, right, initial encounter [S81.801A]  Cellulitis of both lower extremities [L03.115, L03.116]  Cellulitis of lower extremity, unspecified laterality [L03.119]   Pt admitted to hospital on 24 for L LE wound check and fever    Pertinent Medical History:  has a past medical history of Cerebral palsy (HCC), Cobalamin deficiency, Depressive disorder, Iron deficiency anemia, Lymphedema, and Obesity.       Precautions:  Fall Risk, h/o CP, B LE lymphedema, Purewick    Assessment of current deficits    [x] Functional mobility  [x]ADLs  [x] Strength               []Cognition    [x] Functional transfers   [x] IADLs         [x] Safety Awareness   [x]Endurance    [] Fine Coordination              [x] Balance      [] Vision/perception   []Sensation     []Gross Motor Coordination  [] ROM  [] Delirium                   [] Motor Control     OT PLAN OF CARE   OT POC based on physician orders, patient diagnosis and results of clinical assessment    Frequency/Duration 1-3 days/wk for 2 weeks PRN   Specific OT Treatment Interventions to include:   * Instruction/training on adapted ADL techniques and AE recommendations to increase functional independence within precautions       * Training on energy conservation strategies, correct breathing pattern and techniques to improve independence/tolerance for self-care routine  * Functional transfer/mobility training/DME recommendations for increased independence, safety, and fall prevention  *  Patient/Family education to increase follow through with safety techniques and functional independence  * Recommendation of environmental modifications for increased safety with functional transfers/mobility and ADLs  * Therapeutic exercise to improve motor endurance, ROM, and functional strength for ADLs/functional transfers  * Therapeutic activities to facilitate/challenge dynamic balance, stand tolerance for increased safety and independence with ADLs  * Therapeutic activities to facilitate gross/fine motor skills for increased independence with ADLs  * Positioning to improve skin integrity, interaction with environment and functional independence    Recommended Adaptive Equipment: BSC, TBD for additional AE    Home Living: Pt lives with spouse and 2 daughters in 1 floor home. Ramped entry   Bathroom setup: walk in shower with shower chair and grab bars   Equipment owned: manual w/c, power w/c, shower chair. Per pt, friend giving pt aspen lift    Prior Level of Function: Mod I with UB ADLs, (majority completed at w/c level), assist PRN with LB ADL's, shares IADLs; non-ambulatory, completes low pivot transfers><power w/c w/ assist PRN   Driving: no    Pain Level: Pt c/o 7/10 chronic back and B LE pain this session ; reinforced pain management strategies    Cognition: A&O: 4/4; Follows 1-2 step directions   Memory:  good    Sequencing:  good    Problem solving:  fair    Judgement/safety:  fair      Functional Assessment:  AM-PAC Daily Activity Raw Score: 15/24   Initial Eval Status  Date: 5/24/21 Treatment Status  Date: STGs = LTGs  Time frame: 10-14 days   Feeding Independent      Grooming Supervision   Seated position  Modified George    UB Dressing Minimal Assist   Modified George    LB Dressing Dependent   Minimal Assist    Bathing Moderate Assist  Minimal Assist    Toileting Maximal Assist   Purewick  Minimal Assist    Bed Mobility  NT   Pt seated in chair at room entry and departure  Supine to

## 2024-05-24 NOTE — PLAN OF CARE
Problem: ABCDS Injury Assessment  Goal: Absence of physical injury  5/24/2024 1130 by Opal Mai, RN  Outcome: Progressing     Problem: Skin/Tissue Integrity  Goal: Absence of new skin breakdown  Description: 1.  Monitor for areas of redness and/or skin breakdown  2.  Assess vascular access sites hourly  3.  Every 4-6 hours minimum:  Change oxygen saturation probe site  4.  Every 4-6 hours:  If on nasal continuous positive airway pressure, respiratory therapy assess nares and determine need for appliance change or resting period.  5/24/2024 1130 by Opal Mai, RN  Outcome: Progressing     Problem: Discharge Planning  Goal: Discharge to home or other facility with appropriate resources  5/24/2024 1130 by Opal Mai, RN  Outcome: Progressing     Problem: Pain  Goal: Verbalizes/displays adequate comfort level or baseline comfort level  5/24/2024 1130 by Opal Mai, RN  Outcome: Progressing     Problem: Safety - Adult  Goal: Free from fall injury  5/24/2024 1130 by Opal Mai, RN  Outcome: Progressing

## 2024-05-24 NOTE — PROGRESS NOTES
Patient shared that she is being discharged. Patient seemed apprehensive in discussing her going home because her legs are wrapped and she will not be able to take a shower. Patient also seems worried that she will have to come back to the hospital if/when her infection returns. Patient shared about the blessings in her life of having a  and children, now grandchildren.

## 2024-05-24 NOTE — PROGRESS NOTES
Internal Medicine Progress Note    Patient's name: Tatiana Silveira  : 1975  Chief complaints (on day of admission): Wound Check (Cellulitis moving up into thigh today was at knee yesterday) and Fever ()  Admission date: 2024  Date of service: 2024   Room: [address]  Primary care physician: Keo Wagner DO  Reason for visit: Follow-up for cellulitis    Subjective  Tatiana was seen and examined.  She is resting comfortably in bed in no acute distress.  She has dressings around both of her legs.  She states she is feeling better today because she did not have her Seroquel yesterday.  She has no new problems or complaints.  She does tell me she is slightly concerned about being able to pivot when she goes home.  Per ID, patient is to get 10 days of linezolid on discharge.    Review of Systems  There are no new complaints of chest pain, shortness of breath, abdominal pain, nausea, vomiting, diarrhea, constipation.    Hospital Medications  Current Facility-Administered Medications   Medication Dose Route Frequency Provider Last Rate Last Admin    enoxaparin Sodium (LOVENOX) injection 30 mg  30 mg SubCUTAneous BID Leisa Martinez, KIKI - CNP   30 mg at 24    QUEtiapine (SEROQUEL) tablet 200 mg  200 mg Oral Daily PRN Bright Colin DO        ALPRAZolam (XANAX) tablet 1 mg  1 mg Oral BID PRN Cody Childers MD        fluticasone (FLONASE) 50 MCG/ACT nasal spray 2 spray  2 spray Each Nostril Daily Cody Childers MD   2 spray at 24    furosemide (LASIX) tablet 20 mg  20 mg Oral Daily Cody Childers MD        gabapentin (NEURONTIN) capsule 300 mg  300 mg Oral TID Cody Childers MD   300 mg at 24    hydroCHLOROthiazide (HYDRODIURIL) tablet 25 mg  25 mg Oral QAM Cody Childers MD        pantoprazole (PROTONIX) tablet 40 mg  40 mg Oral Daily Cody Childers MD   40 mg at 24 09    sucralfate (CARAFATE) tablet 1 g  1 g Oral TID Cody Childers MD   1 g at 24  2051    topiramate (TOPAMAX) tablet 25 mg  25 mg Oral Daily Cody Childers MD   25 mg at 05/22/24 0845    zolpidem (AMBIEN) tablet 5 mg  5 mg Oral Nightly PRN Coyd Childers MD        sodium chloride flush 0.9 % injection 5-40 mL  5-40 mL IntraVENous 2 times per day Cody Childers MD   10 mL at 05/23/24 2051    sodium chloride flush 0.9 % injection 5-40 mL  5-40 mL IntraVENous PRN Cody Childers MD        0.9 % sodium chloride infusion   IntraVENous PRN Cody Childers MD        potassium chloride (KLOR-CON M) extended release tablet 40 mEq  40 mEq Oral PRN Cody Childers MD        Or    potassium bicarb-citric acid (EFFER-K) effervescent tablet 40 mEq  40 mEq Oral PRN Cody Childers MD        Or    potassium chloride 10 mEq/100 mL IVPB (Peripheral Line)  10 mEq IntraVENous PRN Cody Childers MD        magnesium sulfate 2000 mg in 50 mL IVPB premix  2,000 mg IntraVENous PRN Cody Childers MD        polyethylene glycol (GLYCOLAX) packet 17 g  17 g Oral Daily PRN Cody Childers MD        acetaminophen (TYLENOL) tablet 650 mg  650 mg Oral Q6H PRN Cody Childers MD   650 mg at 05/24/24 0053    Or    acetaminophen (TYLENOL) suppository 650 mg  650 mg Rectal Q6H PRN Cody Childers MD        albuterol (PROVENTIL) (2.5 MG/3ML) 0.083% nebulizer solution 2.5 mg  2.5 mg Nebulization Q6H PRN Cody Childers MD   2.5 mg at 05/23/24 1815    budesonide (PULMICORT) nebulizer suspension 250 mcg  0.25 mg Nebulization BID RT Cody Childers MD   250 mcg at 05/23/24 1815    miconazole (MICOTIN) 2 % cream   Topical BID Amisha Hebert APRN - CNP   Given at 05/23/24 2053    linezolid (ZYVOX) tablet 600 mg  600 mg Oral 2 times per day Amisha Hebert APRN - CNP   600 mg at 05/23/24 2051    sodium chloride flush 0.9 % injection 5-40 mL  5-40 mL IntraVENous 2 times per day Cody Childers MD   10 mL at 05/23/24 0912    sodium chloride flush 0.9 % injection 5-40 mL  5-40 mL IntraVENous PRN Cody Childers MD        0.9 % sodium chloride infusion   IntraVENous PRN

## 2024-05-24 NOTE — CARE COORDINATION
Updated plan of care. Patient is from home with her wife and children. Patient states she has all necessary equipment for home. Patient is needing wound care. [facility] called and a referral was made, they are following. Home care orders placed. Patient states her wife or daughter will provide transport at discharge. ID following, currently on PO zyvox, script signed. Will need prior auth. Prior auth completed through [facility]. Ref # [id number].   Electronically signed by Kelly Fischer RN on 5/24/2024 at 10:54 AM

## 2024-05-24 NOTE — PATIENT CARE CONFERENCE
P Quality Flow/Interdisciplinary Rounds Progress Note        Quality Flow Rounds held on May 24, 2024    Disciplines Attending:  Bedside Nurse, , , and Nursing Unit Leadership    Tatiana Silveira was admitted on 5/21/2024  2:26 PM    Anticipated Discharge Date:       Disposition:    Reynaldo Score:  Reynaldo Scale Score: 15    Readmission Risk              Risk of Unplanned Readmission:  16           Discussed patient goal for the day, patient clinical progression, and barriers to discharge.  The following Goal(s) of the Day/Commitment(s) have been identified:  Discharge - Obtain Order      Urvashi Bolanos RN  May 24, 2024

## 2024-05-24 NOTE — PROGRESS NOTES
Podiatry Progress Note  5/24/2024   Tatiana Silveira       SUBJECTIVE: This is a 48 y.o. female seen bedside for bilateral lower extremity wounds. Patient states she has history of CP and chronic lymphedema. Patient states the redness on her left side has become worse recently. Patient denies n,v,f,c,d at this time. Patient has no other pedal complaints.       OBJECTIVE:      Pt is AAOx3    Vitals:    05/24/24 0757   BP: (!) 180/101   Pulse: 85   Resp: 16   Temp: 97.6 °F (36.4 °C)   SpO2: 98%      EXAM:    Vascular Exam:  DP and PT pulses are deminished likely due to edema. CFT <5 seconds to all digits. Skin temp is warm to warm from proximal to distal.     Neuro Exam:  Light touch sensation is intact     Dermatologic Exam: There are multiple superficial wounds noted to bilateral lower extremities R>L they appear as ruptured blisters consistent with history of lymphedema. Skin appears thickened, indurated, discolored reddish/brown. Redness and swelling appears significantly improved compared to admission.    MSK: Deferred    Current Facility-Administered Medications   Medication Dose Route Frequency Provider Last Rate Last Admin    enoxaparin Sodium (LOVENOX) injection 30 mg  30 mg SubCUTAneous BID Leisa Martinez APRN - CNP   30 mg at 05/24/24 0827    QUEtiapine (SEROQUEL) tablet 200 mg  200 mg Oral Daily PRN Bright Colin DO        ALPRAZolam (XANAX) tablet 1 mg  1 mg Oral BID PRN Cody Childers MD        fluticasone (FLONASE) 50 MCG/ACT nasal spray 2 spray  2 spray Each Nostril Daily Cody Childers MD   2 spray at 05/24/24 0828    furosemide (LASIX) tablet 20 mg  20 mg Oral Daily Cody Childers MD   20 mg at 05/24/24 0827    gabapentin (NEURONTIN) capsule 300 mg  300 mg Oral TID Cody Childers MD   300 mg at 05/22/24 2200    hydroCHLOROthiazide (HYDRODIURIL) tablet 25 mg  25 mg Oral QAM Cody Childers MD   25 mg at 05/24/24 0827    pantoprazole (PROTONIX) tablet 40 mg  40 mg Oral Daily Cody Childers MD   40 mg at  05/24/24 0826    sucralfate (CARAFATE) tablet 1 g  1 g Oral TID Cody Childers MD   1 g at 05/24/24 0827    topiramate (TOPAMAX) tablet 25 mg  25 mg Oral Daily Cody Childers MD   25 mg at 05/24/24 0827    zolpidem (AMBIEN) tablet 5 mg  5 mg Oral Nightly PRN Cody Childers MD        sodium chloride flush 0.9 % injection 5-40 mL  5-40 mL IntraVENous 2 times per day Cody Childers MD   10 mL at 05/24/24 0828    sodium chloride flush 0.9 % injection 5-40 mL  5-40 mL IntraVENous PRN Cody Childers MD        0.9 % sodium chloride infusion   IntraVENous PRN Cody Childers MD        potassium chloride (KLOR-CON M) extended release tablet 40 mEq  40 mEq Oral PRN Cody Childers MD        Or    potassium bicarb-citric acid (EFFER-K) effervescent tablet 40 mEq  40 mEq Oral PRN Cody Childers MD        Or    potassium chloride 10 mEq/100 mL IVPB (Peripheral Line)  10 mEq IntraVENous PRN Cody Childers MD        magnesium sulfate 2000 mg in 50 mL IVPB premix  2,000 mg IntraVENous PRN Cody Childers MD        polyethylene glycol (GLYCOLAX) packet 17 g  17 g Oral Daily PRN Cody Childers MD        acetaminophen (TYLENOL) tablet 650 mg  650 mg Oral Q6H PRN Cody Childers MD   650 mg at 05/24/24 0833    Or    acetaminophen (TYLENOL) suppository 650 mg  650 mg Rectal Q6H PRN Cody Childers MD        albuterol (PROVENTIL) (2.5 MG/3ML) 0.083% nebulizer solution 2.5 mg  2.5 mg Nebulization Q6H PRN Cody Childers MD   2.5 mg at 05/24/24 0801    budesonide (PULMICORT) nebulizer suspension 250 mcg  0.25 mg Nebulization BID RT Cody Childers MD   250 mcg at 05/24/24 0800    miconazole (MICOTIN) 2 % cream   Topical BID Amisha Hebert, KIKI - CNP   Given at 05/24/24 0828    linezolid (ZYVOX) tablet 600 mg  600 mg Oral 2 times per day Amisha Hebert APRN - CNP   600 mg at 05/24/24 0827    sodium chloride flush 0.9 % injection 5-40 mL  5-40 mL IntraVENous 2 times per day Cody Childers MD   10 mL at 05/23/24 0912    sodium chloride flush 0.9 % injection 5-40 mL

## 2024-05-24 NOTE — PROGRESS NOTES
[facility]  MARISSA  Progress Note      Chief Complaint   Patient presents with    Wound Check     Cellulitis moving up into thigh today was at knee yesterday    Fever              SUBJECTIVE:  Patient is tolerating medications. No reported adverse drug reactions.  Eating lunch - says she is feeling \"queasy\" - unsure if its the antibiotic or her regular meds as those often cause her to feel this way too.   No fevers  Left leg is much less erythematous  No fevers    Review of systems:  As stated above in the chief complaint, otherwise negative.    Medications:  Scheduled Meds:   enoxaparin  30 mg SubCUTAneous BID    fluticasone  2 spray Each Nostril Daily    furosemide  20 mg Oral Daily    gabapentin  300 mg Oral TID    hydroCHLOROthiazide  25 mg Oral QAM    pantoprazole  40 mg Oral Daily    sucralfate  1 g Oral TID    topiramate  25 mg Oral Daily    sodium chloride flush  5-40 mL IntraVENous 2 times per day    budesonide  0.25 mg Nebulization BID RT    miconazole   Topical BID    linezolid  600 mg Oral 2 times per day    sodium chloride flush  5-40 mL IntraVENous 2 times per day    ceFAZolin  2,000 mg IntraVENous Q8H     Continuous Infusions:   sodium chloride      sodium chloride       PRN Meds:QUEtiapine, ALPRAZolam, zolpidem, sodium chloride flush, sodium chloride, potassium chloride **OR** potassium alternative oral replacement **OR** potassium chloride, magnesium sulfate, polyethylene glycol, acetaminophen **OR** acetaminophen, albuterol, sodium chloride flush, sodium chloride, ondansetron **OR** ondansetron    OBJECTIVE:  BP (!) 180/101   Pulse 85   Temp 97.6 °F (36.4 °C) (Oral)   Resp 16   Ht 1.727 m (5' 8\")   Wt 121 kg (266 lb 11.2 oz)   SpO2 98%   BMI 40.55 kg/m²   Temp  Av.8 °F (36.6 °C)  Min: 97.6 °F (36.4 °C)  Max: 97.9 °F (36.6 °C)  Constitutional: The patient is awake, alert, and oriented. Sitting up in bed, eating lunch.   Skin: Warm and dry. No rashes

## 2024-05-24 NOTE — DISCHARGE SUMMARY
Internal Medicine Discharge Summary    NAME: Tatiana Silveira :  1975  MRN:  [id number] PCP:Keo Wagner DO    ADMITTED: 2024   DISCHARGED: 2024  3:58 PM    ADMITTING PHYSICIAN: Bright Colin DO    PCP: Keo Wagner DO    CONSULTANT(S):   IP CONSULT TO PRIMARY CARE PROVIDER  IP CONSULT TO INFECTIOUS DISEASES  IP CONSULT TO PODIATRY  IP CONSULT TO IV TEAM  IP CONSULT TO HOME CARE NEEDS     ADMITTING DIAGNOSIS:   Hypokalemia [E87.6]  Lymph edema [I89.0]  Open wound, lower leg, right, initial encounter [S81.801A]  Cellulitis of both lower extremities [L03.115, L03.116]  Cellulitis of lower extremity, unspecified laterality [L03.119]     Please see H&P for further details    DISCHARGE DIAGNOSES:   Active Hospital Problems    Diagnosis     Cellulitis of both lower extremities [L03.115, L03.116]      Priority: High    Cerebral palsy (HCC) [G80.9]     Cobalamin deficiency [E53.8]     Depressive disorder [F32.A]     Iron deficiency anemia [D50.9]     Irritable bowel syndrome with diarrhea [K58.0]     Lymphedema [I89.0]     Obesity [E66.9]        BRIEF HISTORY OF PRESENT ILLNESS: Tatiana Silveira is a 48 y.o. female patient of Keo Wagner DO who  has a past medical history of Cerebral palsy (HCC), Cobalamin deficiency, Depressive disorder, Iron deficiency anemia, Lymphedema, and Obesity. who originally had concerns including Wound Check (Cellulitis moving up into thigh today was at knee yesterday) and Fever (). at presentation on 2024, and was found to have Hypokalemia [E87.6]  Lymph edema [I89.0]  Open wound, lower leg, right, initial encounter [S81.801A]  Cellulitis of both lower extremities [L03.115, L03.116]  Cellulitis of lower extremity, unspecified laterality [L03.119] after workup.    Please see H&P for further details.    HOSPITAL COURSE:   The patient presented to the hospital with the chief complaint of Wound Check (Cellulitis moving up into thigh today was at

## 2024-05-24 NOTE — PROGRESS NOTES
Physical Therapy  Facility/Department: [address] MED SURG  Physical Therapy Initial Assessment    Name: Tatiana Silveira  : 1975  MRN: [id number]  Date of Service: 2024    Attending Provider:  Bright Colin DO    Evaluating PT:  Ashwin Kang Jr., P.T.    Room #:  0509/0509-A  Diagnosis:  Hypokalemia [E87.6]  Lymph edema [I89.0]  Open wound, lower leg, right, initial encounter [S81.801A]  Cellulitis of both lower extremities [L03.115, L03.116]  Cellulitis of lower extremity, unspecified laterality [L03.119]  Pertinent PMHx/PSHx:  cerebral palsy, scoliosis, lymphedema BLE  Precautions:  falls    SUBJECTIVE:    Pt lives with her wife and children in a 1 story home with a ramp to enter.  Pt uses a power WC for mobility and is normally able to transfer on her own.  Some times she may need assist when legs feel weak as her knees tend to buckle.      OBJECTIVE:   Initial Evaluation  Date: 24 Treatment Short Term/ Long Term   Goals   Was pt agreeable to Eval/treatment? yes     Does pt have pain? C/o BLE pain 6/10     Bed Mobility  Rolling: MOD A  Supine to sit: MAX A  Sit to supine: NA  Scooting: MOD A to EOB  MIN A   Transfers Partial Sit to stand: MOD A  Stand to sit: MOD A  Partial Stand pivot: MOD A  SBA   Ambulation   NA  NA   Stair negotiation: ascended and descended NA  NA, pt has a ramp   WC mobility NA  Pt has Power WC at home.    AM-PAC 6 Clicks 10/24       BLE ROM is WFL, B knees lacking extension which has always been her baseline due to her cerebral palsy.   BLE strength is grossly 2-/5 to 3-/5.   Sensation:  Pt reports tingling to BLE  Edema:  BLE  Balance: sitting is SBA/supervision and standing holding onto bed/chair is MIN A/MOD A  Endurance: fair-    Patient education  Pt educated on bed mobility    Patient response to education:   Pt verbalized understanding Pt demonstrated skill Pt requires further education in this area   yes yes yes     ASSESSMENT:    Conditions Requiring Skilled

## 2024-05-24 NOTE — PLAN OF CARE
Problem: ABCDS Injury Assessment  Goal: Absence of physical injury  5/24/2024 0012 by Juanita Rico  Outcome: Progressing  5/23/2024 1031 by Urvashi Bolanos, RN  Outcome: Progressing     Problem: Skin/Tissue Integrity  Goal: Absence of new skin breakdown  Description: 1.  Monitor for areas of redness and/or skin breakdown  2.  Assess vascular access sites hourly  3.  Every 4-6 hours minimum:  Change oxygen saturation probe site  4.  Every 4-6 hours:  If on nasal continuous positive airway pressure, respiratory therapy assess nares and determine need for appliance change or resting period.  5/24/2024 0012 by Juanita Rico  Outcome: Progressing  5/23/2024 1031 by Urvashi Bolanos, RN  Outcome: Progressing     Problem: Discharge Planning  Goal: Discharge to home or other facility with appropriate resources  5/24/2024 0012 by Juanita Rico  Outcome: Progressing  5/23/2024 1031 by Urvashi Bolanos, RN  Outcome: Progressing     Problem: Pain  Goal: Verbalizes/displays adequate comfort level or baseline comfort level  5/24/2024 0012 by Juanita Rico  Outcome: Progressing  5/23/2024 1031 by Urvashi Bolanos, RN  Outcome: Progressing     Problem: Safety - Adult  Goal: Free from fall injury  5/24/2024 0012 by Juanita Rico  Outcome: Progressing  5/23/2024 1031 by Urvashi Bolanos, RN  Outcome: Progressing

## 2024-05-24 NOTE — PROGRESS NOTES
CLINICAL PHARMACY NOTE: MEDS TO BEDS    Total # of Prescriptions Filled: 1   The following medications were delivered to the patient:  Miconazole nitrate 2 % cream    Additional Documentation:

## 2024-05-25 LAB
MICROORGANISM SPEC CULT: ABNORMAL
MICROORGANISM/AGENT SPEC: ABNORMAL
SPECIMEN DESCRIPTION: ABNORMAL

## 2024-05-26 LAB
MICROORGANISM SPEC CULT: NORMAL
MICROORGANISM SPEC CULT: NORMAL
SERVICE CMNT-IMP: NORMAL
SERVICE CMNT-IMP: NORMAL
SPECIMEN DESCRIPTION: NORMAL
SPECIMEN DESCRIPTION: NORMAL

## 2024-05-27 NOTE — PROGRESS NOTES
Physician Progress Note      PATIENT:               GURMEET URIBE  [facility] #:                  [id number]  :                       1975  ADMIT DATE:       2024 2:26 PM  DISCH DATE:        2024 3:58 PM  RESPONDING  PROVIDER #:        Bright Colin DO          QUERY TEXT:    Patient admitted with leg cellulitis. Documentation reflects Sepsis in H&P.    If possible, please document in the progress notes and discharge summary if   Sepsis was:    The medical record reflects the following:  Risk Factors: h/o CP  Clinical Indicators: , temp 97.9, HR 94, WBC 9.8, per H&P \"...Bilateral   LE cellulitis (L>R) w/ associated sepsis and known LE lymphedema...\", per IM   - \"...Cellulitis Left Leg with Venous Stasis Ulcers...\"  Treatment: ID consult, IV Ancef, Linezolid    Thank you,  Candis Reynolds RN, BSN, CDIS  Clinical Documentation Integrity  [email or web address]  Options provided:  -- Sepsis confirmed after study as evidenced by, please document support.  -- Sepsis ruled out after study  -- Other - I will add my own diagnosis  -- Disagree - Not applicable / Not valid  -- Disagree - Clinically unable to determine / Unknown  -- Refer to Clinical Documentation Reviewer    PROVIDER RESPONSE TEXT:    Sepsis ruled out after study.    Query created by: Candis Reynolds on 2024 9:49 AM      Electronically signed by:  Bright Colin DO 2024 4:41 PM

## 2024-06-06 ENCOUNTER — OFFICE VISIT (OUTPATIENT)
Dept: PRIMARY CARE CLINIC | Age: 49
End: 2024-06-06

## 2024-06-06 VITALS
SYSTOLIC BLOOD PRESSURE: 144 MMHG | HEART RATE: 91 BPM | HEIGHT: 68 IN | WEIGHT: 256 LBS | BODY MASS INDEX: 38.8 KG/M2 | TEMPERATURE: 98 F | OXYGEN SATURATION: 99 % | DIASTOLIC BLOOD PRESSURE: 92 MMHG

## 2024-06-06 DIAGNOSIS — F51.01 PRIMARY INSOMNIA: ICD-10-CM

## 2024-06-06 DIAGNOSIS — I89.0 LYMPHEDEMA: ICD-10-CM

## 2024-06-06 DIAGNOSIS — K21.9 GASTROESOPHAGEAL REFLUX DISEASE WITHOUT ESOPHAGITIS: ICD-10-CM

## 2024-06-06 DIAGNOSIS — L03.115 CELLULITIS OF BOTH LOWER EXTREMITIES: ICD-10-CM

## 2024-06-06 DIAGNOSIS — E53.8 COBALAMIN DEFICIENCY: ICD-10-CM

## 2024-06-06 DIAGNOSIS — F41.9 ANXIETY: ICD-10-CM

## 2024-06-06 DIAGNOSIS — D64.9 ANEMIA, UNSPECIFIED TYPE: ICD-10-CM

## 2024-06-06 DIAGNOSIS — G80.8 OTHER CEREBRAL PALSY (HCC): ICD-10-CM

## 2024-06-06 DIAGNOSIS — F32.A DEPRESSIVE DISORDER: ICD-10-CM

## 2024-06-06 DIAGNOSIS — D50.8 IRON DEFICIENCY ANEMIA SECONDARY TO INADEQUATE DIETARY IRON INTAKE: ICD-10-CM

## 2024-06-06 DIAGNOSIS — L03.116 CELLULITIS OF BOTH LOWER EXTREMITIES: ICD-10-CM

## 2024-06-06 DIAGNOSIS — E66.01 CLASS 2 SEVERE OBESITY DUE TO EXCESS CALORIES WITH SERIOUS COMORBIDITY AND BODY MASS INDEX (BMI) OF 38.0 TO 38.9 IN ADULT (HCC): ICD-10-CM

## 2024-06-06 DIAGNOSIS — K58.0 IRRITABLE BOWEL SYNDROME WITH DIARRHEA: ICD-10-CM

## 2024-06-06 DIAGNOSIS — G80.8 OTHER CEREBRAL PALSY (HCC): Primary | ICD-10-CM

## 2024-06-06 PROBLEM — R10.84 GENERALIZED ABDOMINAL PAIN: Status: RESOLVED | Noted: 2024-05-16 | Resolved: 2024-06-06

## 2024-06-06 LAB
ABSOLUTE RETIC #: 0.09 M/UL
ALBUMIN: 4.3 G/DL (ref 3.5–5.2)
ALP BLD-CCNC: 75 U/L (ref 35–104)
ALT SERPL-CCNC: 11 U/L (ref 0–32)
ANION GAP SERPL CALCULATED.3IONS-SCNC: 20 MMOL/L (ref 7–16)
AST SERPL-CCNC: 20 U/L (ref 0–31)
BASOPHILS ABSOLUTE: 0.03 K/UL (ref 0–0.2)
BASOPHILS RELATIVE PERCENT: 1 % (ref 0–2)
BILIRUB SERPL-MCNC: 0.2 MG/DL (ref 0–1.2)
BUN BLDV-MCNC: 15 MG/DL (ref 6–20)
CALCIUM SERPL-MCNC: 9.4 MG/DL (ref 8.6–10.2)
CHLORIDE BLD-SCNC: 105 MMOL/L (ref 98–107)
CHOLESTEROL, TOTAL: 177 MG/DL
CO2: 19 MMOL/L (ref 22–29)
CREAT SERPL-MCNC: 0.7 MG/DL (ref 0.5–1)
EOSINOPHILS ABSOLUTE: 0.28 K/UL (ref 0.05–0.5)
EOSINOPHILS RELATIVE PERCENT: 5 % (ref 0–6)
GFR, ESTIMATED: >90 ML/MIN/1.73M2
GLUCOSE BLD-MCNC: 93 MG/DL (ref 74–99)
HCT VFR BLD CALC: 37.1 % (ref 34–48)
HDLC SERPL-MCNC: 58 MG/DL
HEMOGLOBIN: 11.3 G/DL (ref 11.5–15.5)
IMMATURE GRANULOCYTES %: 1 % (ref 0–5)
IMMATURE GRANULOCYTES ABSOLUTE: 0.03 K/UL (ref 0–0.58)
IMMATURE RETIC FRACT: 24.6 % (ref 3–15.9)
IRON % SATURATION: 11 % (ref 15–50)
IRON: 41 UG/DL (ref 37–145)
LYMPHOCYTES ABSOLUTE: 1.53 K/UL (ref 1.5–4)
LYMPHOCYTES RELATIVE PERCENT: 28 % (ref 20–42)
MCH RBC QN AUTO: 25.7 PG (ref 26–35)
MCHC RBC AUTO-ENTMCNC: 30.5 G/DL (ref 32–34.5)
MCV RBC AUTO: 84.5 FL (ref 80–99.9)
MONOCYTES ABSOLUTE: 0.43 K/UL (ref 0.1–0.95)
MONOCYTES RELATIVE PERCENT: 8 % (ref 2–12)
NEUTROPHILS ABSOLUTE: 3.14 K/UL (ref 1.8–7.3)
NEUTROPHILS RELATIVE PERCENT: 58 % (ref 43–80)
PDW BLD-RTO: 15.5 % (ref 11.5–15)
PMV BLD AUTO: 9 FL (ref 7–12)
POTASSIUM SERPL-SCNC: 4.1 MMOL/L (ref 3.5–5)
RBC # BLD: 4.39 M/UL (ref 3.5–5.5)
RETIC %: 2 % (ref 0.4–1.9)
RETIC HEMOGLOBIN: 31.7 PG (ref 28.2–36.6)
SODIUM BLD-SCNC: 144 MMOL/L (ref 132–146)
TOTAL IRON BINDING CAPACITY: 372 UG/DL (ref 250–450)
TOTAL PROTEIN: 8.1 G/DL (ref 6.4–8.3)
TRIGL SERPL-MCNC: 164 MG/DL
TSH SERPL DL<=0.05 MIU/L-ACNC: 1.28 UIU/ML (ref 0.27–4.2)
VITAMIN D 25-HYDROXY: 19 NG/ML (ref 30–100)
VLDLC SERPL CALC-MCNC: 33 MG/DL
WBC # BLD: 5.4 K/UL (ref 4.5–11.5)

## 2024-06-06 SDOH — ECONOMIC STABILITY: FOOD INSECURITY: WITHIN THE PAST 12 MONTHS, THE FOOD YOU BOUGHT JUST DIDN'T LAST AND YOU DIDN'T HAVE MONEY TO GET MORE.: NEVER TRUE

## 2024-06-06 SDOH — ECONOMIC STABILITY: HOUSING INSECURITY
IN THE LAST 12 MONTHS, WAS THERE A TIME WHEN YOU DID NOT HAVE A STEADY PLACE TO SLEEP OR SLEPT IN A SHELTER (INCLUDING NOW)?: NO

## 2024-06-06 SDOH — ECONOMIC STABILITY: FOOD INSECURITY: WITHIN THE PAST 12 MONTHS, YOU WORRIED THAT YOUR FOOD WOULD RUN OUT BEFORE YOU GOT MONEY TO BUY MORE.: NEVER TRUE

## 2024-06-06 SDOH — ECONOMIC STABILITY: INCOME INSECURITY: HOW HARD IS IT FOR YOU TO PAY FOR THE VERY BASICS LIKE FOOD, HOUSING, MEDICAL CARE, AND HEATING?: NOT HARD AT ALL

## 2024-06-06 ASSESSMENT — PATIENT HEALTH QUESTIONNAIRE - PHQ9
8. MOVING OR SPEAKING SO SLOWLY THAT OTHER PEOPLE COULD HAVE NOTICED. OR THE OPPOSITE, BEING SO FIGETY OR RESTLESS THAT YOU HAVE BEEN MOVING AROUND A LOT MORE THAN USUAL: NOT AT ALL
1. LITTLE INTEREST OR PLEASURE IN DOING THINGS: SEVERAL DAYS
2. FEELING DOWN, DEPRESSED OR HOPELESS: SEVERAL DAYS
5. POOR APPETITE OR OVEREATING: SEVERAL DAYS
3. TROUBLE FALLING OR STAYING ASLEEP: SEVERAL DAYS
SUM OF ALL RESPONSES TO PHQ9 QUESTIONS 1 & 2: 2
4. FEELING TIRED OR HAVING LITTLE ENERGY: SEVERAL DAYS
9. THOUGHTS THAT YOU WOULD BE BETTER OFF DEAD, OR OF HURTING YOURSELF: NOT AT ALL
SUM OF ALL RESPONSES TO PHQ QUESTIONS 1-9: 7
SUM OF ALL RESPONSES TO PHQ QUESTIONS 1-9: 7
6. FEELING BAD ABOUT YOURSELF - OR THAT YOU ARE A FAILURE OR HAVE LET YOURSELF OR YOUR FAMILY DOWN: SEVERAL DAYS
7. TROUBLE CONCENTRATING ON THINGS, SUCH AS READING THE NEWSPAPER OR WATCHING TELEVISION: SEVERAL DAYS
SUM OF ALL RESPONSES TO PHQ QUESTIONS 1-9: 7
SUM OF ALL RESPONSES TO PHQ QUESTIONS 1-9: 7
10. IF YOU CHECKED OFF ANY PROBLEMS, HOW DIFFICULT HAVE THESE PROBLEMS MADE IT FOR YOU TO DO YOUR WORK, TAKE CARE OF THINGS AT HOME, OR GET ALONG WITH OTHER PEOPLE: NOT DIFFICULT AT ALL

## 2024-06-07 LAB
FOLATE: 4.4 NG/ML (ref 4.8–24.2)
HBA1C MFR BLD: 5.6 % (ref 4–5.6)
VITAMIN B-12: 367 PG/ML (ref 211–946)

## 2024-06-12 PROBLEM — F41.9 ANXIETY: Status: ACTIVE | Noted: 2024-06-12

## 2024-06-12 PROBLEM — F51.01 PRIMARY INSOMNIA: Status: ACTIVE | Noted: 2024-06-12

## 2024-06-12 ASSESSMENT — ENCOUNTER SYMPTOMS
CHEST TIGHTNESS: 0
COLOR CHANGE: 0
ABDOMINAL DISTENTION: 0
BACK PAIN: 0
COUGH: 0
APNEA: 0
CONSTIPATION: 0
RHINORRHEA: 0
ABDOMINAL PAIN: 0
VOMITING: 0
SHORTNESS OF BREATH: 0
NAUSEA: 0
VOICE CHANGE: 0
DIARRHEA: 0
FACIAL SWELLING: 0
EYES NEGATIVE: 1
WHEEZING: 0

## 2024-06-12 NOTE — ASSESSMENT & PLAN NOTE
requesting refill of alprazolam, this has not been filled for several years.  I did advise patient that I will not prescribe controlled substance in primary care and a referral is placed to psychiatry.

## 2024-06-12 NOTE — ASSESSMENT & PLAN NOTE
poorly controlled.  Follows with podiatry.  Is getting home health dressing changes.  Denies any fevers or chills.  Currently not on oral antibiotics at this time.  Elevate lower extremities is much as possible.

## 2024-06-12 NOTE — ASSESSMENT & PLAN NOTE
poorly controlled.  Patient is currently taking Seroquel.  Does participate in counseling but does not follow with psychiatry.  Denies any suicidal or homicidal ideation.

## 2024-06-12 NOTE — ASSESSMENT & PLAN NOTE
Not at goal.  Patient is wheelchair-bound due to cerebral palsy.  Encourage following a Mediterranean diet.

## 2024-06-12 NOTE — PROGRESS NOTES
diet.  9. Irritable bowel syndrome with diarrhea  Assessment & Plan:    controlled.  Avoid food triggers.    10. Cobalamin deficiency  Assessment & Plan:    tolerating supplementation, repeat labs today.  11. Cellulitis of both lower extremities  Assessment & Plan:    follows with podiatry.  Continue dressing changes.  Red flag symptoms were discussed, if these occur go to the ER.  12. Primary insomnia  Assessment & Plan:    poorly controlled.  Patient has intermittently had Ambien prescription prescribed, OARRS report reviewed.  This will be managed by psychiatry, referral placed.     Counseled patient regarding above diagnosis, including possible risks and complications, especially if left uncontrolled.  Counseled patient as appropriate and relevant regarding any possible side effects, risks, and alternatives to treatment; the patient and/or guardian verbalizes understanding, and is in agreement with the plan as detailed above.   All educational materials and instructions were discussed and included on the After Visit Summary.  All questions answered to the patient's satisfaction.  The patient was advised to call or send [employer] message for any concerns prior to next appointment.  Return in about 3 months (around 9/6/2024) for 3 month f/u- 30 min.  KIKI Rosado - NP

## 2024-06-12 NOTE — ASSESSMENT & PLAN NOTE
poorly controlled.  Patient has intermittently had Ambien prescription prescribed, OARRS report reviewed.  This will be managed by psychiatry, referral placed.

## 2024-06-12 NOTE — ASSESSMENT & PLAN NOTE
follows with podiatry.  Continue dressing changes.  Red flag symptoms were discussed, if these occur go to the ER.

## 2024-06-14 NOTE — TELEPHONE ENCOUNTER
Angie from [facility] called. She is with patient right now and wants to let you know that patient has areas on her L foot, lower leg, and upper thigh that looks like the cellulitis is reoccurring again and is edematous. She reports that patient states that she had a fever last night. She also needs refill of the miconazole cream that they put on her.

## 2024-06-17 ENCOUNTER — TELEPHONE (OUTPATIENT)
Dept: PRIMARY CARE CLINIC | Age: 49
End: 2024-06-17

## 2024-06-17 NOTE — TELEPHONE ENCOUNTER
Angie from [facility] called in with an update about the patient. She states that her BP today is 170/120 and patient is very anxious and thinks that that cellulitis is coming back in her leg. She said that patient says she used to be on xanax, but she didn't know why it wasn't refilled and that may be helpful. I advised that was discussed in office with Lynda and she referred her to psych. She states that patient says her Left foot is very painful when trying to pivot on it. I advised that Lynda was out of office, but I will send the message to a provider covering. I also advised that the advice may be that patient needs to be seen in the ED again for evaluation of cellulitis and BP and Angie stated that she thought that may be the case. We are to call the patient with whatever is advised.

## 2024-07-03 ENCOUNTER — TELEPHONE (OUTPATIENT)
Dept: PRIMARY CARE CLINIC | Age: 49
End: 2024-07-03

## 2024-07-03 DIAGNOSIS — R32 URINARY INCONTINENCE, UNSPECIFIED TYPE: Primary | ICD-10-CM

## 2024-07-03 RX ORDER — LISINOPRIL 20 MG/1
20 TABLET ORAL DAILY
Qty: 30 TABLET | Refills: 5 | Status: SHIPPED | OUTPATIENT
Start: 2024-07-03

## 2024-07-03 NOTE — TELEPHONE ENCOUNTER
Angie with [facility] called stating patient has not been taking her water pill because she is unable to get depends on and is unable to get up on the bedside commode in time and will have accidents.    Patient is now hypertensive 170/120 yesterday with a headache.  Today she is 164/98 - no symptoms.  Nurse is wanting to know if you want to start her on a bp medication.

## 2024-07-03 NOTE — TELEPHONE ENCOUNTER
Patient notified.  She states she is prescribed hctz 25mg qd and lasix 20mg qod but she is unable to take either.  Patient is not willing to take them because she is urinating in her wheelchair and all over the floor.  Patient said she is unable to get up to the toilet and unable to get depends on.   Legs are swollen if not wrapped.  She is wanting a referral to another urologist to see if they will do a super pubic cath, patient is unable to do berger cath due to infections.  Patient sees dr. Goodrich now and he won't do the super pubic cath.

## 2024-07-22 ENCOUNTER — OFFICE VISIT (OUTPATIENT)
Dept: PODIATRY | Age: 49
End: 2024-07-22
Payer: COMMERCIAL

## 2024-07-22 VITALS — WEIGHT: 256 LBS | BODY MASS INDEX: 38.8 KG/M2 | HEIGHT: 68 IN

## 2024-07-22 DIAGNOSIS — I89.0 LYMPHEDEMA: Primary | ICD-10-CM

## 2024-07-22 DIAGNOSIS — L84 CORNS AND CALLOSITIES: ICD-10-CM

## 2024-07-22 DIAGNOSIS — G80.9 CEREBRAL PALSY, UNSPECIFIED TYPE (HCC): ICD-10-CM

## 2024-07-22 DIAGNOSIS — G60.8 HEREDITARY SENSORY NEUROPATHY: ICD-10-CM

## 2024-07-22 DIAGNOSIS — B35.1 TINEA UNGUIUM: ICD-10-CM

## 2024-07-22 PROCEDURE — 99203 OFFICE O/P NEW LOW 30 MIN: CPT | Performed by: PODIATRIST

## 2024-07-22 PROCEDURE — G8417 CALC BMI ABV UP PARAM F/U: HCPCS | Performed by: PODIATRIST

## 2024-07-22 PROCEDURE — 1036F TOBACCO NON-USER: CPT | Performed by: PODIATRIST

## 2024-07-22 PROCEDURE — G8427 DOCREV CUR MEDS BY ELIG CLIN: HCPCS | Performed by: PODIATRIST

## 2024-07-22 PROCEDURE — 11721 DEBRIDE NAIL 6 OR MORE: CPT | Performed by: PODIATRIST

## 2024-07-22 NOTE — PROGRESS NOTES
05/16/2024    Lymphedema     Obesity 05/16/2024     There were no vitals filed for this visit.     Work History/Social History:   Foot and ankle history:     Focused Lower Extremity Physical Exam:    Neurovascular examination:    Dorsalis Pedis palpable bilateral.  Posterior tibialis non-palpable bilateral.    Capillary Refill Time:  Immediate return  Hair growth:  Symmetrical and bilateral   Skin:  Not atrophic  Edema: Lymphedema lower legs noted especially dorsal feet bilateral.  No erythema.  Neurologic:  Light touch diminished bilateral.  Warm to coolness bilateral distal toes  Decreased epicritic sensation    Musculoskeletal/ Orthopedic examination:    Mild tenderness overlying all elongated toenails      Dermatology examination:    Toenails 1 through 5 bilateral thickened, elongated, dystrophic, mycotic with subungual debris.  Web spaces 1 through 4 bilateral clean dry and intact.  No open wounds      Assessment and Plan:  Tatiana was seen today for new patient and edema.    Diagnoses and all orders for this visit:    Lymphedema  -     [facility]  -     Amb External Referral To Physical Therapy    Tinea unguium    Cerebral palsy, unspecified type (HCC)    Corns and callosities    Hereditary sensory neuropathy          New referral clinical lymphedema.  I did place order for lymphedema therapy at [facility].  No previous history of lymphedema therapy.  We did also place home health care orders for compression wraps.  Formal debridement toenails 1 through 5 right and left with manual debridement for thickness and overall length.  Any new skin lesions or abrasions I would be happy to see sooner.  Follow-up 2 months.      Return in about 2 months (around 9/22/2024).     Seen By:  Jadiel Woodward DPM      Document was created using voice recognition software.  Note was reviewed however may contain grammatical errors.

## 2024-07-23 ENCOUNTER — TELEPHONE (OUTPATIENT)
Dept: PRIMARY CARE CLINIC | Age: 49
End: 2024-07-23

## 2024-07-23 NOTE — TELEPHONE ENCOUNTER
This referral was ordered by Dr. Woodward.  Will send over to [facility].    Electronically signed by Brigida Mckinnon LPN on 7/23/2024 at 2:44 PM

## 2024-07-23 NOTE — TELEPHONE ENCOUNTER
This referral is not done.  Was not given referral form.  Will send over to [facility] lymphedema therapy.    Electronically signed by Brigida Mckinnon LPN on 7/23/2024 at 2:42 PM

## 2024-07-23 NOTE — TELEPHONE ENCOUNTER
Last Appointment:  6/6/2024  Future Appointments   Date Time Provider Department Center   9/10/2024 10:30 AM Lynda Fischer APRN - NP [facility]   10/7/2024  3:00 PM Jadiel Woodward, ADRIANA Rodriguez [facility]      Saw this referral in my que for the PT referral.  Did not realize you had ordered this until after documentation of referral to [facility] lymphedema.      Order for home health to wrap legs and PT to wrap legs is not appropriate.  Can't have both doing same thing.      Did not send PT order to [facility] Lymphedema.    Electronically signed by Brigida Mckinnon LPN on 7/23/2024 at 2:49 PM

## 2024-07-23 NOTE — TELEPHONE ENCOUNTER
Referral to physical therapy is not going to work the place it was sent to does not have a nurse to do the physical therapy or wraps.

## 2024-07-23 NOTE — TELEPHONE ENCOUNTER
Yes the patient called in to report [facility] does not have nurse to do wraps.  That is per her phone message states.      [facility] does have lymphedema therapy.      Electronically signed by Brigida Mckinnon LPN on 7/23/2024 at 3:24 PM

## 2024-08-01 ENCOUNTER — TELEPHONE (OUTPATIENT)
Dept: PODIATRY | Age: 49
End: 2024-08-01

## 2024-08-01 NOTE — TELEPHONE ENCOUNTER
Patient called into our office regarding the home health referral that we had placed just for the nurses to do lymphedema wraps 3 times weekly, stating that she had not heard anything. She did hear from physical therapy, but stated that she is unable to get into the car to go to physical therapy.    She voiced that she would just prefer to have strictly home health right now. I notified Dr Woodward what she said and after talking with him, it felt necessary to send you a message letting you know what is going on and get your thoughts about having you place the referral for home health since you are her PCP.     Any questions, please let myself or Dr Woodward know.

## 2024-08-02 ENCOUNTER — TELEPHONE (OUTPATIENT)
Dept: PRIMARY CARE CLINIC | Age: 49
End: 2024-08-02

## 2024-08-02 DIAGNOSIS — I89.0 LYMPHEDEMA: ICD-10-CM

## 2024-08-02 DIAGNOSIS — E66.01 CLASS 2 SEVERE OBESITY DUE TO EXCESS CALORIES WITH SERIOUS COMORBIDITY AND BODY MASS INDEX (BMI) OF 38.0 TO 38.9 IN ADULT (HCC): ICD-10-CM

## 2024-08-02 DIAGNOSIS — G80.8 OTHER CEREBRAL PALSY (HCC): Primary | ICD-10-CM

## 2024-08-02 NOTE — TELEPHONE ENCOUNTER
Last Appointment:  6/6/2024  Future Appointments   Date Time Provider Department Center   9/10/2024 10:30 AM Lynda Fischer APRN - NP [facility]   10/7/2024  3:00 PM Jadiel Woodward, ADRIANA Rodriguez [facility]      Back on 07/22/2024 Dr. Woodward put a referral in for [facility].  He also put in a Physical therapy referral for same DX -Lymphedema.      What happened is Home Care saw patient had order for PT outpatient so home care did not schedule.      Then when PT called patient to schedule patient told them home care was coming out so we got a note from PT [facility] stating they could not go into home if patient is going out patient PT.      So, now patient is requesting home visits because she states she is too weak to get into the car for outpatient therapy.    Saw your referral but DX of lymphedema needs added as well as instructions for unna boots.      [facility] does not have staffing for this.      Electronically signed by Brigida Mckinnon LPN on 8/2/2024 at 2:19 PM

## 2024-08-04 DIAGNOSIS — I89.0 LYMPHEDEMA: Primary | ICD-10-CM

## 2024-08-04 DIAGNOSIS — G80.8 OTHER CEREBRAL PALSY (HCC): ICD-10-CM

## 2024-08-04 DIAGNOSIS — E66.01 CLASS 2 SEVERE OBESITY DUE TO EXCESS CALORIES WITH SERIOUS COMORBIDITY AND BODY MASS INDEX (BMI) OF 38.0 TO 38.9 IN ADULT (HCC): ICD-10-CM

## 2024-08-06 ENCOUNTER — TELEPHONE (OUTPATIENT)
Dept: PRIMARY CARE CLINIC | Age: 49
End: 2024-08-06

## 2024-08-06 NOTE — TELEPHONE ENCOUNTER
Called [facility] spoke with Mary [employer] who informed me they no longer do lymphedema treatments in the home.  The nurse that did them left and that position has not been filled.    Electronically signed by Brigida Mckinnon LPN on 8/6/2024 at 11:48 AM

## 2024-08-06 NOTE — TELEPHONE ENCOUNTER
Informed patient no home health services available at this time to help with leg wraps.    Informed patient only option is outpatient therapy.  Again she states she does not have the help to get in and out of car.  She also stated appointment she had today doctor told her wrap was not that bad just needed to be a little tighter.  She will keep wrapping then let us know when she can go outpatient.    Electronically signed by Brigida Mckinnon LPN on 8/6/2024 at 1:55 PM

## 2024-08-06 NOTE — TELEPHONE ENCOUNTER
Spoke with patient regarding going to outpatient therapy for her legs.  She reports she is still having pain getting in car.  She could not discuss issue right now because she is at an appointment.  Patient is to return call.    Electronically signed by Brigida Mckinnon LPN on 8/6/2024 at 11:43 AM

## 2024-08-06 NOTE — TELEPHONE ENCOUNTER
Last Appointment:  6/6/2024  Future Appointments   Date Time Provider Department Center   9/10/2024 10:30 AM Lynda Fischer [facility]N - NP [facility]   10/7/2024  3:00 PM Jadiel Woodward, [facility]      Have not been able to find a home health agency that can take referral because of insurance or because staffing.      Spoke with patient she reports she has been wrapping legs herself.  At this point she may have to go outpatient.    Electronically signed by Brigida Mckinnon LPN on 8/6/2024 at 10:48 AM

## 2024-08-12 ENCOUNTER — OFFICE VISIT (OUTPATIENT)
Dept: FAMILY MEDICINE CLINIC | Age: 49
End: 2024-08-12
Payer: COMMERCIAL

## 2024-08-12 ENCOUNTER — TELEPHONE (OUTPATIENT)
Dept: ADMINISTRATIVE | Age: 49
End: 2024-08-12

## 2024-08-12 VITALS
HEART RATE: 77 BPM | RESPIRATION RATE: 18 BRPM | HEIGHT: 68 IN | WEIGHT: 256 LBS | DIASTOLIC BLOOD PRESSURE: 86 MMHG | SYSTOLIC BLOOD PRESSURE: 144 MMHG | OXYGEN SATURATION: 100 % | TEMPERATURE: 97.9 F | BODY MASS INDEX: 38.8 KG/M2

## 2024-08-12 DIAGNOSIS — I89.0 LYMPHEDEMA OF BOTH LOWER EXTREMITIES: ICD-10-CM

## 2024-08-12 DIAGNOSIS — R32 URINARY INCONTINENCE, UNSPECIFIED TYPE: ICD-10-CM

## 2024-08-12 DIAGNOSIS — G80.9 CEREBRAL PALSY, UNSPECIFIED TYPE (HCC): ICD-10-CM

## 2024-08-12 DIAGNOSIS — L03.116 CELLULITIS OF BOTH LOWER EXTREMITIES: Primary | ICD-10-CM

## 2024-08-12 DIAGNOSIS — L03.115 CELLULITIS OF BOTH LOWER EXTREMITIES: Primary | ICD-10-CM

## 2024-08-12 PROCEDURE — 99214 OFFICE O/P EST MOD 30 MIN: CPT | Performed by: NURSE PRACTITIONER

## 2024-08-12 PROCEDURE — G8427 DOCREV CUR MEDS BY ELIG CLIN: HCPCS | Performed by: NURSE PRACTITIONER

## 2024-08-12 PROCEDURE — 1036F TOBACCO NON-USER: CPT | Performed by: NURSE PRACTITIONER

## 2024-08-12 PROCEDURE — G8417 CALC BMI ABV UP PARAM F/U: HCPCS | Performed by: NURSE PRACTITIONER

## 2024-08-12 RX ORDER — CEPHALEXIN 500 MG/1
500 CAPSULE ORAL 2 TIMES DAILY
Qty: 14 CAPSULE | Refills: 0 | Status: SHIPPED | OUTPATIENT
Start: 2024-08-12 | End: 2024-08-19

## 2024-08-12 NOTE — TELEPHONE ENCOUNTER
The top of both feet are red x 2 days.  They seem worse today.  Pt is concerned because she has hx lymphedema.  Staff unavailable due to pt care.  Please contact pt.

## 2024-08-12 NOTE — PROGRESS NOTES
Chief Complaint:   Foot Problem      History of Present Illness   Source of history provided by:  patient.    Tatiana Silveira is a 48 y.o. old female who presents to walk-in care for evaluation of redness to the bilateral feet, which began 2 days ago. Reports the area is warm to touch, moderately painful, and swollen.  Denies lymphangitic streaking.  Denies any bleeding or active drainage. Since onset the symptoms have progressed.  Patient has been following with podiatry and should be undergoing Unna boot wraps, patient is noncompliant with this.  Patient denies any history of previous skin infections. Denies any fever, chills, HA, recent illness, myalgias, nausea, vomiting, or lethargy.      Review of Systems    Unless otherwise stated in this report or unable to obtain because of the patient's clinical or mental status as evidenced by the medical record, this patients's positive and negative responses for Review of Systems, constitutional, psych, eyes, ENT, cardiovascular, respiratory, gastrointestinal, neurological, genitourinary, musculoskeletal, integument systems and systems related to the presenting problem are either stated in the preceding or were negative for the symptoms and/or complaints related to the medical problem.    Past Medical History:  has a past medical history of Cerebral palsy (HCC), Cobalamin deficiency, Depressive disorder, Iron deficiency anemia, Lymphedema, and Obesity.   Past Surgical History:  has a past surgical history that includes Groin Surgery; knee surgery; Eye surgery; Achilles tendon surgery; Cholecystectomy; and  section.  Social History:  reports that she has never smoked. She has never used smokeless tobacco. She reports that she does not currently use alcohol. She reports that she does not use drugs.  Family History: family history is not on file.   Allergies: Latex, Morphine, Morphine, Pcn [penicillins], Penicillin v, and Penicillins    Physical Exam   Vital Signs:

## 2024-08-27 ENCOUNTER — TELEPHONE (OUTPATIENT)
Dept: PRIMARY CARE CLINIC | Age: 49
End: 2024-08-27

## 2024-08-27 NOTE — TELEPHONE ENCOUNTER
Patient called in and said that she finished the antibiotic and it is still red and she didn't know if you wanted her to come back in or what. She also reports a new secondary phone number of [phone]. I will add that to her chart.

## 2024-09-05 ENCOUNTER — OFFICE VISIT (OUTPATIENT)
Dept: FAMILY MEDICINE CLINIC | Age: 49
End: 2024-09-05

## 2024-09-05 VITALS
BODY MASS INDEX: 38.8 KG/M2 | WEIGHT: 256 LBS | OXYGEN SATURATION: 97 % | TEMPERATURE: 98.2 F | SYSTOLIC BLOOD PRESSURE: 128 MMHG | RESPIRATION RATE: 18 BRPM | HEIGHT: 68 IN | DIASTOLIC BLOOD PRESSURE: 80 MMHG | HEART RATE: 89 BPM

## 2024-09-05 DIAGNOSIS — L03.119 CELLULITIS OF LOWER EXTREMITY, UNSPECIFIED LATERALITY: Primary | ICD-10-CM

## 2024-09-05 RX ORDER — DOXYCYCLINE 100 MG/1
100 CAPSULE ORAL 2 TIMES DAILY
Qty: 20 CAPSULE | Refills: 0 | Status: SHIPPED | OUTPATIENT
Start: 2024-09-05 | End: 2024-09-15

## 2024-09-05 NOTE — PROGRESS NOTES
24  Tatiana Silveira : 1975 Sex: female  Age 48 y.o.    Subjective:  Chief Complaint   Patient presents with    Wound Infection       HPI:   Tatiana Silveira , 48 y.o. female presents to the clinic for evaluation of bilateral lower leg discomfort x 10 days. The patient also reports the area is tender, and swollen.  Patient was seen on 2024 for similar symptoms.  Patient completed Keflex reports symptoms improved and worsened over the past week.  The patient is not currently taking any medication for symptoms at this time. The patient denies bleeding, drainage, fever, lymphogenic streaking, myalgia, arthralgia, chills, and lethargy. The patient also denies headache, chest pain, abdominal pain, shortness of breath, and nausea / vomiting / diarrhea.    ROS:   Unless otherwise stated in this report the patient's positive and negative responses for review of systems for constitutional, eyes, ENT, cardiovascular, respiratory, gastrointestinal, neurological, , musculoskeletal, and integument systems and related systems to the presenting problem are either stated in the history of present illness or were not pertinent or were negative for the symptoms and/or complaints related to the presenting medical problem.  Positives and pertinent negatives as per HPI.  All others reviewed and are negative.      PMH:     Past Medical History:   Diagnosis Date    Cerebral palsy (HCC)     Cobalamin deficiency 2024    Depressive disorder 2024    Iron deficiency anemia 2024    Lymphedema     Obesity 2024       Past Surgical History:   Procedure Laterality Date    ACHILLES TENDON SURGERY       SECTION      CHOLECYSTECTOMY      EYE SURGERY      x 4    GROIN SURGERY      hips for CP    KNEE SURGERY      tendons for CP       History reviewed. No pertinent family history.    Medications:     Current Outpatient Medications:     doxycycline hyclate (VIBRAMYCIN) 100 MG capsule, Take 1 capsule by

## 2024-09-10 ENCOUNTER — OFFICE VISIT (OUTPATIENT)
Dept: PRIMARY CARE CLINIC | Age: 49
End: 2024-09-10
Payer: COMMERCIAL

## 2024-09-10 VITALS
SYSTOLIC BLOOD PRESSURE: 126 MMHG | TEMPERATURE: 98.4 F | DIASTOLIC BLOOD PRESSURE: 78 MMHG | BODY MASS INDEX: 38.92 KG/M2 | OXYGEN SATURATION: 99 % | HEIGHT: 68 IN | HEART RATE: 77 BPM

## 2024-09-10 DIAGNOSIS — D64.9 ANEMIA, UNSPECIFIED TYPE: ICD-10-CM

## 2024-09-10 DIAGNOSIS — D50.8 OTHER IRON DEFICIENCY ANEMIA: ICD-10-CM

## 2024-09-10 DIAGNOSIS — F32.A DEPRESSIVE DISORDER: ICD-10-CM

## 2024-09-10 DIAGNOSIS — E66.01 CLASS 2 SEVERE OBESITY DUE TO EXCESS CALORIES WITH SERIOUS COMORBIDITY AND BODY MASS INDEX (BMI) OF 38.0 TO 38.9 IN ADULT (HCC): Primary | ICD-10-CM

## 2024-09-10 DIAGNOSIS — K58.0 IRRITABLE BOWEL SYNDROME WITH DIARRHEA: ICD-10-CM

## 2024-09-10 DIAGNOSIS — E53.8 COBALAMIN DEFICIENCY: ICD-10-CM

## 2024-09-10 DIAGNOSIS — K21.9 GASTROESOPHAGEAL REFLUX DISEASE WITHOUT ESOPHAGITIS: ICD-10-CM

## 2024-09-10 DIAGNOSIS — G80.9 CEREBRAL PALSY, UNSPECIFIED TYPE (HCC): ICD-10-CM

## 2024-09-10 DIAGNOSIS — F51.01 PRIMARY INSOMNIA: ICD-10-CM

## 2024-09-10 DIAGNOSIS — F41.9 ANXIETY: ICD-10-CM

## 2024-09-10 DIAGNOSIS — I89.0 LYMPHEDEMA: ICD-10-CM

## 2024-09-10 PROBLEM — L03.116 CELLULITIS OF BOTH LOWER EXTREMITIES: Status: RESOLVED | Noted: 2024-05-21 | Resolved: 2024-09-10

## 2024-09-10 PROBLEM — L03.115 CELLULITIS OF BOTH LOWER EXTREMITIES: Status: RESOLVED | Noted: 2024-05-21 | Resolved: 2024-09-10

## 2024-09-10 PROCEDURE — G8417 CALC BMI ABV UP PARAM F/U: HCPCS | Performed by: NURSE PRACTITIONER

## 2024-09-10 PROCEDURE — G8427 DOCREV CUR MEDS BY ELIG CLIN: HCPCS | Performed by: NURSE PRACTITIONER

## 2024-09-10 PROCEDURE — 1036F TOBACCO NON-USER: CPT | Performed by: NURSE PRACTITIONER

## 2024-09-10 PROCEDURE — 99214 OFFICE O/P EST MOD 30 MIN: CPT | Performed by: NURSE PRACTITIONER

## 2024-09-10 RX ORDER — PANTOPRAZOLE SODIUM 40 MG/1
40 TABLET, DELAYED RELEASE ORAL DAILY
Qty: 90 TABLET | Refills: 1 | Status: SHIPPED | OUTPATIENT
Start: 2024-09-10

## 2024-09-10 RX ORDER — AMMONIUM LACTATE 12 G/100G
CREAM TOPICAL
Qty: 385 G | Refills: 2 | Status: SHIPPED | OUTPATIENT
Start: 2024-09-10 | End: 2024-10-10

## 2024-09-10 RX ORDER — PETROLATUM 42 G/100G
OINTMENT TOPICAL
Qty: 454 G | Refills: 2 | Status: SHIPPED | OUTPATIENT
Start: 2024-09-10

## 2024-09-10 ASSESSMENT — ENCOUNTER SYMPTOMS
WHEEZING: 0
BACK PAIN: 0
COLOR CHANGE: 0
APNEA: 0
COUGH: 0
VOMITING: 0
EYES NEGATIVE: 1
CHEST TIGHTNESS: 0
RHINORRHEA: 0
FACIAL SWELLING: 0
ABDOMINAL DISTENTION: 0
ABDOMINAL PAIN: 0
DIARRHEA: 0
SHORTNESS OF BREATH: 0
CONSTIPATION: 0
NAUSEA: 0
VOICE CHANGE: 0

## 2024-10-07 ENCOUNTER — OFFICE VISIT (OUTPATIENT)
Dept: PODIATRY | Age: 49
End: 2024-10-07
Payer: COMMERCIAL

## 2024-10-07 VITALS — HEIGHT: 68 IN | WEIGHT: 256 LBS | BODY MASS INDEX: 38.8 KG/M2

## 2024-10-07 DIAGNOSIS — I89.0 LYMPHEDEMA: ICD-10-CM

## 2024-10-07 DIAGNOSIS — G60.8 HEREDITARY SENSORY NEUROPATHY: ICD-10-CM

## 2024-10-07 DIAGNOSIS — G80.9 CEREBRAL PALSY, UNSPECIFIED TYPE (HCC): ICD-10-CM

## 2024-10-07 DIAGNOSIS — B35.1 TINEA UNGUIUM: Primary | ICD-10-CM

## 2024-10-07 DIAGNOSIS — L84 CORNS AND CALLOSITIES: ICD-10-CM

## 2024-10-07 PROCEDURE — 99999 PR OFFICE/OUTPT VISIT,PROCEDURE ONLY: CPT | Performed by: PODIATRIST

## 2024-10-07 PROCEDURE — 11721 DEBRIDE NAIL 6 OR MORE: CPT | Performed by: PODIATRIST

## 2024-10-07 PROCEDURE — 11056 PARNG/CUTG B9 HYPRKR LES 2-4: CPT | Performed by: PODIATRIST

## 2024-10-07 NOTE — PROGRESS NOTES
Patient here for a 2 month f/u.  Lymphedema, using leg wraps at home. Looks a lot better.    Last visit, Lynda Fischer APRN - NP, 9/10/2024.    Electronically signed by Jadiel Woodward DPM on 10/8/2024 at 3:46 PM    CC:   Follow-up painful elongated toenails 1-5 right and left  History of lymphedema lower legs    HPI  Follow-up history lymphedema.  No recent injury or trauma.  History gabapentin.  Has not been able to get ride transportation to go to lymphedema therapy.  Denies any open wounds.  No additional pedal complaints today.      ROS:  Const: Denies constitutional symptoms  Musculo: Denies symptoms other than stated above  Skin: Denies symptoms other than stated above      Current Outpatient Medications:     mineral oil-hydrophilic petrolatum (HYDROPHOR) ointment, Apply topically as needed., Disp: 454 g, Rfl: 2    ammonium lactate (AMLACTIN) 12 % cream, Apply topically as needed., Disp: 385 g, Rfl: 2    pantoprazole (PROTONIX) 40 MG tablet, Take 1 tablet by mouth daily, Disp: 90 tablet, Rfl: 1    lisinopril (PRINIVIL;ZESTRIL) 20 MG tablet, Take 1 tablet by mouth daily, Disp: 30 tablet, Rfl: 5    miconazole (MICOTIN) 2 % cream, Apply topically 2 times daily to webspaces between bilateral toes, Disp: 1 each, Rfl: 1    vitamin D (CHOLECALCIFEROL) 25 MCG (1000 UT) TABS tablet, Take 1 tablet by mouth daily, Disp: 30 tablet, Rfl: 2    furosemide (LASIX) 20 MG tablet, Take 1 tablet by mouth every other day (Patient taking differently: Take 1 tablet by mouth daily), Disp: 30 tablet, Rfl: 0    QUEtiapine (SEROQUEL) 200 MG tablet, Take 0.5 tablets by mouth daily as needed (anxiety/depression) (Patient taking differently: Take 1 tablet by mouth daily as needed (anxiety/depression)), Disp: 30 tablet, Rfl: 0    folic acid (FOLVITE) 1 MG tablet, Take 1 tablet by mouth daily, Disp: , Rfl:     vitamin B-12 (CYANOCOBALAMIN) 500 MCG tablet, Take 1 tablet by mouth daily, Disp: , Rfl:     ondansetron (ZOFRAN) 4 MG tablet,

## 2024-10-15 ENCOUNTER — OFFICE VISIT (OUTPATIENT)
Dept: PRIMARY CARE CLINIC | Age: 49
End: 2024-10-15

## 2024-10-15 VITALS
OXYGEN SATURATION: 98 % | BODY MASS INDEX: 38.92 KG/M2 | DIASTOLIC BLOOD PRESSURE: 88 MMHG | TEMPERATURE: 98.7 F | SYSTOLIC BLOOD PRESSURE: 136 MMHG | HEART RATE: 75 BPM | HEIGHT: 68 IN

## 2024-10-15 DIAGNOSIS — I89.0 LYMPHEDEMA: ICD-10-CM

## 2024-10-15 DIAGNOSIS — R60.0 PERIPHERAL EDEMA: ICD-10-CM

## 2024-10-15 DIAGNOSIS — L03.115 CELLULITIS OF RIGHT LOWER EXTREMITY: ICD-10-CM

## 2024-10-15 DIAGNOSIS — F41.9 ANXIETY: ICD-10-CM

## 2024-10-15 DIAGNOSIS — I89.0 LYMPHEDEMA: Primary | ICD-10-CM

## 2024-10-15 LAB
ANION GAP SERPL CALCULATED.3IONS-SCNC: 10 MMOL/L (ref 7–16)
BUN BLDV-MCNC: 13 MG/DL (ref 6–20)
CALCIUM SERPL-MCNC: 9.5 MG/DL (ref 8.6–10.2)
CHLORIDE BLD-SCNC: 104 MMOL/L (ref 98–107)
CO2: 24 MMOL/L (ref 22–29)
CREAT SERPL-MCNC: 0.6 MG/DL (ref 0.5–1)
GFR, ESTIMATED: >90 ML/MIN/1.73M2
GLUCOSE BLD-MCNC: 113 MG/DL (ref 74–99)
POTASSIUM SERPL-SCNC: 4.5 MMOL/L (ref 3.5–5)
SODIUM BLD-SCNC: 138 MMOL/L (ref 132–146)

## 2024-10-15 RX ORDER — POTASSIUM CHLORIDE 750 MG/1
10 TABLET, EXTENDED RELEASE ORAL DAILY
Qty: 90 TABLET | Refills: 1 | Status: SHIPPED | OUTPATIENT
Start: 2024-10-15 | End: 2025-04-13

## 2024-10-15 RX ORDER — CEPHALEXIN 500 MG/1
500 CAPSULE ORAL 2 TIMES DAILY
Qty: 14 CAPSULE | Refills: 0 | Status: SHIPPED | OUTPATIENT
Start: 2024-10-15 | End: 2024-10-22

## 2024-10-15 RX ORDER — HYDROCHLOROTHIAZIDE 25 MG/1
25 TABLET ORAL EVERY MORNING
Qty: 90 TABLET | Refills: 1 | Status: SHIPPED | OUTPATIENT
Start: 2024-10-15

## 2024-10-15 RX ORDER — FUROSEMIDE 20 MG/1
20 TABLET ORAL DAILY
Qty: 90 TABLET | Refills: 1 | Status: SHIPPED | OUTPATIENT
Start: 2024-10-15

## 2024-10-16 ENCOUNTER — TELEPHONE (OUTPATIENT)
Dept: PRIMARY CARE CLINIC | Age: 49
End: 2024-10-16

## 2024-10-16 RX ORDER — FAMOTIDINE 20 MG/1
20 TABLET, FILM COATED ORAL 2 TIMES DAILY PRN
Qty: 60 TABLET | Refills: 0 | Status: SHIPPED | OUTPATIENT
Start: 2024-10-16

## 2024-10-16 NOTE — TELEPHONE ENCOUNTER
Patient called in stating she spoke to you about getting a stronger medication for heart burn but there was nothing at the pharmacy for her. Please advise.

## 2024-10-23 ASSESSMENT — ENCOUNTER SYMPTOMS
SHORTNESS OF BREATH: 0
DIARRHEA: 0
ABDOMINAL PAIN: 0
CHEST TIGHTNESS: 0
CONSTIPATION: 0
COUGH: 0

## 2024-10-23 NOTE — ASSESSMENT & PLAN NOTE
check BMP.  Increase furosemide to 20 mg daily.  Take potassium chloride in conjunction with this.    Orders:    Basic Metabolic Panel; Future    furosemide (LASIX) 20 MG tablet; Take 1 tablet by mouth daily    potassium chloride (KLOR-CON M) 10 MEQ extended release tablet; Take 1 tablet by mouth daily

## 2024-10-23 NOTE — PROGRESS NOTES
ESTABLISHED PRIMARY CARE VISIT  10/23/24  Name: Tatiana Silveira   : 1975   Age: 48 y.o.  Sex: female    Subjective:     Chief Complaint   Patient presents with    Leg Pain    Leg Swelling     Tatiana Silveira presents to the office for follow up for worsening edema and erythema to the bilateral lower extremities.  This has been intermittent in nature and has slowly progressed.  She does follow with podiatry.  She has not been able to wear compression wraps due to pain.  She does attempt to elevate is much as possible.  She denies any fevers, chills, open wounds or lesions.  She does currently take furosemide on every other day basis.  She denies any chest pain or shortness of breath.  She currently has not participating at the lymphedema clinic.    Review of Systems   Constitutional:  Negative for activity change, appetite change, fatigue, fever and unexpected weight change.   Respiratory:  Negative for cough, chest tightness and shortness of breath.    Cardiovascular:  Positive for leg swelling (lymphedema). Negative for chest pain and palpitations.   Gastrointestinal:  Negative for abdominal pain, constipation and diarrhea.   Genitourinary:  Negative for dysuria.   Skin:  Positive for wound (erythema to BLE, L>R).   Hematological:  Negative for adenopathy.   Psychiatric/Behavioral:  Positive for dysphoric mood. The patient is nervous/anxious.      Medications:     Current Outpatient Medications:     furosemide (LASIX) 20 MG tablet, Take 1 tablet by mouth daily, Disp: 90 tablet, Rfl: 1    potassium chloride (KLOR-CON M) 10 MEQ extended release tablet, Take 1 tablet by mouth daily, Disp: 90 tablet, Rfl: 1    hydroCHLOROthiazide (HYDRODIURIL) 25 MG tablet, Take 1 tablet by mouth every morning, Disp: 90 tablet, Rfl: 1    famotidine (PEPCID) 20 MG tablet, Take 1 tablet by mouth 2 times daily as needed (heartburn), Disp: 60 tablet, Rfl: 0    mineral oil-hydrophilic petrolatum (HYDROPHOR) ointment, Apply topically as

## 2024-10-23 NOTE — ASSESSMENT & PLAN NOTE
borderline controlled his wife is currently in the hospital awaiting a heart transplant.  She is tolerating her medications without side effects.  Denies any suicidal or homicidal ideation.

## 2024-10-28 ENCOUNTER — TELEPHONE (OUTPATIENT)
Dept: PRIMARY CARE CLINIC | Age: 49
End: 2024-10-28

## 2024-10-28 NOTE — TELEPHONE ENCOUNTER
Patient is on the second week of antibiotic and she said she still feels the same. She said she was to call in to let you know how she is feeling. She said that it makes her feel a little nauseous and also kind of lightheaded. She didn't know if she was to keep taking it or not.

## 2024-12-10 ENCOUNTER — OFFICE VISIT (OUTPATIENT)
Dept: PRIMARY CARE CLINIC | Age: 49
End: 2024-12-10

## 2024-12-10 VITALS
SYSTOLIC BLOOD PRESSURE: 146 MMHG | DIASTOLIC BLOOD PRESSURE: 100 MMHG | OXYGEN SATURATION: 96 % | BODY MASS INDEX: 38.92 KG/M2 | HEART RATE: 80 BPM | HEIGHT: 68 IN | TEMPERATURE: 97.8 F

## 2024-12-10 DIAGNOSIS — G80.9 CEREBRAL PALSY, UNSPECIFIED TYPE (HCC): ICD-10-CM

## 2024-12-10 DIAGNOSIS — R60.0 PERIPHERAL EDEMA: ICD-10-CM

## 2024-12-10 DIAGNOSIS — F41.9 ANXIETY: ICD-10-CM

## 2024-12-10 DIAGNOSIS — G43.909 MIGRAINE WITHOUT STATUS MIGRAINOSUS, NOT INTRACTABLE, UNSPECIFIED MIGRAINE TYPE: ICD-10-CM

## 2024-12-10 DIAGNOSIS — I89.0 LYMPHEDEMA: ICD-10-CM

## 2024-12-10 DIAGNOSIS — K21.9 GASTROESOPHAGEAL REFLUX DISEASE WITHOUT ESOPHAGITIS: ICD-10-CM

## 2024-12-10 DIAGNOSIS — L03.119 RECURRENT CELLULITIS OF LOWER EXTREMITY: Primary | ICD-10-CM

## 2024-12-10 LAB
ALBUMIN: 4.3 G/DL (ref 3.5–5.2)
ALP BLD-CCNC: 87 U/L (ref 35–104)
ALT SERPL-CCNC: 10 U/L (ref 0–32)
ANION GAP SERPL CALCULATED.3IONS-SCNC: 9 MMOL/L (ref 7–16)
AST SERPL-CCNC: 14 U/L (ref 0–31)
BASOPHILS ABSOLUTE: 0.02 K/UL (ref 0–0.2)
BASOPHILS RELATIVE PERCENT: 0 % (ref 0–2)
BILIRUB SERPL-MCNC: 0.3 MG/DL (ref 0–1.2)
BILIRUBIN DIRECT: <0.2 MG/DL (ref 0–0.3)
BILIRUBIN, INDIRECT: NORMAL MG/DL (ref 0–1)
BUN BLDV-MCNC: 15 MG/DL (ref 6–20)
CALCIUM SERPL-MCNC: 9.6 MG/DL (ref 8.6–10.2)
CHLORIDE BLD-SCNC: 102 MMOL/L (ref 98–107)
CO2: 26 MMOL/L (ref 22–29)
CREAT SERPL-MCNC: 0.6 MG/DL (ref 0.5–1)
EOSINOPHILS ABSOLUTE: 0.13 K/UL (ref 0.05–0.5)
EOSINOPHILS RELATIVE PERCENT: 2 % (ref 0–6)
GFR, ESTIMATED: >90 ML/MIN/1.73M2
GLUCOSE BLD-MCNC: 104 MG/DL (ref 74–99)
HCT VFR BLD CALC: 35.1 % (ref 34–48)
HEMOGLOBIN: 10.8 G/DL (ref 11.5–15.5)
IMMATURE GRANULOCYTES %: 0 % (ref 0–5)
IMMATURE GRANULOCYTES ABSOLUTE: <0.03 K/UL (ref 0–0.58)
LYMPHOCYTES ABSOLUTE: 1.33 K/UL (ref 1.5–4)
LYMPHOCYTES RELATIVE PERCENT: 17 % (ref 20–42)
MCH RBC QN AUTO: 25.2 PG (ref 26–35)
MCHC RBC AUTO-ENTMCNC: 30.8 G/DL (ref 32–34.5)
MCV RBC AUTO: 81.8 FL (ref 80–99.9)
MONOCYTES ABSOLUTE: 0.37 K/UL (ref 0.1–0.95)
MONOCYTES RELATIVE PERCENT: 5 % (ref 2–12)
NEUTROPHILS ABSOLUTE: 6.04 K/UL (ref 1.8–7.3)
NEUTROPHILS RELATIVE PERCENT: 76 % (ref 43–80)
PDW BLD-RTO: 13.8 % (ref 11.5–15)
PLATELET # BLD: 461 K/UL (ref 130–450)
PMV BLD AUTO: 9.5 FL (ref 7–12)
POTASSIUM SERPL-SCNC: 3.9 MMOL/L (ref 3.5–5)
PREALBUMIN: 19 MG/DL (ref 20–40)
RBC # BLD: 4.29 M/UL (ref 3.5–5.5)
SODIUM BLD-SCNC: 137 MMOL/L (ref 132–146)
TOTAL PROTEIN: 7.9 G/DL (ref 6.4–8.3)
WBC # BLD: 7.9 K/UL (ref 4.5–11.5)

## 2024-12-10 RX ORDER — LISINOPRIL 20 MG/1
20 TABLET ORAL DAILY
Qty: 30 TABLET | Refills: 5 | Status: CANCELLED | OUTPATIENT
Start: 2024-12-10

## 2024-12-10 RX ORDER — FAMOTIDINE 20 MG/1
20 TABLET, FILM COATED ORAL 2 TIMES DAILY PRN
Qty: 60 TABLET | Refills: 0 | Status: CANCELLED | OUTPATIENT
Start: 2024-12-10

## 2024-12-10 RX ORDER — FUROSEMIDE 40 MG/1
40 TABLET ORAL DAILY
Qty: 90 TABLET | Refills: 1 | Status: SHIPPED | OUTPATIENT
Start: 2024-12-10

## 2024-12-10 RX ORDER — CEFDINIR 300 MG/1
300 CAPSULE ORAL 2 TIMES DAILY
Qty: 20 CAPSULE | Refills: 0 | Status: SHIPPED | OUTPATIENT
Start: 2024-12-10 | End: 2024-12-20

## 2024-12-10 RX ORDER — POTASSIUM CHLORIDE 750 MG/1
20 TABLET, EXTENDED RELEASE ORAL DAILY
Qty: 90 TABLET | Refills: 1 | Status: SHIPPED | OUTPATIENT
Start: 2024-12-10 | End: 2025-06-08

## 2024-12-10 RX ORDER — SUCRALFATE 1 G/1
1 TABLET ORAL 3 TIMES DAILY
Qty: 120 TABLET | Refills: 5 | Status: CANCELLED | OUTPATIENT
Start: 2024-12-10

## 2024-12-10 RX ORDER — HYDROPHOR 42 G/100G
OINTMENT TOPICAL
Qty: 454 G | Refills: 2 | Status: CANCELLED | OUTPATIENT
Start: 2024-12-10

## 2024-12-10 RX ORDER — TOPIRAMATE 25 MG/1
25 TABLET, FILM COATED ORAL DAILY
Qty: 30 TABLET | Refills: 3 | Status: CANCELLED | OUTPATIENT
Start: 2024-12-10

## 2024-12-10 RX ORDER — HYDROCHLOROTHIAZIDE 25 MG/1
25 TABLET ORAL EVERY MORNING
Qty: 90 TABLET | Refills: 1 | Status: SHIPPED | OUTPATIENT
Start: 2024-12-10

## 2024-12-10 RX ORDER — ALBUTEROL SULFATE 90 UG/1
2 INHALANT RESPIRATORY (INHALATION) EVERY 6 HOURS PRN
Qty: 1 EACH | Refills: 2 | Status: CANCELLED | OUTPATIENT
Start: 2024-12-10

## 2024-12-10 RX ORDER — MICONAZOLE NITRATE 20 MG/G
CREAM TOPICAL
Qty: 1 EACH | Refills: 1 | Status: CANCELLED | OUTPATIENT
Start: 2024-12-10

## 2024-12-10 RX ORDER — CEFTRIAXONE 1 G/1
1000 INJECTION, POWDER, FOR SOLUTION INTRAMUSCULAR; INTRAVENOUS ONCE
Status: COMPLETED | OUTPATIENT
Start: 2024-12-10 | End: 2024-12-10

## 2024-12-10 RX ORDER — FLUTICASONE PROPIONATE 44 UG/1
2 AEROSOL, METERED RESPIRATORY (INHALATION) 2 TIMES DAILY
Qty: 10.6 G | Refills: 3 | Status: CANCELLED | OUTPATIENT
Start: 2024-12-10

## 2024-12-10 RX ORDER — PANTOPRAZOLE SODIUM 40 MG/1
40 TABLET, DELAYED RELEASE ORAL DAILY
Qty: 90 TABLET | Refills: 1 | Status: CANCELLED | OUTPATIENT
Start: 2024-12-10

## 2024-12-10 RX ADMIN — CEFTRIAXONE 1000 MG: 1 INJECTION, POWDER, FOR SOLUTION INTRAMUSCULAR; INTRAVENOUS at 15:25

## 2024-12-11 ENCOUNTER — APPOINTMENT (OUTPATIENT)
Dept: RADIOLOGY | Facility: HOSPITAL | Age: 49
End: 2024-12-11
Payer: COMMERCIAL

## 2024-12-11 ENCOUNTER — HOSPITAL ENCOUNTER (EMERGENCY)
Facility: HOSPITAL | Age: 49
Discharge: HOME | End: 2024-12-11
Payer: COMMERCIAL

## 2024-12-11 VITALS
OXYGEN SATURATION: 98 % | BODY MASS INDEX: 30.31 KG/M2 | DIASTOLIC BLOOD PRESSURE: 99 MMHG | HEIGHT: 68 IN | RESPIRATION RATE: 18 BRPM | WEIGHT: 200 LBS | SYSTOLIC BLOOD PRESSURE: 157 MMHG | HEART RATE: 86 BPM | TEMPERATURE: 98.2 F

## 2024-12-11 DIAGNOSIS — L03.115 CELLULITIS OF RIGHT LOWER EXTREMITY: ICD-10-CM

## 2024-12-11 DIAGNOSIS — L03.116 CELLULITIS OF LEFT LOWER EXTREMITY: ICD-10-CM

## 2024-12-11 DIAGNOSIS — I89.0 LYMPHEDEMA: Primary | ICD-10-CM

## 2024-12-11 LAB
ALBUMIN SERPL BCP-MCNC: 4.1 G/DL (ref 3.4–5)
ALP SERPL-CCNC: 71 U/L (ref 33–110)
ALT SERPL W P-5'-P-CCNC: 8 U/L (ref 7–45)
ANION GAP SERPL CALC-SCNC: 12 MMOL/L (ref 10–20)
AST SERPL W P-5'-P-CCNC: 15 U/L (ref 9–39)
BASOPHILS # BLD AUTO: 0.02 X10*3/UL (ref 0–0.1)
BASOPHILS NFR BLD AUTO: 0.3 %
BILIRUB SERPL-MCNC: 0.3 MG/DL (ref 0–1.2)
BUN SERPL-MCNC: 12 MG/DL (ref 6–23)
CALCIUM SERPL-MCNC: 9.3 MG/DL (ref 8.6–10.6)
CHLORIDE SERPL-SCNC: 106 MMOL/L (ref 98–107)
CO2 SERPL-SCNC: 25 MMOL/L (ref 21–32)
CREAT SERPL-MCNC: 0.49 MG/DL (ref 0.5–1.05)
CRP SERPL-MCNC: 1.13 MG/DL
EGFRCR SERPLBLD CKD-EPI 2021: >90 ML/MIN/1.73M*2
EOSINOPHIL # BLD AUTO: 0.18 X10*3/UL (ref 0–0.7)
EOSINOPHIL NFR BLD AUTO: 2.6 %
ERYTHROCYTE [DISTWIDTH] IN BLOOD BY AUTOMATED COUNT: 13.8 % (ref 11.5–14.5)
ERYTHROCYTE [SEDIMENTATION RATE] IN BLOOD BY WESTERGREN METHOD: 22 MM/H (ref 0–20)
GLUCOSE SERPL-MCNC: 109 MG/DL (ref 74–99)
HCT VFR BLD AUTO: 33.2 % (ref 36–46)
HGB BLD-MCNC: 10.9 G/DL (ref 12–16)
IMM GRANULOCYTES # BLD AUTO: 0.02 X10*3/UL (ref 0–0.7)
IMM GRANULOCYTES NFR BLD AUTO: 0.3 % (ref 0–0.9)
LYMPHOCYTES # BLD AUTO: 1.41 X10*3/UL (ref 1.2–4.8)
LYMPHOCYTES NFR BLD AUTO: 20.6 %
MCH RBC QN AUTO: 25.1 PG (ref 26–34)
MCHC RBC AUTO-ENTMCNC: 32.8 G/DL (ref 32–36)
MCV RBC AUTO: 76 FL (ref 80–100)
MONOCYTES # BLD AUTO: 0.36 X10*3/UL (ref 0.1–1)
MONOCYTES NFR BLD AUTO: 5.3 %
NEUTROPHILS # BLD AUTO: 4.86 X10*3/UL (ref 1.2–7.7)
NEUTROPHILS NFR BLD AUTO: 70.9 %
NRBC BLD-RTO: 0 /100 WBCS (ref 0–0)
PLATELET # BLD AUTO: 450 X10*3/UL (ref 150–450)
POTASSIUM SERPL-SCNC: 3.3 MMOL/L (ref 3.5–5.3)
PROT SERPL-MCNC: 7.4 G/DL (ref 6.4–8.2)
RBC # BLD AUTO: 4.35 X10*6/UL (ref 4–5.2)
SODIUM SERPL-SCNC: 140 MMOL/L (ref 136–145)
WBC # BLD AUTO: 6.9 X10*3/UL (ref 4.4–11.3)

## 2024-12-11 PROCEDURE — 84075 ASSAY ALKALINE PHOSPHATASE: CPT | Performed by: STUDENT IN AN ORGANIZED HEALTH CARE EDUCATION/TRAINING PROGRAM

## 2024-12-11 PROCEDURE — 93970 EXTREMITY STUDY: CPT

## 2024-12-11 PROCEDURE — 85025 COMPLETE CBC W/AUTO DIFF WBC: CPT | Performed by: STUDENT IN AN ORGANIZED HEALTH CARE EDUCATION/TRAINING PROGRAM

## 2024-12-11 PROCEDURE — 86140 C-REACTIVE PROTEIN: CPT | Performed by: NURSE PRACTITIONER

## 2024-12-11 PROCEDURE — 99284 EMERGENCY DEPT VISIT MOD MDM: CPT | Mod: 25

## 2024-12-11 PROCEDURE — 36415 COLL VENOUS BLD VENIPUNCTURE: CPT | Performed by: STUDENT IN AN ORGANIZED HEALTH CARE EDUCATION/TRAINING PROGRAM

## 2024-12-11 PROCEDURE — 85652 RBC SED RATE AUTOMATED: CPT | Performed by: NURSE PRACTITIONER

## 2024-12-11 PROCEDURE — 93970 EXTREMITY STUDY: CPT | Performed by: RADIOLOGY

## 2024-12-11 PROCEDURE — 2500000005 HC RX 250 GENERAL PHARMACY W/O HCPCS: Performed by: NURSE PRACTITIONER

## 2024-12-11 RX ORDER — BACITRACIN ZINC 500 UNIT/G
OINTMENT IN PACKET (EA) TOPICAL ONCE
Status: COMPLETED | OUTPATIENT
Start: 2024-12-11 | End: 2024-12-11

## 2024-12-11 ASSESSMENT — COLUMBIA-SUICIDE SEVERITY RATING SCALE - C-SSRS
6. HAVE YOU EVER DONE ANYTHING, STARTED TO DO ANYTHING, OR PREPARED TO DO ANYTHING TO END YOUR LIFE?: NO
2. HAVE YOU ACTUALLY HAD ANY THOUGHTS OF KILLING YOURSELF?: NO
1. IN THE PAST MONTH, HAVE YOU WISHED YOU WERE DEAD OR WISHED YOU COULD GO TO SLEEP AND NOT WAKE UP?: NO

## 2024-12-11 ASSESSMENT — PAIN DESCRIPTION - LOCATION: LOCATION: LEG

## 2024-12-11 ASSESSMENT — ENCOUNTER SYMPTOMS
DIARRHEA: 0
ABDOMINAL PAIN: 0
SHORTNESS OF BREATH: 0
NAUSEA: 0
CONSTIPATION: 0
WHEEZING: 0
COUGH: 0

## 2024-12-11 ASSESSMENT — PAIN DESCRIPTION - DESCRIPTORS: DESCRIPTORS: THROBBING

## 2024-12-11 ASSESSMENT — PAIN - FUNCTIONAL ASSESSMENT: PAIN_FUNCTIONAL_ASSESSMENT: 0-10

## 2024-12-11 ASSESSMENT — PAIN DESCRIPTION - PAIN TYPE: TYPE: CHRONIC PAIN

## 2024-12-11 ASSESSMENT — PAIN SCALES - GENERAL: PAINLEVEL_OUTOF10: 7

## 2024-12-11 NOTE — ED PROVIDER NOTES
Emergency Department Encounter  [facility] EMERGENCY MEDICINE    Patient: Sonali Shields  MRN: [id number]  : 1975  Date of Evaluation: 2024  ED Provider: JIM Acevedo      Limitations to history: none  Independent Historian: self  Records reviewed: Care everywhere, paper chart, Inpatient and outpatient notes    Chief Complaint       Chief Complaint   Patient presents with    Leg Swelling     [place]    (Location/Symptom, Timing/Onset, Context/Setting, Quality, Duration, Modifying Factors, Severity) Note limiting factors.   Sonali Shields is a 48 y.o. female with past medical history of cerebral palsy, lymphedema, recurrent cellulitis bilateral lower extremities, who presents to the emergency department complaining of persistent bilateral lower extremity erythema, told by her primary care doctor to get checked out here if symptoms have not improved, has been ongoing for multiple months, given an injection of ceftriaxone yesterday and prescription sent for Omnicef sent to her pharmacy to be taken for the next 10 days.  Patient decided to get checked out here since her wife is getting open heart surgery.  Denies any history of PE or DVT.  Denies any fever, chills, chest pain, shortness of breath, abdominal pain, nausea, vomiting.  Also stating that her symptoms have not improved since she has run out of her gauze and supplies, typically wraps her legs with gauze and Ace wrap.  Also states that at home she used to have a home health nurse that will come change her bandages and her insurance stopped covering for it so they stopped coming.  Patient does report that her dog accidentally scratched her left foot and was having some serous drainage coming from the site, also ran out of her topical antibiotic ointment.    ROS:     Review of Systems  14 systems reviewed and otherwise acutely negative except as in the [place].          Past History   No past medical history on file.  No  past surgical history on file.     Social Drivers of Health     Financial Resource Strain: Low Risk  (6/6/2024)    Received from [facility]    Overall Financial Resource Strain (CARDIA)     Difficulty of Paying Living Expenses: Not hard at all   Food Insecurity: No Food Insecurity (6/6/2024)    Received from [facility]    Hunger Vital Sign     Worried About Running Out of Food in the Last Year: Never true     Ran Out of Food in the Last Year: Never true   Transportation Needs: No Transportation Needs (6/6/2024)    Received from [facility]    PRAPARE - Transportation     Lack of Transportation (Medical): No     Lack of Transportation (Non-Medical): No   Housing Stability: Low Risk  (6/6/2024)    Received from [facility]    Housing Stability Vital Sign     Unable to Pay for Housing in the Last Year: No     Number of Places Lived in the Last Year: 1     Unstable Housing in the Last Year: No       Medications/Allergies     Previous Medications    No medications on file     Allergies   Allergen Reactions    Morphine Hives    Penicillins Hives     Other reaction(s): Hives    Latex Itching and Rash     Other reaction(s): Hives        Physical Exam       ED Triage Vitals [12/11/24 1005]   Temperature Heart Rate Respirations BP   36.8 °C (98.2 °F) 83 16 (!) 186/111      Pulse Ox Temp Source Heart Rate Source Patient Position   97 % Temporal -- --      BP Location FiO2 (%)     -- --         Physical Exam    GENERAL:  The patient appears nourished and normally developed. Vital signs as documented.     HEENT:  Head normocephalic, atraumatic, EOMs intact, PERRLA, Mucous membranes moist. Nares patent without copious rhinorrhea.  No lymphadenopathy.    PULMONARY:  Lungs are clear to auscultation, without any respiratory distress. Able to speak full sentences, no accessory muscle use    CARDIAC:   Normal rate. No murmurs, rubs or  gallops    ABDOMEN:  Soft, non-distended, non-tender, BS positive x 4 quadrants, No rebound or guarding, no peritoneal signs, no CVA tenderness, no masses or organomegaly    MUSCULOSKELETAL:   Pitting edema bilateral lower extremities, with no obvious deformities. Pulses intact distal DP    SKIN:   Edema bilateral feet with localized area of erythema to dorsum left foot over the fifth metatarsal with superficial abrasion consistent with dog scratch            NEURO:  No obvious neurological deficits, normal sensation and strength bilaterally.  Able to follow commands, NIH 0, CN 2-12 intact.        Diagnostics   Labs:  Results for orders placed or performed during the hospital encounter of 12/11/24   CBC and Auto Differential    Collection Time: 12/11/24 11:48 AM   Result Value Ref Range    WBC 6.9 4.4 - 11.3 x10*3/uL    nRBC 0.0 0.0 - 0.0 /100 WBCs    RBC 4.35 4.00 - 5.20 x10*6/uL    Hemoglobin 10.9 (L) 12.0 - 16.0 g/dL    Hematocrit 33.2 (L) 36.0 - 46.0 %    MCV 76 (L) 80 - 100 fL    MCH 25.1 (L) 26.0 - 34.0 pg    MCHC 32.8 32.0 - 36.0 g/dL    RDW 13.8 11.5 - 14.5 %    Platelets 450 150 - 450 x10*3/uL    Neutrophils % 70.9 40.0 - 80.0 %    Immature Granulocytes %, Automated 0.3 0.0 - 0.9 %    Lymphocytes % 20.6 13.0 - 44.0 %    Monocytes % 5.3 2.0 - 10.0 %    Eosinophils % 2.6 0.0 - 6.0 %    Basophils % 0.3 0.0 - 2.0 %    Neutrophils Absolute 4.86 1.20 - 7.70 x10*3/uL    Immature Granulocytes Absolute, Automated 0.02 0.00 - 0.70 x10*3/uL    Lymphocytes Absolute 1.41 1.20 - 4.80 x10*3/uL    Monocytes Absolute 0.36 0.10 - 1.00 x10*3/uL    Eosinophils Absolute 0.18 0.00 - 0.70 x10*3/uL    Basophils Absolute 0.02 0.00 - 0.10 x10*3/uL   Comprehensive metabolic panel    Collection Time: 12/11/24 11:48 AM   Result Value Ref Range    Glucose 109 (H) 74 - 99 mg/dL    Sodium 140 136 - 145 mmol/L    Potassium 3.3 (L) 3.5 - 5.3 mmol/L    Chloride 106 98 - 107 mmol/L    Bicarbonate 25 21 - 32 mmol/L    Anion Gap 12 10 - 20  "mmol/L    Urea Nitrogen 12 6 - 23 mg/dL    Creatinine 0.49 (L) 0.50 - 1.05 mg/dL    eGFR >90 >60 mL/min/1.73m*2    Calcium 9.3 8.6 - 10.6 mg/dL    Albumin 4.1 3.4 - 5.0 g/dL    Alkaline Phosphatase 71 33 - 110 U/L    Total Protein 7.4 6.4 - 8.2 g/dL    AST 15 9 - 39 U/L    Bilirubin, Total 0.3 0.0 - 1.2 mg/dL    ALT 8 7 - 45 U/L   C-reactive protein    Collection Time: 12/11/24 11:48 AM   Result Value Ref Range    C-Reactive Protein 1.13 (H) <1.00 mg/dL   Vascular US Lower Extremity Venous Duplex Bilateral    Collection Time: 12/11/24  2:06 PM   Result Value Ref Range    BSA 2.09 m2     Radiographs:  Vascular US Lower Extremity Venous Duplex Bilateral             Procedures:   Procedures       Assessment   In brief, Sonali Shields is a 48 y.o. female who presented to the emergency department with bilateral lower extremity erythema, edema told by her primary care provider to come in for reevaluation if symptoms do not improve    Plan   IV, lab work, imaging    Differentials   Lymphedema  Peripheral vascular disease  Cellulitis  Necrotizing process    ED Course     Diagnoses as of 12/11/24 1419   Lymphedema   Cellulitis of left lower extremity   Cellulitis of right lower extremity       Visit Vitals  BP (!) 186/111 Comment: history of HTN hasnt taken her meds today   Pulse 83   Temp 36.8 °C (98.2 °F) (Temporal)   Resp 16   Ht 1.727 m (5' 8\")   Wt 90.7 kg (200 lb)   SpO2 97%   BMI 30.41 kg/m²   BSA 2.09 m²       Medications   bacitracin ointment (has no administration in time range)       Plan of care discussed, patient is stable appearing, in no distress, is afebrile.  Is slightly anxious about her wife getting open heart surgery.  Reviewed patient's labs from 2 days ago at her primary care office showing no change in her white blood cell count, patient is afebrile, just received a dose of antibiotics yesterday, no streaking, no crepitus, no purulent drainage, bacitracin applied to wounds and bilateral lower " extremities were wrapped with gauze and Ace wrap's, appearance other than the scratch to the left foot is chronic.  Reviewed patient's records from previous visits.  Sent for ultrasound, negative for DVT.  Patient was advised to continue her current antibiotic regimen as she has not initiated her Omnicef yet, wound care was provided and advised to follow-up with her primary care doctor for prescriptions for additional supplies, does also see podiatry down by where she lives.  Advised to follow-up but given strict return precautions.  Educated on any worsening signs and symptoms to return to the emergency department, patient is agreeable to above plan and is stable for discharge home      Final Impression      1. Lymphedema    2. Cellulitis of left lower extremity    3. Cellulitis of right lower extremity          DISPOSITION  Disposition: Discharge to home  Patient condition is stable    Comment: Please note this report has been produced using speech recognition software and may contain errors related to that system including errors in grammar, punctuation, and spelling, as well as words and phrases that may be inappropriate.  If there are any questions or concerns please feel free to contact the dictating provider for clarification.    JIM Acevedo APRN-CNP  12/11/24 0721

## 2024-12-11 NOTE — ASSESSMENT & PLAN NOTE
The patient is wheelchair bound and cannot stand and pivot at this time.  She is utilizing a Efren lift for all transfers.  The patient is in need of a new electric wheelchair/repair to enhance quality of life and ability to complete ADLs.

## 2024-12-11 NOTE — ASSESSMENT & PLAN NOTE
Poorly controlled.  The patient has failed outpatient therapy and ultimately will need IV diuresis, the patient politely declines ER at this time given she will be going to [facility] tomorrow as her  will have an LVAD placed.  At this time we will increase furosemide to 40 mg daily.  Increase potassium chloride to 20 mill equivalents daily and start the patient on cefdinir.  1 g IM Rocephin was given in office today.  The patient is agreeable that once her  is postop she will present to [facility] emergency department for further evaluation and workup.  If symptoms worsen or progress in the interim, go to the ER immediately.    Orders:    furosemide (LASIX) 40 MG tablet; Take 1 tablet by mouth daily    potassium chloride (KLOR-CON M) 10 MEQ extended release tablet; Take 2 tablets by mouth daily

## 2024-12-11 NOTE — ASSESSMENT & PLAN NOTE
Poorly controlled due to the above.  Continue current medications.  Denies suicidal homicidal ideation.  Relaxation techniques were discussed with the patient today.

## 2024-12-11 NOTE — PROGRESS NOTES
ESTABLISHED PRIMARY CARE VISIT  24  Name: Tatiana Silveira   : 1975   Age: 48 y.o.  Sex: female    Subjective:     Chief Complaint   Patient presents with    Follow-up     Tatiana Silveira presents to the office for follow up on chronic problems.  Patient has a pertinent past medical history of lymphedema and cerebral palsy.  She is wheelchair-bound.  She has had significant bilateral lower extremity edema and erythema over the last few weeks.  Bilateral lower extremities are now weeping, left is greater than the right.  She does follow with podiatry but has not seen podiatry since .  She does not have home health care in the interim and is unable to wrap her legs.  She reports that she is under a lot of stress at this time as her  is currently at [facility] and will be undergoing an LVAD procedure tomorrow.  She is currently taking furosemide 20 mg daily.  She reports that the edema is so severe she is having a difficult time standing and pivoting to transport in and out of the vehicle.  She is currently using a Efren lift at this time. She denies any chest pain, dyspnea or dizziness.     Review of Systems   Constitutional:  Positive for activity change and fatigue. Negative for appetite change, chills and fever.   Respiratory:  Negative for cough, shortness of breath and wheezing.    Cardiovascular:  Positive for leg swelling. Negative for chest pain and palpitations.   Gastrointestinal:  Negative for abdominal pain, constipation, diarrhea and nausea.   Hematological:  Negative for adenopathy.   Psychiatric/Behavioral:  The patient is nervous/anxious.      Medications:     Current Outpatient Medications:     furosemide (LASIX) 40 MG tablet, Take 1 tablet by mouth daily, Disp: 90 tablet, Rfl: 1    hydroCHLOROthiazide (HYDRODIURIL) 25 MG tablet, Take 1 tablet by mouth every morning, Disp: 90 tablet, Rfl: 1    potassium chloride (KLOR-CON M) 10 MEQ extended release tablet, Take 2 tablets

## 2024-12-12 DIAGNOSIS — G80.9 CEREBRAL PALSY, UNSPECIFIED TYPE (HCC): ICD-10-CM

## 2024-12-12 DIAGNOSIS — L03.116 CELLULITIS OF BOTH LOWER EXTREMITIES: ICD-10-CM

## 2024-12-12 DIAGNOSIS — I89.0 LYMPHEDEMA: Primary | ICD-10-CM

## 2024-12-12 DIAGNOSIS — L03.115 CELLULITIS OF BOTH LOWER EXTREMITIES: ICD-10-CM

## 2024-12-27 ENCOUNTER — TELEPHONE (OUTPATIENT)
Dept: PRIMARY CARE CLINIC | Age: 49
End: 2024-12-27

## 2024-12-27 NOTE — TELEPHONE ENCOUNTER
Last Appointment:  12/10/2024  Future Appointments   Date Time Provider Department Center   12/30/2024  3:15 PM Jadiel Woodward DPM [facility]      [facility] called about the newest order - it was signed by Lynda Fischer NP however date of signature was stamped.  They need handwritten date where it has been signed.    Electronically signed by Brigida Mckinnon LPN on 12/27/2024 at 12:06 PM

## 2024-12-30 ENCOUNTER — OFFICE VISIT (OUTPATIENT)
Dept: FAMILY MEDICINE CLINIC | Age: 49
End: 2024-12-30
Payer: COMMERCIAL

## 2024-12-30 ENCOUNTER — OFFICE VISIT (OUTPATIENT)
Dept: PODIATRY | Age: 49
End: 2024-12-30

## 2024-12-30 VITALS — HEART RATE: 76 BPM | RESPIRATION RATE: 18 BRPM | OXYGEN SATURATION: 97 % | TEMPERATURE: 97.6 F

## 2024-12-30 VITALS — WEIGHT: 256 LBS | BODY MASS INDEX: 38.8 KG/M2 | HEIGHT: 68 IN

## 2024-12-30 DIAGNOSIS — U07.1 COVID-19: Primary | ICD-10-CM

## 2024-12-30 DIAGNOSIS — I89.0 LYMPHEDEMA: ICD-10-CM

## 2024-12-30 DIAGNOSIS — R52 BODY ACHES: ICD-10-CM

## 2024-12-30 DIAGNOSIS — B35.1 TINEA UNGUIUM: Primary | ICD-10-CM

## 2024-12-30 DIAGNOSIS — L84 CORNS AND CALLOSITIES: ICD-10-CM

## 2024-12-30 DIAGNOSIS — G60.8 HEREDITARY SENSORY NEUROPATHY: ICD-10-CM

## 2024-12-30 DIAGNOSIS — G80.9 CEREBRAL PALSY, UNSPECIFIED TYPE (HCC): ICD-10-CM

## 2024-12-30 LAB
INFLUENZA A ANTIBODY: NEGATIVE
INFLUENZA B ANTIBODY: NEGATIVE
Lab: ABNORMAL
PERFORMING INSTRUMENT: ABNORMAL
QC PASS/FAIL: ABNORMAL
SARS-COV-2, POC: DETECTED

## 2024-12-30 PROCEDURE — 87804 INFLUENZA ASSAY W/OPTIC: CPT | Performed by: NURSE PRACTITIONER

## 2024-12-30 PROCEDURE — G2211 COMPLEX E/M VISIT ADD ON: HCPCS | Performed by: NURSE PRACTITIONER

## 2024-12-30 PROCEDURE — G8417 CALC BMI ABV UP PARAM F/U: HCPCS | Performed by: NURSE PRACTITIONER

## 2024-12-30 PROCEDURE — 99213 OFFICE O/P EST LOW 20 MIN: CPT | Performed by: NURSE PRACTITIONER

## 2024-12-30 PROCEDURE — G8484 FLU IMMUNIZE NO ADMIN: HCPCS | Performed by: NURSE PRACTITIONER

## 2024-12-30 PROCEDURE — G8427 DOCREV CUR MEDS BY ELIG CLIN: HCPCS | Performed by: NURSE PRACTITIONER

## 2024-12-30 PROCEDURE — 87426 SARSCOV CORONAVIRUS AG IA: CPT | Performed by: NURSE PRACTITIONER

## 2024-12-30 PROCEDURE — 1036F TOBACCO NON-USER: CPT | Performed by: NURSE PRACTITIONER

## 2024-12-30 RX ORDER — POTASSIUM CHLORIDE 1500 MG/1
20 TABLET, EXTENDED RELEASE ORAL DAILY
Qty: 30 TABLET | Refills: 5 | Status: SHIPPED | OUTPATIENT
Start: 2024-12-30

## 2024-12-30 NOTE — PROGRESS NOTES
ACUTE PRIMARY CARE VISIT  24  Name: Tatiana Silveira   : 1975   Age: 49 y.o.  Sex: female   Chief Complaint   Patient presents with    Fever    Generalized Body Aches     HPI:     History of Present Illness  The patient is a 49-year-old female who presents with congestion.    She reports experiencing nasal congestion, rhinorrhea, and general malaise since [place]. She also mentions an episode of fever last night, with a peak temperature of approximately 101 degrees. She has been managing her symptoms with Tylenol and maintaining hydration through increased fluid intake. Additionally, she notes that her daughter had been ill around the same time as the onset of her own symptoms. She also reports a headache, which she initially dismissed due to her history of chronic headaches. She has been experiencing weakness in her legs, a symptom that typically worsens in the absence of antibiotic treatment. She sought medical attention from Dr. Woodward, who provided wound care for her legs. She suspects that her current illness may be related to her antibiotic medication, which she discontinued a few days ago. She also reports a recent episode of fever, although her temperature was recorded as 97 degrees during this visit.    She is currently on a regimen of hydrochlorothiazide and Lasix 40 mg. However, she has not yet received her prescribed potassium medication. She also reports experiencing pain in the right side of her back, but is uncertain if this is a side effect of her Lasix medication or indicative of low potassium levels.    MEDICATIONS  Current: Tylenol, hydrochlorothiazide, Lasix  Objective:     Vitals:    24 1623   Pulse: 76   Resp: 18   Temp: 97.6 °F (36.4 °C)   SpO2: 97%   Weight: Comment: in Wheelchair     Physical Exam  Lungs were auscultated.  Physical Exam  Vitals and nursing note reviewed.   Constitutional:       Appearance: Normal appearance. She is normal weight.   HENT:      Head:

## 2024-12-30 NOTE — PROGRESS NOTES
Take by mouth, Disp: , Rfl:     miSOPROStol (CYTOTEC) 100 MCG tablet, Take 1 tablet by mouth in the morning and at bedtime, Disp: , Rfl:     fluticasone (FLOVENT HFA) 44 MCG/ACT inhaler, Inhale 2 puffs into the lungs 2 times daily, Disp: 10.6 g, Rfl: 3    sucralfate (CARAFATE) 1 GM tablet, Take 1 tablet by mouth 3 times daily, Disp: 120 tablet, Rfl: 5    albuterol sulfate  (90 Base) MCG/ACT inhaler, Inhale 2 puffs into the lungs every 6 hours as needed for Wheezing, Disp: 1 Inhaler, Rfl: 2    topiramate (TOPAMAX) 25 MG tablet, Take 1 tablet by mouth daily, Disp: 30 tablet, Rfl: 3    fluticasone (FLONASE) 50 MCG/ACT nasal spray, 2 sprays by Each Nostril route daily 2 sprays each nostril daily, Disp: 1 Bottle, Rfl: 5    Handicap Placard MISC, by Does not apply route Patient cannot walk 200 ft without stopping to rest.   Expiration 09/02/2025., Disp: 1 each, Rfl: 0    gabapentin (NEURONTIN) 300 MG capsule, Take 1 capsule by mouth 3 times daily., Disp: , Rfl:     potassium chloride (KLOR-CON M) 20 MEQ extended release tablet, Take 1 tablet by mouth daily, Disp: 30 tablet, Rfl: 5    SUMAtriptan (IMITREX) 25 MG tablet, Take 1 tablet by mouth once as needed for Migraine, Disp: 9 tablet, Rfl: 5  Allergies   Allergen Reactions    Latex Itching and Rash     Other reaction(s): Hives    Morphine Hives    Morphine Hives    Pcn [Penicillins]     Penicillin V      Other reaction(s): Hives    Penicillins Hives       Past Medical History:   Diagnosis Date    Cerebral palsy (HCC)     Cobalamin deficiency 05/16/2024    Depressive disorder 05/16/2024    Iron deficiency anemia 05/16/2024    Lymphedema     Obesity 05/16/2024     There were no vitals filed for this visit.     Work History/Social History:   Foot and ankle history:     Focused Lower Extremity Physical Exam:    Neurovascular examination:    Dorsalis Pedis palpable bilateral.  Posterior tibialis non-palpable bilateral.    Capillary Refill Time:  Immediate return  Hair

## 2025-01-28 ENCOUNTER — TELEPHONE (OUTPATIENT)
Dept: PRIMARY CARE CLINIC | Age: 50
End: 2025-01-28

## 2025-01-28 NOTE — TELEPHONE ENCOUNTER
Last Appointment:  12/30/2024  Future Appointments   Date Time Provider Department Center   2/24/2025  3:30 PM Jadiel Woodward, [facility]      Patient called she needs letter stating she is wheelchair bound and has cerebral palsy.  She is trying to get [employer] to help her get a handicap van.    Electronically signed by Brigida Mckinnon LPN on 1/28/2025 at 9:26 AM

## 2025-01-28 NOTE — TELEPHONE ENCOUNTER
Patient called she would like letter to be given to her wife Carolynn Silveira or daughter who are on their way in.

## 2025-03-25 ENCOUNTER — OFFICE VISIT (OUTPATIENT)
Dept: FAMILY MEDICINE CLINIC | Age: 50
End: 2025-03-25
Payer: COMMERCIAL

## 2025-03-25 VITALS
DIASTOLIC BLOOD PRESSURE: 90 MMHG | TEMPERATURE: 97.2 F | OXYGEN SATURATION: 99 % | HEIGHT: 68 IN | HEART RATE: 82 BPM | BODY MASS INDEX: 38.8 KG/M2 | SYSTOLIC BLOOD PRESSURE: 144 MMHG | RESPIRATION RATE: 18 BRPM | WEIGHT: 256 LBS

## 2025-03-25 DIAGNOSIS — I83.029 VENOUS ULCERS OF BOTH LOWER EXTREMITIES: Primary | ICD-10-CM

## 2025-03-25 DIAGNOSIS — L97.929 VENOUS ULCERS OF BOTH LOWER EXTREMITIES: Primary | ICD-10-CM

## 2025-03-25 DIAGNOSIS — I89.0 LYMPHEDEMA: ICD-10-CM

## 2025-03-25 DIAGNOSIS — I83.019 VENOUS ULCERS OF BOTH LOWER EXTREMITIES: Primary | ICD-10-CM

## 2025-03-25 DIAGNOSIS — L97.919 VENOUS ULCERS OF BOTH LOWER EXTREMITIES: Primary | ICD-10-CM

## 2025-03-25 DIAGNOSIS — R60.0 PERIPHERAL EDEMA: ICD-10-CM

## 2025-03-25 DIAGNOSIS — K21.9 GASTROESOPHAGEAL REFLUX DISEASE WITHOUT ESOPHAGITIS: ICD-10-CM

## 2025-03-25 DIAGNOSIS — J45.30 MILD PERSISTENT ASTHMA WITHOUT COMPLICATION: ICD-10-CM

## 2025-03-25 PROCEDURE — 1036F TOBACCO NON-USER: CPT | Performed by: PHYSICIAN ASSISTANT

## 2025-03-25 PROCEDURE — 99213 OFFICE O/P EST LOW 20 MIN: CPT | Performed by: PHYSICIAN ASSISTANT

## 2025-03-25 PROCEDURE — G8417 CALC BMI ABV UP PARAM F/U: HCPCS | Performed by: PHYSICIAN ASSISTANT

## 2025-03-25 PROCEDURE — G8427 DOCREV CUR MEDS BY ELIG CLIN: HCPCS | Performed by: PHYSICIAN ASSISTANT

## 2025-03-25 RX ORDER — HYDROCHLOROTHIAZIDE 25 MG/1
25 TABLET ORAL EVERY MORNING
Qty: 90 TABLET | Refills: 1 | Status: SHIPPED | OUTPATIENT
Start: 2025-03-25

## 2025-03-25 RX ORDER — POTASSIUM CHLORIDE 1500 MG/1
20 TABLET, EXTENDED RELEASE ORAL DAILY
Qty: 30 TABLET | Refills: 5 | Status: SHIPPED | OUTPATIENT
Start: 2025-03-25

## 2025-03-25 RX ORDER — SUCRALFATE 1 G/1
1 TABLET ORAL 3 TIMES DAILY
Qty: 120 TABLET | Refills: 5 | Status: SHIPPED | OUTPATIENT
Start: 2025-03-25

## 2025-03-25 RX ORDER — PANTOPRAZOLE SODIUM 40 MG/1
40 TABLET, DELAYED RELEASE ORAL DAILY
Qty: 90 TABLET | Refills: 1 | Status: SHIPPED | OUTPATIENT
Start: 2025-03-25

## 2025-03-25 RX ORDER — LISINOPRIL 20 MG/1
20 TABLET ORAL DAILY
Qty: 30 TABLET | Refills: 5 | Status: SHIPPED | OUTPATIENT
Start: 2025-03-25

## 2025-03-25 RX ORDER — FLUTICASONE PROPIONATE 44 UG/1
2 AEROSOL, METERED RESPIRATORY (INHALATION) 2 TIMES DAILY
Qty: 10.6 G | Refills: 3 | Status: SHIPPED | OUTPATIENT
Start: 2025-03-25

## 2025-03-25 RX ORDER — FUROSEMIDE 40 MG/1
40 TABLET ORAL DAILY
Qty: 90 TABLET | Refills: 1 | Status: SHIPPED | OUTPATIENT
Start: 2025-03-25

## 2025-03-25 RX ORDER — CEFDINIR 300 MG/1
300 CAPSULE ORAL 2 TIMES DAILY
Qty: 20 CAPSULE | Refills: 0 | Status: SHIPPED | OUTPATIENT
Start: 2025-03-25 | End: 2025-04-04

## 2025-03-25 RX ORDER — FAMOTIDINE 20 MG/1
20 TABLET, FILM COATED ORAL 2 TIMES DAILY PRN
Qty: 60 TABLET | Refills: 0 | Status: SHIPPED | OUTPATIENT
Start: 2025-03-25

## 2025-03-25 ASSESSMENT — ENCOUNTER SYMPTOMS
COUGH: 0
DIARRHEA: 0
NAUSEA: 0
SORE THROAT: 0
ABDOMINAL PAIN: 0
BACK PAIN: 0
PHOTOPHOBIA: 0
SHORTNESS OF BREATH: 0
VOMITING: 0

## 2025-03-25 NOTE — PROGRESS NOTES
3/25/25  Tatiana Silveira : 1975 Sex: female  Age 49 y.o.      Subjective:  Chief Complaint   Patient presents with    Other         49-year-old female presents to the walk-in clinic for a wound evaluation.  Patient has chronic lymphedema and deals with venous ulcers quite frequently.  Her last infection was treated back in December.  She has been having intermittent home health over the years but states that now her current home health agency does not accept care source.  A referral has been placed for [facility] and she was on their schedule to begin  but they have not showed up.  Patient has her daughter wrap address her legs every other day.  She uses an antifungal athletes foot cream.  Patient has denied fever, chills, nausea or vomiting.  Patient is confined to a wheelchair due to her CP.            Review of Systems   Constitutional:  Negative for chills and fever.   HENT:  Negative for congestion, ear pain and sore throat.    Eyes:  Negative for photophobia and visual disturbance.   Respiratory:  Negative for cough and shortness of breath.    Cardiovascular:  Negative for chest pain.   Gastrointestinal:  Negative for abdominal pain, diarrhea, nausea and vomiting.   Genitourinary:  Negative for difficulty urinating, dysuria, frequency and urgency.   Musculoskeletal:  Negative for back pain, neck pain and neck stiffness.   Skin:  Positive for wound. Negative for rash.   Neurological:  Negative for dizziness, syncope, weakness, light-headedness and headaches.   Hematological:  Negative for adenopathy. Does not bruise/bleed easily.   Psychiatric/Behavioral:  Negative for agitation and confusion.    All other systems reviewed and are negative.        PMH:     Past Medical History:   Diagnosis Date    Cerebral palsy (HCC)     Cobalamin deficiency 2024    Depressive disorder 2024    Iron deficiency anemia 2024    Lymphedema     Obesity 2024       Past Surgical History:

## 2025-03-26 ENCOUNTER — TELEPHONE (OUTPATIENT)
Dept: PRIMARY CARE CLINIC | Age: 50
End: 2025-03-26

## 2025-03-26 NOTE — TELEPHONE ENCOUNTER
Patient was in the office yesterday with multiple requests.   She states her potassium pill is making her nauseous and would like zofran.   Also would like an order for an external catheter sent to [facility].   Also requesting referral to home care.  Patient states that the last time it was ordered they refused to see her, per the referral patient chose outpatient PT which disqualifies her for home care but she is requesting it at this time.

## 2025-04-08 ENCOUNTER — OFFICE VISIT (OUTPATIENT)
Dept: PRIMARY CARE CLINIC | Age: 50
End: 2025-04-08

## 2025-04-08 VITALS
DIASTOLIC BLOOD PRESSURE: 78 MMHG | OXYGEN SATURATION: 97 % | BODY MASS INDEX: 38.8 KG/M2 | TEMPERATURE: 98.3 F | SYSTOLIC BLOOD PRESSURE: 112 MMHG | WEIGHT: 256 LBS | HEART RATE: 88 BPM | HEIGHT: 68 IN

## 2025-04-08 DIAGNOSIS — L03.115 BILATERAL CELLULITIS OF LOWER LEG: ICD-10-CM

## 2025-04-08 DIAGNOSIS — I89.0 LYMPHEDEMA: ICD-10-CM

## 2025-04-08 DIAGNOSIS — D50.8 OTHER IRON DEFICIENCY ANEMIA: ICD-10-CM

## 2025-04-08 DIAGNOSIS — K58.0 IRRITABLE BOWEL SYNDROME WITH DIARRHEA: ICD-10-CM

## 2025-04-08 DIAGNOSIS — E53.8 COBALAMIN DEFICIENCY: ICD-10-CM

## 2025-04-08 DIAGNOSIS — K21.9 GASTROESOPHAGEAL REFLUX DISEASE WITHOUT ESOPHAGITIS: ICD-10-CM

## 2025-04-08 DIAGNOSIS — D64.9 ANEMIA, UNSPECIFIED TYPE: ICD-10-CM

## 2025-04-08 DIAGNOSIS — K58.0 IRRITABLE BOWEL SYNDROME WITH DIARRHEA: Primary | ICD-10-CM

## 2025-04-08 DIAGNOSIS — L03.116 BILATERAL CELLULITIS OF LOWER LEG: ICD-10-CM

## 2025-04-08 DIAGNOSIS — F32.A DEPRESSIVE DISORDER: ICD-10-CM

## 2025-04-08 DIAGNOSIS — F51.01 PRIMARY INSOMNIA: ICD-10-CM

## 2025-04-08 DIAGNOSIS — E66.812 CLASS 2 SEVERE OBESITY DUE TO EXCESS CALORIES WITH SERIOUS COMORBIDITY AND BODY MASS INDEX (BMI) OF 38.0 TO 38.9 IN ADULT: ICD-10-CM

## 2025-04-08 DIAGNOSIS — N39.41 URGENCY INCONTINENCE: ICD-10-CM

## 2025-04-08 DIAGNOSIS — R11.0 NAUSEA: ICD-10-CM

## 2025-04-08 DIAGNOSIS — G80.9 CEREBRAL PALSY, UNSPECIFIED TYPE (HCC): ICD-10-CM

## 2025-04-08 DIAGNOSIS — F41.9 ANXIETY: ICD-10-CM

## 2025-04-08 DIAGNOSIS — E66.01 CLASS 2 SEVERE OBESITY DUE TO EXCESS CALORIES WITH SERIOUS COMORBIDITY AND BODY MASS INDEX (BMI) OF 38.0 TO 38.9 IN ADULT: ICD-10-CM

## 2025-04-08 LAB
ALBUMIN: 4.2 G/DL (ref 3.5–5.2)
ALP BLD-CCNC: 91 U/L (ref 35–104)
ALT SERPL-CCNC: 10 U/L (ref 0–32)
ANION GAP SERPL CALCULATED.3IONS-SCNC: 12 MMOL/L (ref 7–16)
AST SERPL-CCNC: 14 U/L (ref 0–31)
BASOPHILS ABSOLUTE: 0.04 K/UL (ref 0–0.2)
BASOPHILS RELATIVE PERCENT: 1 % (ref 0–2)
BILIRUB SERPL-MCNC: 0.3 MG/DL (ref 0–1.2)
BUN BLDV-MCNC: 14 MG/DL (ref 6–20)
CALCIUM SERPL-MCNC: 9.6 MG/DL (ref 8.6–10.2)
CHLORIDE BLD-SCNC: 103 MMOL/L (ref 98–107)
CHOLESTEROL, TOTAL: 201 MG/DL
CO2: 25 MMOL/L (ref 22–29)
CREAT SERPL-MCNC: 0.6 MG/DL (ref 0.5–1)
EOSINOPHILS ABSOLUTE: 0.33 K/UL (ref 0.05–0.5)
EOSINOPHILS RELATIVE PERCENT: 4 % (ref 0–6)
FERRITIN: 21 NG/ML
FOLATE: 11 NG/ML (ref 4.8–24.2)
GFR, ESTIMATED: >90 ML/MIN/1.73M2
GLUCOSE BLD-MCNC: 104 MG/DL (ref 74–99)
HCT VFR BLD CALC: 36.7 % (ref 34–48)
HDLC SERPL-MCNC: 55 MG/DL
HEMOGLOBIN: 11.3 G/DL (ref 11.5–15.5)
IMMATURE GRANULOCYTES %: 0 % (ref 0–5)
IMMATURE GRANULOCYTES ABSOLUTE: <0.03 K/UL (ref 0–0.58)
IRON % SATURATION: 11 % (ref 15–50)
IRON: 44 UG/DL (ref 37–145)
LDL CHOLESTEROL: 119 MG/DL
LYMPHOCYTES ABSOLUTE: 1.34 K/UL (ref 1.5–4)
LYMPHOCYTES RELATIVE PERCENT: 16 % (ref 20–42)
MCH RBC QN AUTO: 24.4 PG (ref 26–35)
MCHC RBC AUTO-ENTMCNC: 30.8 G/DL (ref 32–34.5)
MCV RBC AUTO: 79.3 FL (ref 80–99.9)
MONOCYTES ABSOLUTE: 0.46 K/UL (ref 0.1–0.95)
MONOCYTES RELATIVE PERCENT: 6 % (ref 2–12)
NEUTROPHILS ABSOLUTE: 5.99 K/UL (ref 1.8–7.3)
NEUTROPHILS RELATIVE PERCENT: 73 % (ref 43–80)
PDW BLD-RTO: 14.9 % (ref 11.5–15)
PLATELET # BLD: 398 K/UL (ref 130–450)
PMV BLD AUTO: 9.3 FL (ref 7–12)
POTASSIUM SERPL-SCNC: 4 MMOL/L (ref 3.5–5)
RBC # BLD: 4.63 M/UL (ref 3.5–5.5)
SODIUM BLD-SCNC: 140 MMOL/L (ref 132–146)
TOTAL IRON BINDING CAPACITY: 392 UG/DL (ref 250–450)
TOTAL PROTEIN: 8.1 G/DL (ref 6.4–8.3)
TRIGL SERPL-MCNC: 133 MG/DL
TSH SERPL DL<=0.05 MIU/L-ACNC: 1.6 UIU/ML (ref 0.27–4.2)
VITAMIN B-12: 439 PG/ML (ref 211–946)
VITAMIN D 25-HYDROXY: 10.1 NG/ML (ref 30–100)
VLDLC SERPL CALC-MCNC: 27 MG/DL
WBC # BLD: 8.2 K/UL (ref 4.5–11.5)

## 2025-04-08 RX ORDER — SUMATRIPTAN SUCCINATE 25 MG/1
25 TABLET ORAL
Qty: 9 TABLET | Refills: 1 | Status: SHIPPED | OUTPATIENT
Start: 2025-04-08

## 2025-04-08 RX ORDER — PANTOPRAZOLE SODIUM 40 MG/1
40 TABLET, DELAYED RELEASE ORAL DAILY
Qty: 90 TABLET | Refills: 1 | Status: SHIPPED | OUTPATIENT
Start: 2025-04-08

## 2025-04-08 RX ORDER — SUCRALFATE 1 G/1
1 TABLET ORAL 3 TIMES DAILY
Qty: 120 TABLET | Refills: 2 | Status: SHIPPED | OUTPATIENT
Start: 2025-04-08

## 2025-04-08 RX ORDER — FAMOTIDINE 20 MG/1
20 TABLET, FILM COATED ORAL 2 TIMES DAILY PRN
Qty: 60 TABLET | Refills: 0 | Status: SHIPPED | OUTPATIENT
Start: 2025-04-08

## 2025-04-08 RX ORDER — ZOLPIDEM TARTRATE 10 MG/1
10 TABLET ORAL NIGHTLY PRN
COMMUNITY
End: 2025-04-08

## 2025-04-08 RX ORDER — TRAZODONE HYDROCHLORIDE 50 MG/1
50 TABLET ORAL NIGHTLY PRN
Qty: 90 TABLET | Refills: 1 | Status: SHIPPED | OUTPATIENT
Start: 2025-04-08

## 2025-04-08 RX ORDER — GABAPENTIN 300 MG/1
300 CAPSULE ORAL NIGHTLY
Qty: 30 CAPSULE | Refills: 2 | Status: SHIPPED | OUTPATIENT
Start: 2025-04-08 | End: 2025-07-07

## 2025-04-08 RX ORDER — TOPIRAMATE 25 MG/1
25 TABLET, FILM COATED ORAL NIGHTLY
Qty: 90 TABLET | Refills: 1 | Status: SHIPPED | OUTPATIENT
Start: 2025-04-08

## 2025-04-08 RX ORDER — ALPRAZOLAM 1 MG/1
2 TABLET ORAL 3 TIMES DAILY PRN
COMMUNITY
End: 2025-04-08

## 2025-04-08 RX ORDER — ONDANSETRON 4 MG/1
4 TABLET, FILM COATED ORAL EVERY 8 HOURS PRN
Qty: 30 TABLET | Refills: 5 | Status: SHIPPED | OUTPATIENT
Start: 2025-04-08 | End: 2025-06-07

## 2025-04-08 SDOH — ECONOMIC STABILITY: FOOD INSECURITY: WITHIN THE PAST 12 MONTHS, THE FOOD YOU BOUGHT JUST DIDN'T LAST AND YOU DIDN'T HAVE MONEY TO GET MORE.: NEVER TRUE

## 2025-04-08 SDOH — ECONOMIC STABILITY: FOOD INSECURITY: WITHIN THE PAST 12 MONTHS, YOU WORRIED THAT YOUR FOOD WOULD RUN OUT BEFORE YOU GOT MONEY TO BUY MORE.: NEVER TRUE

## 2025-04-08 ASSESSMENT — PATIENT HEALTH QUESTIONNAIRE - PHQ9
SUM OF ALL RESPONSES TO PHQ QUESTIONS 1-9: 7
10. IF YOU CHECKED OFF ANY PROBLEMS, HOW DIFFICULT HAVE THESE PROBLEMS MADE IT FOR YOU TO DO YOUR WORK, TAKE CARE OF THINGS AT HOME, OR GET ALONG WITH OTHER PEOPLE: NOT DIFFICULT AT ALL
5. POOR APPETITE OR OVEREATING: SEVERAL DAYS
1. LITTLE INTEREST OR PLEASURE IN DOING THINGS: SEVERAL DAYS
9. THOUGHTS THAT YOU WOULD BE BETTER OFF DEAD, OR OF HURTING YOURSELF: NOT AT ALL
8. MOVING OR SPEAKING SO SLOWLY THAT OTHER PEOPLE COULD HAVE NOTICED. OR THE OPPOSITE, BEING SO FIGETY OR RESTLESS THAT YOU HAVE BEEN MOVING AROUND A LOT MORE THAN USUAL: NOT AT ALL
7. TROUBLE CONCENTRATING ON THINGS, SUCH AS READING THE NEWSPAPER OR WATCHING TELEVISION: SEVERAL DAYS
SUM OF ALL RESPONSES TO PHQ QUESTIONS 1-9: 7
SUM OF ALL RESPONSES TO PHQ QUESTIONS 1-9: 7
6. FEELING BAD ABOUT YOURSELF - OR THAT YOU ARE A FAILURE OR HAVE LET YOURSELF OR YOUR FAMILY DOWN: SEVERAL DAYS
2. FEELING DOWN, DEPRESSED OR HOPELESS: SEVERAL DAYS
SUM OF ALL RESPONSES TO PHQ QUESTIONS 1-9: 7
3. TROUBLE FALLING OR STAYING ASLEEP: SEVERAL DAYS
4. FEELING TIRED OR HAVING LITTLE ENERGY: SEVERAL DAYS

## 2025-04-09 ENCOUNTER — RESULTS FOLLOW-UP (OUTPATIENT)
Dept: FAMILY MEDICINE CLINIC | Age: 50
End: 2025-04-09

## 2025-04-09 RX ORDER — ERGOCALCIFEROL 1.25 MG/1
50000 CAPSULE, LIQUID FILLED ORAL WEEKLY
Qty: 4 CAPSULE | Refills: 3 | Status: SHIPPED | OUTPATIENT
Start: 2025-04-09

## 2025-04-11 ENCOUNTER — TELEPHONE (OUTPATIENT)
Dept: PRIMARY CARE CLINIC | Age: 50
End: 2025-04-11

## 2025-04-11 NOTE — TELEPHONE ENCOUNTER
Patient called her insurance company to see where the catheter could be sent. They told her to [facility]. It shows on their website that's where it should go.

## 2025-04-12 ASSESSMENT — ENCOUNTER SYMPTOMS
CONSTIPATION: 0
COLOR CHANGE: 0
DIARRHEA: 0
NAUSEA: 0
APNEA: 0
COUGH: 0
VOMITING: 0
CHEST TIGHTNESS: 0
EYES NEGATIVE: 1
BACK PAIN: 0
RHINORRHEA: 0
ABDOMINAL DISTENTION: 0
WHEEZING: 0
ABDOMINAL PAIN: 0
SHORTNESS OF BREATH: 0
VOICE CHANGE: 0
FACIAL SWELLING: 0

## 2025-04-12 NOTE — PROGRESS NOTES
ESTABLISHED PRIMARY CARE VISIT  25  Name: Tatiana Silveira   : 1975   Age: 49 y.o.  Sex: female   Chief Complaint   Patient presents with    Follow-up     HPI:     History of Present Illness  The patient is a 49-year-old female who presents for follow-up.    Severe foot pain/ lymphedema, recurrent cellulitis.  - Managed with antibiotics, not yet started cefdinir despite recent diagnosis of BL cellulitis.   - Previous antibiotics caused significant GI discomfort, including persistent vomiting  - Taking Lasix 40 mg and hydrochlorothiazide 25 mg  - Follows with podiatry sporadically.   - Occasionally applied compression wraps.       Gastrointestinal issues  - Known IBS, previously prescribed Zofran  - Previous endoscopy showed delayed gastric emptying  - Constant fullness and lack of appetite, managed by poor appetite.   - Not actively following with GI, Dr. Mullen has completed scoped in the past.   - Currently taking famotidine, ondansetron, pantoprazole and Weinberg Carafate.    Overactive bladder with urinary frequency and urgency  - Requests external catheter for wound care  - Occasional urinary incontinence, uses portable urinal but finds it difficult due to diuretics    Leg pain and restless leg syndrome  - Reports leg pain, off gabapentin for some time, requesting refill.  - Experiences restless leg syndrome with involuntary jerking  - Iron studies stable.     Headaches and sleep issues  - Off Topamax for 2 weeks, ran out of supply. Though this medication has not been filled in quite some time.   - Relief from sumatriptan, needs refill.   - Reports chronic headaches, no history of seizures  - incomplete sleep study in the past.   - Not on Ambien since 2024, reports difficulty sleeping without it, requesting refill.     Supplemental information: Under Dr. Oliva's psychiatric care, arranging transportation for appointments. Taking folic acid and vitamin B12 supplements.  Subjective:   Review of Systems

## 2025-04-15 ENCOUNTER — TELEPHONE (OUTPATIENT)
Dept: PRIMARY CARE CLINIC | Age: 50
End: 2025-04-15

## 2025-04-15 NOTE — TELEPHONE ENCOUNTER
Lynda from [facility] called. She said she got an order for external female caths and they do not carry those. She said she assumes you were trying to order the Purewick. She said that those need to be ordered through [employer] bc no other manufacturers make them really. She said you should be able to look up the [employer] Purewick site and it will tell you how to order.

## 2025-06-06 ENCOUNTER — OFFICE VISIT (OUTPATIENT)
Dept: FAMILY MEDICINE CLINIC | Age: 50
End: 2025-06-06
Payer: COMMERCIAL

## 2025-06-06 VITALS
HEART RATE: 77 BPM | RESPIRATION RATE: 18 BRPM | BODY MASS INDEX: 38.8 KG/M2 | OXYGEN SATURATION: 97 % | HEIGHT: 68 IN | DIASTOLIC BLOOD PRESSURE: 82 MMHG | WEIGHT: 256 LBS | SYSTOLIC BLOOD PRESSURE: 126 MMHG | TEMPERATURE: 98.3 F

## 2025-06-06 DIAGNOSIS — I89.0 CHRONIC ACQUIRED LYMPHEDEMA: Primary | ICD-10-CM

## 2025-06-06 DIAGNOSIS — L03.115 CELLULITIS OF RIGHT LEG WITHOUT FOOT: ICD-10-CM

## 2025-06-06 PROCEDURE — G8417 CALC BMI ABV UP PARAM F/U: HCPCS | Performed by: EMERGENCY MEDICINE

## 2025-06-06 PROCEDURE — 1036F TOBACCO NON-USER: CPT | Performed by: EMERGENCY MEDICINE

## 2025-06-06 PROCEDURE — 99214 OFFICE O/P EST MOD 30 MIN: CPT | Performed by: EMERGENCY MEDICINE

## 2025-06-06 PROCEDURE — G8427 DOCREV CUR MEDS BY ELIG CLIN: HCPCS | Performed by: EMERGENCY MEDICINE

## 2025-06-06 RX ORDER — CEPHALEXIN 500 MG/1
500 CAPSULE ORAL 2 TIMES DAILY
Qty: 20 CAPSULE | Refills: 0 | Status: SHIPPED | OUTPATIENT
Start: 2025-06-06 | End: 2025-06-16

## 2025-06-06 ASSESSMENT — ENCOUNTER SYMPTOMS
EYE DISCHARGE: 0
VOMITING: 0
SINUS PRESSURE: 0
EYE REDNESS: 0
SORE THROAT: 0
COUGH: 0
EYE PAIN: 0
SHORTNESS OF BREATH: 0
DIARRHEA: 0
WHEEZING: 0
NAUSEA: 0
ABDOMINAL DISTENTION: 0
BACK PAIN: 0

## 2025-06-06 NOTE — PROGRESS NOTES
Chief Complaint:   Leg Swelling (chronic)      History of Present Illness   HPI:  Tatiana Silveira is a 49 y.o. female who [facility] RLE redness, tenderness, hx of chronic lymphedema, currently does not have home health to monitor the legs or the current infection; her ADLs are affected secondary to inability to ambulate from the lymphedema severity.    PCP has made recent changes to her diuretic regimen to mitigate the swelling.    Prior to Visit Medications    Medication Sig Taking? Authorizing Provider   cephALEXin (KEFLEX) 500 MG capsule Take 1 capsule by mouth 2 times daily for 10 days Yes Missael Ambriz DO   vitamin D (ERGOCALCIFEROL) 1.25 MG (00776 UT) CAPS capsule Take 1 capsule by mouth once a week Yes Lynda Fischer APRN - NP   ondansetron (ZOFRAN) 4 MG tablet Take 1 tablet by mouth every 8 hours as needed for Nausea or Vomiting Yes Lynda Fischer APRN - NP   famotidine (PEPCID) 20 MG tablet Take 1 tablet by mouth 2 times daily as needed (heartburn) Yes Lynda Fischer APRN - NP   pantoprazole (PROTONIX) 40 MG tablet Take 1 tablet by mouth daily Yes Lynda Fischer APRN - NP   sucralfate (CARAFATE) 1 GM tablet Take 1 tablet by mouth 3 times daily Yes Lynda Fischer APRN - NP   traZODone (DESYREL) 50 MG tablet Take 1 tablet by mouth nightly as needed for Sleep Yes Lynda Fischer APRN - NP   gabapentin (NEURONTIN) 300 MG capsule Take 1 capsule by mouth at bedtime for 90 days. Yes Lynda Fischer APRN - NP   topiramate (TOPAMAX) 25 MG tablet Take 1 tablet by mouth nightly Yes Lynda Fischer APRN - NP   SUMAtriptan (IMITREX) 25 MG tablet Take 1 tablet by mouth once as needed for Migraine Yes Lynda Fischer APRN - NP   fluticasone (FLOVENT HFA) 44 MCG/ACT inhaler Inhale 2 puffs into the lungs 2 times daily Yes Lynda Fischer APRN - NP   furosemide (LASIX) 40 MG tablet Take 1 tablet by mouth daily Yes Lynda Fischer APRN - NP   hydroCHLOROthiazide (HYDRODIURIL) 25 MG tablet Take 1 tablet by mouth

## 2025-06-12 ENCOUNTER — TELEPHONE (OUTPATIENT)
Dept: PRIMARY CARE CLINIC | Age: 50
End: 2025-06-12

## 2025-06-12 NOTE — TELEPHONE ENCOUNTER
[facility] called and said that they need orders for the dressing for patients legs and wanted to know if Lynda Fischer can put the orders in and then  Fax number to fax orders [phone].

## 2025-06-19 ENCOUNTER — TELEPHONE (OUTPATIENT)
Dept: PRIMARY CARE CLINIC | Age: 50
End: 2025-06-19

## 2025-06-19 ENCOUNTER — OFFICE VISIT (OUTPATIENT)
Dept: PRIMARY CARE CLINIC | Age: 50
End: 2025-06-19
Payer: COMMERCIAL

## 2025-06-19 VITALS
HEART RATE: 67 BPM | OXYGEN SATURATION: 98 % | SYSTOLIC BLOOD PRESSURE: 136 MMHG | BODY MASS INDEX: 38.92 KG/M2 | HEIGHT: 68 IN | TEMPERATURE: 98.3 F | DIASTOLIC BLOOD PRESSURE: 86 MMHG

## 2025-06-19 DIAGNOSIS — Z91.89 AT HIGH RISK FOR SKIN BREAKDOWN: ICD-10-CM

## 2025-06-19 DIAGNOSIS — G80.9 CEREBRAL PALSY, UNSPECIFIED TYPE (HCC): ICD-10-CM

## 2025-06-19 DIAGNOSIS — N39.46 MIXED STRESS AND URGE URINARY INCONTINENCE: ICD-10-CM

## 2025-06-19 DIAGNOSIS — N39.46 MIXED STRESS AND URGE URINARY INCONTINENCE: Primary | ICD-10-CM

## 2025-06-19 DIAGNOSIS — B35.3 TINEA PEDIS OF BOTH FEET: ICD-10-CM

## 2025-06-19 DIAGNOSIS — I89.0 LYMPHEDEMA: Primary | ICD-10-CM

## 2025-06-19 PROCEDURE — G8427 DOCREV CUR MEDS BY ELIG CLIN: HCPCS | Performed by: NURSE PRACTITIONER

## 2025-06-19 PROCEDURE — 99214 OFFICE O/P EST MOD 30 MIN: CPT | Performed by: NURSE PRACTITIONER

## 2025-06-19 PROCEDURE — G2211 COMPLEX E/M VISIT ADD ON: HCPCS | Performed by: NURSE PRACTITIONER

## 2025-06-19 PROCEDURE — 1036F TOBACCO NON-USER: CPT | Performed by: NURSE PRACTITIONER

## 2025-06-19 PROCEDURE — G8417 CALC BMI ABV UP PARAM F/U: HCPCS | Performed by: NURSE PRACTITIONER

## 2025-06-19 RX ORDER — MICONAZOLE NITRATE 2 G/100G
CREAM TOPICAL
Qty: 1 EACH | Refills: 1 | Status: SHIPPED | OUTPATIENT
Start: 2025-06-19

## 2025-06-19 RX ORDER — HYDROPHOR 42 G/100G
OINTMENT TOPICAL
Qty: 454 G | Refills: 2 | Status: SHIPPED | OUTPATIENT
Start: 2025-06-19

## 2025-06-19 RX ORDER — ACETAMINOPHEN 500 MG
1 TABLET ORAL DAILY
Qty: 30 CAPSULE | Refills: 0 | Status: SHIPPED | OUTPATIENT
Start: 2025-06-19

## 2025-06-19 ASSESSMENT — ENCOUNTER SYMPTOMS
DIARRHEA: 0
COUGH: 0
ABDOMINAL DISTENTION: 0
FACIAL SWELLING: 0
BACK PAIN: 0
NAUSEA: 0
EYES NEGATIVE: 1
CONSTIPATION: 0
ABDOMINAL PAIN: 0
APNEA: 0
RHINORRHEA: 0
CHEST TIGHTNESS: 0
WHEEZING: 0
VOICE CHANGE: 0
COLOR CHANGE: 0
VOMITING: 0
SHORTNESS OF BREATH: 0

## 2025-06-19 NOTE — PROGRESS NOTES
ESTABLISHED PRIMARY CARE VISIT  25  Name: Tatiana Silveira   : 1975   Age: 49 y.o.  Sex: female   Chief Complaint   Patient presents with    Follow-up     HPI:     History of Present Illness  The patient is a 49-year-old female presenting with lymphedema.    She reports intermittent leg issues, managed with home nurse wraps due to redness and warmth. Intermittent leg drainage persists. Keflex was prescribed on 2025 for 10 days due to severe diarrhea from the previous antibiotic. Diarrhea continues with Keflex; no OTC probiotics taken. History of C. difficile infection. Sucralfate provides some relief by reducing stool water content. [employer] boot supplies depleted; nurse visits  and  for leg wraps. Severe leg pain managed with Tylenol; gabapentin used sparingly due to fatigue and numbness. Avoids ibuprofen due to liver discomfort.    Experiencing buttock sores from incontinence, uses diaper cream for relief. Seeks portable catheter to manage incontinence and prevent yeast infections. Uses PureWick during the day, accidents at night due to overflow. Considering second opinion from urologist. Suspects bladder weakening and incomplete emptying, causing leakage. Arranging transportation for medical appointments due to difficulty applying pressure while driving.  Patient is requesting evaluation for suprapubic catheter as recommended by her previous PCP, she did see Dr. Escobar who felt she was not a good candidate.  Subjective:   Review of Systems   Constitutional:  Negative for activity change, appetite change, fatigue, fever and unexpected weight change.   HENT:  Negative for congestion, facial swelling, mouth sores, postnasal drip, rhinorrhea, sneezing, tinnitus and voice change.    Eyes: Negative.    Respiratory:  Negative for apnea, cough, chest tightness, shortness of breath and wheezing.    Cardiovascular:  Negative for chest pain, palpitations and leg swelling.   Gastrointestinal:

## 2025-06-19 NOTE — ASSESSMENT & PLAN NOTE
Encourage patient to take antibiotic in its entirety.  Probiotic daily.  Elevate lower extremities is much as possible.  Patient is to call and schedule a follow-up with podiatry, continue wraps.  Encouraged lymphedema clinic, patient expressed desire but is concerned about transportation issues.    Orders:    Probiotic, Lactobacillus, CAPS; Take 1 capsule by mouth daily

## 2025-06-25 ENCOUNTER — TELEPHONE (OUTPATIENT)
Dept: PRIMARY CARE CLINIC | Age: 50
End: 2025-06-25

## 2025-06-25 NOTE — TELEPHONE ENCOUNTER
A rep from [facility] called and said she found a place that purewick can be ordered through and they can bill the insurance. She said it would probably be best if you order the gloves and underpads through them also to keep consistency. The name of the company is [employer] phone number [phone]. She said you can find them online at [employer]. She said if you have any questions, you can call her back at [phone]

## 2025-06-26 NOTE — TELEPHONE ENCOUNTER
Pt called in asking to speak with her nurse.  I explained that her nurse was not here.  Pt stated that she did not get dismissed from [facility]. She stated they dismissed order for the purwick because they do not care them.  Pt stated that they are looking into some other external catheter for her and will send that information when the get it.  Pt also stated theat they need the Approval for the reusable chucks.  They have them ready to ship but have to wait for approval.

## 2025-06-27 NOTE — TELEPHONE ENCOUNTER
Last chart note printed and will be faxed to the company of choice.      Fax number is [phone]

## 2025-07-22 ENCOUNTER — TELEPHONE (OUTPATIENT)
Dept: PRIMARY CARE CLINIC | Age: 50
End: 2025-07-22

## 2025-07-22 NOTE — TELEPHONE ENCOUNTER
Spoke with Irina again. Gave permission for them to order the wound supplies. I asked about referral to wound care and Irina said she can speak with patient to see if she would be willing, but she knows patient has issues with trying to get places. When she sees Lynda, she uses her motorized wheelchair and drives to the office bc it is hard to get her in a car. Irina also mentioned that they do have a home nurse that is certified in wound care and she can check to see if she can at least evaluate her. I advised if they would need an order for that, to let us know.

## 2025-07-22 NOTE — TELEPHONE ENCOUNTER
Irina, from [facility], called and said that she has been seeing patient and has been wrapping her feet and legs. She said patient complains that they get too hot and it's uncomfortable. Irina said her feet and legs are dry and there are open and seeping area on them. She said she does try to keep them up, but is not able too all the time. She just wanted to see if you had any other ideas on what can be done for her. I asked if patient sees podiatry and she said she does but can not get in to see him until 09/22. Irina said she does have some areas on the feet and legs that are red and cellulitis looking and very sensitive.   She also said that patient needs wound care supplies ordered. She asked if we would want them to order the supplies since they are caring for her?

## 2025-08-11 ENCOUNTER — OFFICE VISIT (OUTPATIENT)
Dept: PRIMARY CARE CLINIC | Age: 50
End: 2025-08-11
Payer: COMMERCIAL

## 2025-08-11 VITALS
SYSTOLIC BLOOD PRESSURE: 136 MMHG | RESPIRATION RATE: 16 BRPM | OXYGEN SATURATION: 97 % | BODY MASS INDEX: 37.8 KG/M2 | HEART RATE: 81 BPM | HEIGHT: 69 IN | TEMPERATURE: 99.1 F | DIASTOLIC BLOOD PRESSURE: 82 MMHG

## 2025-08-11 DIAGNOSIS — F32.A DEPRESSIVE DISORDER: ICD-10-CM

## 2025-08-11 DIAGNOSIS — L89.90 PRESSURE ULCER CAUSED BY DEVICE: ICD-10-CM

## 2025-08-11 DIAGNOSIS — R51.9 CHRONIC NONINTRACTABLE HEADACHE, UNSPECIFIED HEADACHE TYPE: ICD-10-CM

## 2025-08-11 DIAGNOSIS — E55.9 VITAMIN D DEFICIENCY: ICD-10-CM

## 2025-08-11 DIAGNOSIS — T85.898A PRESSURE ULCER CAUSED BY DEVICE: ICD-10-CM

## 2025-08-11 DIAGNOSIS — I89.0 LYMPHEDEMA: ICD-10-CM

## 2025-08-11 DIAGNOSIS — G47.33 OSA (OBSTRUCTIVE SLEEP APNEA): ICD-10-CM

## 2025-08-11 DIAGNOSIS — G62.9 NEUROPATHY: ICD-10-CM

## 2025-08-11 DIAGNOSIS — J45.30 MILD PERSISTENT ASTHMA WITHOUT COMPLICATION: ICD-10-CM

## 2025-08-11 DIAGNOSIS — K58.0 IRRITABLE BOWEL SYNDROME WITH DIARRHEA: ICD-10-CM

## 2025-08-11 DIAGNOSIS — R60.0 PERIPHERAL EDEMA: ICD-10-CM

## 2025-08-11 DIAGNOSIS — K21.9 GASTROESOPHAGEAL REFLUX DISEASE WITHOUT ESOPHAGITIS: ICD-10-CM

## 2025-08-11 DIAGNOSIS — G80.9 CEREBRAL PALSY, UNSPECIFIED TYPE (HCC): Primary | ICD-10-CM

## 2025-08-11 DIAGNOSIS — N92.1 MENORRHAGIA WITH IRREGULAR CYCLE: ICD-10-CM

## 2025-08-11 DIAGNOSIS — G89.29 CHRONIC NONINTRACTABLE HEADACHE, UNSPECIFIED HEADACHE TYPE: ICD-10-CM

## 2025-08-11 DIAGNOSIS — F41.9 ANXIETY: ICD-10-CM

## 2025-08-11 DIAGNOSIS — F51.01 PRIMARY INSOMNIA: ICD-10-CM

## 2025-08-11 LAB
ALBUMIN: 4.1 G/DL (ref 3.5–5.2)
ALP BLD-CCNC: 82 U/L (ref 35–104)
ALT SERPL-CCNC: 11 U/L (ref 0–35)
ANION GAP SERPL CALCULATED.3IONS-SCNC: 11 MMOL/L (ref 7–16)
AST SERPL-CCNC: 22 U/L (ref 0–35)
BASOPHILS ABSOLUTE: 0.02 K/UL (ref 0–0.2)
BASOPHILS RELATIVE PERCENT: 0 % (ref 0–2)
BILIRUB SERPL-MCNC: 0.3 MG/DL (ref 0–1.2)
BUN BLDV-MCNC: 14 MG/DL (ref 6–20)
CALCIUM SERPL-MCNC: 9.5 MG/DL (ref 8.6–10)
CHLORIDE BLD-SCNC: 103 MMOL/L (ref 98–107)
CO2: 25 MMOL/L (ref 22–29)
CREAT SERPL-MCNC: 0.6 MG/DL (ref 0.5–1)
EOSINOPHILS ABSOLUTE: 0.23 K/UL (ref 0.05–0.5)
EOSINOPHILS RELATIVE PERCENT: 4 % (ref 0–6)
FERRITIN: 20 NG/ML
FOLLICLE STIMULATING HORMONE: 66.6 MIU/ML
GFR, ESTIMATED: >90 ML/MIN/1.73M2
GLUCOSE BLD-MCNC: 93 MG/DL (ref 74–99)
HCT VFR BLD CALC: 36.6 % (ref 34–48)
HEMOGLOBIN: 11.1 G/DL (ref 11.5–15.5)
IMMATURE GRANULOCYTES %: 0 % (ref 0–5)
IMMATURE GRANULOCYTES ABSOLUTE: <0.03 K/UL (ref 0–0.58)
IRON % SATURATION: 10 % (ref 15–50)
IRON: 35 UG/DL (ref 37–145)
LYMPHOCYTES ABSOLUTE: 1.28 K/UL (ref 1.5–4)
LYMPHOCYTES RELATIVE PERCENT: 22 % (ref 20–42)
MCH RBC QN AUTO: 25.3 PG (ref 26–35)
MCHC RBC AUTO-ENTMCNC: 30.3 G/DL (ref 32–34.5)
MCV RBC AUTO: 83.6 FL (ref 80–99.9)
MONOCYTES ABSOLUTE: 0.36 K/UL (ref 0.1–0.95)
MONOCYTES RELATIVE PERCENT: 6 % (ref 2–12)
NEUTROPHILS ABSOLUTE: 4.03 K/UL (ref 1.8–7.3)
NEUTROPHILS RELATIVE PERCENT: 68 % (ref 43–80)
PDW BLD-RTO: 15.1 % (ref 11.5–15)
PLATELET # BLD: 379 K/UL (ref 130–450)
PMV BLD AUTO: 9.5 FL (ref 7–12)
POTASSIUM SERPL-SCNC: 3.7 MMOL/L (ref 3.5–5.1)
RBC # BLD: 4.38 M/UL (ref 3.5–5.5)
SODIUM BLD-SCNC: 139 MMOL/L (ref 136–145)
TOTAL IRON BINDING CAPACITY: 356 UG/DL (ref 250–450)
TOTAL PROTEIN: 7.5 G/DL (ref 6.4–8.3)
WBC # BLD: 5.9 K/UL (ref 4.5–11.5)

## 2025-08-11 PROCEDURE — 99214 OFFICE O/P EST MOD 30 MIN: CPT | Performed by: NURSE PRACTITIONER

## 2025-08-11 PROCEDURE — G8417 CALC BMI ABV UP PARAM F/U: HCPCS | Performed by: NURSE PRACTITIONER

## 2025-08-11 PROCEDURE — 1036F TOBACCO NON-USER: CPT | Performed by: NURSE PRACTITIONER

## 2025-08-11 PROCEDURE — G8427 DOCREV CUR MEDS BY ELIG CLIN: HCPCS | Performed by: NURSE PRACTITIONER

## 2025-08-11 RX ORDER — LISINOPRIL 20 MG/1
20 TABLET ORAL DAILY
Qty: 90 TABLET | Refills: 1 | Status: SHIPPED | OUTPATIENT
Start: 2025-08-11

## 2025-08-11 RX ORDER — TOPIRAMATE 25 MG/1
25 TABLET, FILM COATED ORAL NIGHTLY
Qty: 90 TABLET | Refills: 1 | Status: SHIPPED | OUTPATIENT
Start: 2025-08-11 | End: 2025-08-11 | Stop reason: SDUPTHER

## 2025-08-11 RX ORDER — SUCRALFATE 1 G/1
1 TABLET ORAL 3 TIMES DAILY
Qty: 120 TABLET | Refills: 2 | Status: SHIPPED | OUTPATIENT
Start: 2025-08-11

## 2025-08-11 RX ORDER — FLUTICASONE PROPIONATE 44 UG/1
2 AEROSOL, METERED RESPIRATORY (INHALATION) 2 TIMES DAILY
Qty: 10.6 G | Refills: 3 | Status: SHIPPED | OUTPATIENT
Start: 2025-08-11

## 2025-08-11 RX ORDER — FAMOTIDINE 20 MG/1
20 TABLET, FILM COATED ORAL 2 TIMES DAILY PRN
Qty: 60 TABLET | Refills: 0 | Status: SHIPPED | OUTPATIENT
Start: 2025-08-11

## 2025-08-11 RX ORDER — TOPIRAMATE 50 MG/1
50 TABLET, FILM COATED ORAL NIGHTLY
Qty: 90 TABLET | Refills: 1 | Status: SHIPPED | OUTPATIENT
Start: 2025-08-11

## 2025-08-11 RX ORDER — MICONAZOLE NITRATE 2 G/100G
CREAM TOPICAL
Qty: 1 EACH | Refills: 1 | Status: SHIPPED | OUTPATIENT
Start: 2025-08-11

## 2025-08-11 RX ORDER — ERGOCALCIFEROL 1.25 MG/1
50000 CAPSULE, LIQUID FILLED ORAL WEEKLY
Qty: 12 CAPSULE | Refills: 3 | Status: SHIPPED | OUTPATIENT
Start: 2025-08-11

## 2025-08-11 RX ORDER — ERGOCALCIFEROL 1.25 MG/1
50000 CAPSULE, LIQUID FILLED ORAL WEEKLY
Qty: 4 CAPSULE | Refills: 3 | Status: SHIPPED | OUTPATIENT
Start: 2025-08-11 | End: 2025-08-11

## 2025-08-11 RX ORDER — SUMATRIPTAN SUCCINATE 25 MG/1
25 TABLET ORAL
Qty: 9 TABLET | Refills: 1 | Status: SHIPPED | OUTPATIENT
Start: 2025-08-11 | End: 2025-08-11 | Stop reason: SINTOL

## 2025-08-11 RX ORDER — POTASSIUM CHLORIDE 1500 MG/1
20 TABLET, EXTENDED RELEASE ORAL DAILY
Qty: 90 TABLET | Refills: 1 | Status: SHIPPED | OUTPATIENT
Start: 2025-08-11

## 2025-08-11 RX ORDER — GABAPENTIN 300 MG/1
300 CAPSULE ORAL NIGHTLY
Qty: 30 CAPSULE | Refills: 2 | Status: SHIPPED | OUTPATIENT
Start: 2025-08-11 | End: 2025-11-09

## 2025-08-11 RX ORDER — PANTOPRAZOLE SODIUM 40 MG/1
40 TABLET, DELAYED RELEASE ORAL DAILY
Qty: 90 TABLET | Refills: 1 | Status: SHIPPED | OUTPATIENT
Start: 2025-08-11

## 2025-08-11 RX ORDER — L. ACIDOPHILUS/L.BULGARICUS 1MM CELL
1 TABLET ORAL DAILY
COMMUNITY
Start: 2025-06-19 | End: 2025-08-11

## 2025-08-11 RX ORDER — FUROSEMIDE 40 MG/1
40 TABLET ORAL DAILY
Qty: 90 TABLET | Refills: 1 | Status: SHIPPED | OUTPATIENT
Start: 2025-08-11

## 2025-08-11 RX ORDER — HYDROCHLOROTHIAZIDE 25 MG/1
25 TABLET ORAL EVERY MORNING
Qty: 90 TABLET | Refills: 1 | Status: SHIPPED | OUTPATIENT
Start: 2025-08-11

## 2025-08-11 RX ORDER — HYDROPHOR 42 G/100G
OINTMENT TOPICAL
Qty: 454 G | Refills: 2 | Status: SHIPPED | OUTPATIENT
Start: 2025-08-11

## 2025-08-11 RX ORDER — DESVENLAFAXINE 25 MG/1
25 TABLET, EXTENDED RELEASE ORAL DAILY
COMMUNITY
Start: 2025-07-29

## 2025-08-11 RX ORDER — ACETAMINOPHEN 500 MG
1 TABLET ORAL DAILY
Qty: 90 CAPSULE | Refills: 1 | Status: SHIPPED | OUTPATIENT
Start: 2025-08-11

## 2025-08-11 ASSESSMENT — ENCOUNTER SYMPTOMS
EYES NEGATIVE: 1
DIARRHEA: 0
COLOR CHANGE: 0
NAUSEA: 0
COUGH: 0
CHEST TIGHTNESS: 0
WHEEZING: 1
VOMITING: 0
ABDOMINAL DISTENTION: 0
ABDOMINAL PAIN: 0
RHINORRHEA: 0
FACIAL SWELLING: 0
BACK PAIN: 0
VOICE CHANGE: 0
APNEA: 0
SHORTNESS OF BREATH: 1
CONSTIPATION: 0

## 2025-08-12 ENCOUNTER — TELEPHONE (OUTPATIENT)
Dept: PRIMARY CARE CLINIC | Age: 50
End: 2025-08-12

## 2025-08-12 DIAGNOSIS — Z12.11 SCREENING FOR COLON CANCER: ICD-10-CM

## 2025-08-12 DIAGNOSIS — D50.8 IRON DEFICIENCY ANEMIA SECONDARY TO INADEQUATE DIETARY IRON INTAKE: Primary | ICD-10-CM

## 2025-08-12 DIAGNOSIS — K21.9 GASTROESOPHAGEAL REFLUX DISEASE WITHOUT ESOPHAGITIS: ICD-10-CM

## 2025-08-18 ENCOUNTER — TELEPHONE (OUTPATIENT)
Dept: PRIMARY CARE CLINIC | Age: 50
End: 2025-08-18

## 2025-08-25 ENCOUNTER — TELEPHONE (OUTPATIENT)
Dept: PRIMARY CARE CLINIC | Age: 50
End: 2025-08-25

## 2025-08-25 ENCOUNTER — OFFICE VISIT (OUTPATIENT)
Dept: FAMILY MEDICINE CLINIC | Age: 50
End: 2025-08-25
Payer: COMMERCIAL

## 2025-08-25 VITALS
HEART RATE: 82 BPM | SYSTOLIC BLOOD PRESSURE: 136 MMHG | RESPIRATION RATE: 18 BRPM | TEMPERATURE: 97.7 F | DIASTOLIC BLOOD PRESSURE: 74 MMHG | OXYGEN SATURATION: 97 % | HEIGHT: 68 IN | BODY MASS INDEX: 38.92 KG/M2

## 2025-08-25 DIAGNOSIS — D21.9 FIBROID: ICD-10-CM

## 2025-08-25 DIAGNOSIS — B37.2 YEAST INFECTION OF THE SKIN: Primary | ICD-10-CM

## 2025-08-25 PROCEDURE — 1036F TOBACCO NON-USER: CPT | Performed by: NURSE PRACTITIONER

## 2025-08-25 PROCEDURE — G8427 DOCREV CUR MEDS BY ELIG CLIN: HCPCS | Performed by: NURSE PRACTITIONER

## 2025-08-25 PROCEDURE — G8417 CALC BMI ABV UP PARAM F/U: HCPCS | Performed by: NURSE PRACTITIONER

## 2025-08-25 PROCEDURE — 99214 OFFICE O/P EST MOD 30 MIN: CPT | Performed by: NURSE PRACTITIONER

## 2025-08-25 RX ORDER — ZINC OXIDE 20 G/100G
OINTMENT TOPICAL
Qty: 227 G | Refills: 0 | Status: SHIPPED | OUTPATIENT
Start: 2025-08-25

## 2025-08-25 RX ORDER — NYSTATIN 100000 U/G
CREAM TOPICAL
Qty: 90 G | Refills: 2 | Status: SHIPPED | OUTPATIENT
Start: 2025-08-25